# Patient Record
Sex: FEMALE | Race: WHITE | NOT HISPANIC OR LATINO | Employment: FULL TIME | URBAN - NONMETROPOLITAN AREA
[De-identification: names, ages, dates, MRNs, and addresses within clinical notes are randomized per-mention and may not be internally consistent; named-entity substitution may affect disease eponyms.]

---

## 2018-02-05 ENCOUNTER — TELEPHONE (OUTPATIENT)
Dept: FAMILY MEDICINE CLINIC | Facility: CLINIC | Age: 58
End: 2018-02-05

## 2018-02-05 NOTE — TELEPHONE ENCOUNTER
PHONE CALL FROM PATIENT STATING THAT LEFT HAND CONTINUES TO HAVE INCREASED PAIN AND SWELLING AND LEFT RING FINGER SEEMS TO TWIST AND LOCK  PATIENT ALSO NOTICED RECENT RIGHT HAND TENDERNESS AND SWELLING AND POSSIBLE LUMP  PATIENT IS REQUESTING NEW RHEUMATOLOGY REFERRAL AND/OR SOONER APPOINTMENT IN OFFICE

## 2018-02-06 ENCOUNTER — TRANSCRIBE ORDERS (OUTPATIENT)
Dept: FAMILY MEDICINE CLINIC | Facility: CLINIC | Age: 58
End: 2018-02-06

## 2018-02-06 DIAGNOSIS — M79.642 BILATERAL HAND PAIN: Primary | ICD-10-CM

## 2018-02-06 DIAGNOSIS — M25.449 SWELLING OF HAND JOINT, UNSPECIFIED LATERALITY: ICD-10-CM

## 2018-02-06 DIAGNOSIS — M79.641 BILATERAL HAND PAIN: Primary | ICD-10-CM

## 2018-02-06 NOTE — TELEPHONE ENCOUNTER
Please refer patient to Rheumatology for evaluation and treatment of B/L Hand Pain and Swelling  Schedule patient for appointment with our office

## 2018-02-19 ENCOUNTER — TELEPHONE (OUTPATIENT)
Dept: FAMILY MEDICINE CLINIC | Facility: CLINIC | Age: 58
End: 2018-02-19

## 2018-04-05 LAB — HBA1C MFR BLD HPLC: 5.9 %

## 2018-04-17 RX ORDER — LISINOPRIL 5 MG/1
1 TABLET ORAL DAILY
COMMUNITY
Start: 2018-03-02 | End: 2018-04-18 | Stop reason: SDUPTHER

## 2018-04-17 RX ORDER — LEVOTHYROXINE SODIUM 0.07 MG/1
1 TABLET ORAL DAILY
COMMUNITY
Start: 2018-03-02 | End: 2018-04-18 | Stop reason: SDUPTHER

## 2018-04-17 RX ORDER — METOPROLOL SUCCINATE 50 MG/1
1 TABLET, EXTENDED RELEASE ORAL DAILY
COMMUNITY
Start: 2018-03-14 | End: 2018-04-18 | Stop reason: SDUPTHER

## 2018-04-18 ENCOUNTER — OFFICE VISIT (OUTPATIENT)
Dept: FAMILY MEDICINE CLINIC | Facility: CLINIC | Age: 58
End: 2018-04-18
Payer: COMMERCIAL

## 2018-04-18 VITALS
RESPIRATION RATE: 18 BRPM | TEMPERATURE: 98.2 F | HEART RATE: 64 BPM | SYSTOLIC BLOOD PRESSURE: 110 MMHG | OXYGEN SATURATION: 98 % | BODY MASS INDEX: 36.57 KG/M2 | HEIGHT: 63 IN | WEIGHT: 206.4 LBS | DIASTOLIC BLOOD PRESSURE: 64 MMHG

## 2018-04-18 DIAGNOSIS — M25.50 POLYARTHRALGIA: ICD-10-CM

## 2018-04-18 DIAGNOSIS — E55.9 VITAMIN D DEFICIENCY: ICD-10-CM

## 2018-04-18 DIAGNOSIS — R73.9 HYPERGLYCEMIA: ICD-10-CM

## 2018-04-18 DIAGNOSIS — E78.2 MIXED HYPERLIPIDEMIA: ICD-10-CM

## 2018-04-18 DIAGNOSIS — M25.449 SWELLING OF FINGER JOINT, UNSPECIFIED LATERALITY: ICD-10-CM

## 2018-04-18 DIAGNOSIS — J30.89 SEASONAL ALLERGIC RHINITIS DUE TO OTHER ALLERGIC TRIGGER: ICD-10-CM

## 2018-04-18 DIAGNOSIS — M79.641 PAIN IN BOTH HANDS: ICD-10-CM

## 2018-04-18 DIAGNOSIS — D50.8 OTHER IRON DEFICIENCY ANEMIA: ICD-10-CM

## 2018-04-18 DIAGNOSIS — E03.9 ACQUIRED HYPOTHYROIDISM: ICD-10-CM

## 2018-04-18 DIAGNOSIS — I10 ESSENTIAL HYPERTENSION: Primary | ICD-10-CM

## 2018-04-18 DIAGNOSIS — M79.642 PAIN IN BOTH HANDS: ICD-10-CM

## 2018-04-18 DIAGNOSIS — E53.8 VITAMIN B 12 DEFICIENCY: ICD-10-CM

## 2018-04-18 DIAGNOSIS — E88.81 INSULIN RESISTANCE: ICD-10-CM

## 2018-04-18 DIAGNOSIS — R79.89 ELEVATED TSH: ICD-10-CM

## 2018-04-18 DIAGNOSIS — M35.3 POLYMYALGIA (HCC): ICD-10-CM

## 2018-04-18 DIAGNOSIS — Z13.29 SCREENING FOR HYPOTHYROIDISM: ICD-10-CM

## 2018-04-18 PROBLEM — J30.9 ALLERGIC RHINITIS DUE TO ALLERGEN: Status: ACTIVE | Noted: 2018-04-18

## 2018-04-18 PROBLEM — E88.819 INSULIN RESISTANCE: Status: ACTIVE | Noted: 2018-04-18

## 2018-04-18 PROCEDURE — 3008F BODY MASS INDEX DOCD: CPT | Performed by: NURSE PRACTITIONER

## 2018-04-18 PROCEDURE — 99203 OFFICE O/P NEW LOW 30 MIN: CPT | Performed by: NURSE PRACTITIONER

## 2018-04-18 RX ORDER — CHOLECALCIFEROL (VITAMIN D3) 1250 MCG
1 CAPSULE ORAL WEEKLY
COMMUNITY
End: 2018-04-18 | Stop reason: SDUPTHER

## 2018-04-18 RX ORDER — FEXOFENADINE HYDROCHLORIDE 60 MG/1
60 TABLET, FILM COATED ORAL DAILY PRN
Qty: 90 TABLET | Refills: 1 | Status: SHIPPED | OUTPATIENT
Start: 2018-04-18 | End: 2018-10-18 | Stop reason: SDUPTHER

## 2018-04-18 RX ORDER — METOPROLOL SUCCINATE 50 MG/1
50 TABLET, EXTENDED RELEASE ORAL DAILY
Qty: 90 TABLET | Refills: 1 | Status: SHIPPED | OUTPATIENT
Start: 2018-04-18 | End: 2018-10-18 | Stop reason: SDUPTHER

## 2018-04-18 RX ORDER — CHOLECALCIFEROL (VITAMIN D3) 1250 MCG
1 CAPSULE ORAL WEEKLY
Qty: 12 CAPSULE | Refills: 1 | Status: SHIPPED | OUTPATIENT
Start: 2018-04-18 | End: 2018-10-18 | Stop reason: SDUPTHER

## 2018-04-18 RX ORDER — IBUPROFEN 800 MG/1
800 TABLET ORAL DAILY PRN
Qty: 90 TABLET | Refills: 1 | Status: SHIPPED | OUTPATIENT
Start: 2018-04-18 | End: 2018-10-18 | Stop reason: ALTCHOICE

## 2018-04-18 RX ORDER — FEXOFENADINE HYDROCHLORIDE 60 MG/1
60 TABLET, FILM COATED ORAL DAILY PRN
COMMUNITY
End: 2018-04-18 | Stop reason: SDUPTHER

## 2018-04-18 RX ORDER — IBUPROFEN 800 MG/1
800 TABLET ORAL DAILY PRN
COMMUNITY
End: 2018-04-18 | Stop reason: SDUPTHER

## 2018-04-18 RX ORDER — MULTIVIT WITH MINERALS/LUTEIN
1000 TABLET ORAL DAILY
COMMUNITY
End: 2019-10-31 | Stop reason: SDUPTHER

## 2018-04-18 RX ORDER — LISINOPRIL 5 MG/1
5 TABLET ORAL DAILY
Qty: 90 TABLET | Refills: 1 | Status: SHIPPED | OUTPATIENT
Start: 2018-04-18 | End: 2018-10-18 | Stop reason: SDUPTHER

## 2018-04-18 RX ORDER — THIAMINE HCL 100 MG
1 TABLET ORAL EVERY OTHER DAY
COMMUNITY
End: 2019-10-31 | Stop reason: SDUPTHER

## 2018-04-18 RX ORDER — LEVOTHYROXINE SODIUM 0.07 MG/1
75 TABLET ORAL DAILY
Qty: 90 TABLET | Refills: 1 | Status: SHIPPED | OUTPATIENT
Start: 2018-04-18 | End: 2018-10-18 | Stop reason: SDUPTHER

## 2018-04-18 NOTE — PATIENT INSTRUCTIONS
Metabolic Syndrome X   WHAT YOU NEED TO KNOW:   What is metabolic syndrome? Metabolic syndrome is a group of medical conditions that can increase your risk for heart disease, stroke, or type 2 diabetes  You may have metabolic syndrome if you have at least 3 of the following medical conditions:  · High triglycerides (a type of fat in your blood)    · Low HDL cholesterol (good cholesterol)    · High blood pressure    · High blood sugar levels    · Extra abdominal fat  What increases my risk for metabolic syndrome? The exact cause of metabolic syndrome is not known  Your risk for metabolic syndrome increases if you have insulin resistance  Insulin is a hormone that helps your body take sugar out of your blood and use it for energy  Insulin resistance means your pancreas keeps making insulin but your body cannot use it correctly  Your risk for metabolic syndrome also increases as you age, if you are overweight or obese, or you do not exercise  What are the signs and symptoms of metabolic syndrome? Most people with metabolic syndrome do not have any signs or symptoms  You may have more thirst or hunger than usual, urinate more often, or have blurred vision or headaches  How is metabolic syndrome diagnosed? Your healthcare provider will examine you and ask about other medical conditions you may have  He may ask if you have any family members with metabolic syndrome, diabetes, obesity, or heart disease  · Blood tests: These are used to check your glucose and cholesterol levels  · Oral glucose tolerance test:  Your blood sugar level is tested after you fast for 8 hours, then again after you are given a glucose drink  The test measures how high your blood sugar level rises from the glucose drink  How is metabolic syndrome treated? · Cholesterol medicine: This type of medicine is given to help decrease (lower) the amount of cholesterol (fat) in your blood   Cholesterol medicine works best if you also exercise and eat a healthy diet that is low in certain kinds of fats  Some cholesterol medicines may cause liver problems  You may need to have blood taken for tests while using this medicine  · Blood pressure medicine: This is given to lower your blood pressure  A controlled blood pressure helps protect your organs, such as your heart, lungs, brain, and kidneys  Take your blood pressure medicine exactly as directed  · Hypoglycemic medicine: This medicine may be given to decrease the amount of sugar in your blood  Hypoglycemic medicine helps your body move the sugar to your cells, where it is needed for energy  What are the risks of metabolic syndrome? If untreated, metabolic syndrome increases your risk of heart disease, stroke, and diabetes  These conditions lead to life-threatening illness  How can I manage metabolic syndrome? · Maintain a healthy weight:  Weight loss helps lower cholesterol, triglycerides, blood pressure, and blood glucose levels  It can also raise HDL (good cholesterol)  Ask your healthcare provider how much you should weigh  Ask him to help you create a weight loss plan if you are overweight  · Eat a variety of healthy foods:  Healthy foods include fruits, vegetables, whole-grain breads, low-fat dairy products, beans, lean meats, and fish  Eat foods that are low in fat and sodium (salt)  A dietitian can help you plan healthy meals  · Exercise:  Ask your healthcare provider to help you create an exercise plan  Exercise can help lower your blood pressure, cholesterol, and blood sugar levels  Exercise can also help raise your HDL level and help you to lose weight  Get at least 30 minutes of exercise, 5 days each week  · Check your blood pressure as directed: You may be asked to keep a record of your blood pressure and bring it with you to follow-up visits  Ask your healthcare provider what your blood pressure should be and how to check it       · Limit alcohol:  Women should limit alcohol to 1 drink a day  Men should limit alcohol to 2 drinks a day  A drink of alcohol is 12 ounces of beer, 5 ounces of wine, or 1½ ounces of liquor  · Do not smoke: If you smoke, it is never too late to quit  Smoking further increases your risk for heart disease and stroke  Ask your healthcare provider for information if you need help quitting  When should I contact my healthcare provider? · You have more thirst or hunger than usual      · You urinate more frequently  · You have blurred vision  · You have questions or concerns about your condition or care  When should I seek immediate care or call 911? · Your blood pressure is higher than your healthcare provider told you it should be  · You have chest pain or discomfort that spreads to your arms, jaw, or back  · You have a severe headache or dizziness  · You have trouble thinking, speaking, or understanding others  CARE AGREEMENT:   You have the right to help plan your care  Learn about your health condition and how it may be treated  Discuss treatment options with your caregivers to decide what care you want to receive  You always have the right to refuse treatment  The above information is an  only  It is not intended as medical advice for individual conditions or treatments  Talk to your doctor, nurse or pharmacist before following any medical regimen to see if it is safe and effective for you  © 2017 [employer] Information is for End User's use only and may not be sold, redistributed or otherwise used for commercial purposes  All illustrations and images included in CareNotes® are the copyrighted property of [employer]  or Baldev Newman  Instructed patient to take OTC Benefiber as directed  Wellness Visit for Adults   AMBULATORY CARE:   A wellness visit  is when you see your healthcare provider to get screened for health problems   You can also get advice on how to stay healthy  Write down your questions so you remember to ask them  Ask your healthcare provider how often you should have a wellness visit  What happens at a wellness visit:  Your healthcare provider will ask about your health, and your family history of health problems  This includes high blood pressure, heart disease, and cancer  He or she will ask if you have symptoms that concern you, if you smoke, and about your mood  You may also be asked about your intake of medicines, supplements, food, and alcohol  Any of the following may be done:  · Your weight  will be checked  Your height may also be checked so your body mass index (BMI) can be calculated  Your BMI shows if you are at a healthy weight  · Your blood pressure  and heart rate will be checked  Your temperature may also be checked  · Blood and urine tests  may be done  Blood tests may be done to check your cholesterol levels  Abnormal cholesterol levels increase your risk for heart disease and stroke  You may also need a blood or urine test to check for diabetes if you are at increased risk  Urine tests may be done to look for signs of an infection or kidney disease  · A physical exam  includes checking your heartbeat and lungs with a stethoscope  Your healthcare provider may also check your skin to look for sun damage  · Screening tests  may be recommended  A screening test is done to check for diseases that may not cause symptoms  The screening tests you may need depend on your age, gender, family history, and lifestyle habits  For example, colorectal screening may be recommended if you are 48years old or older  Screening tests you need if you are a woman:   · A Pap smear  is used to screen for cervical cancer  Pap smears are usually done every 3 to 5 years depending on your age  You may need them more often if you have had abnormal Pap smear test results in the past  Ask your healthcare provider how often you should have a Pap smear  · A mammogram  is an x-ray of your breasts to screen for breast cancer  Experts recommend mammograms every 2 years starting at age 48 years  You may need a mammogram at age 52 years or younger if you have an increased risk for breast cancer  Talk to your healthcare provider about when you should start having mammograms and how often you need them  Vaccines you may need:   · Get an influenza vaccine  every year  The influenza vaccine protects you from the flu  Several types of viruses cause the flu  The viruses change over time, so new vaccines are made each year  · Get a tetanus-diphtheria (Td) booster vaccine  every 10 years  This vaccine protects you against tetanus and diphtheria  Tetanus is a severe infection that may cause painful muscle spasms and lockjaw  Diphtheria is a severe bacterial infection that causes a thick covering in the back of your mouth and throat  · Get a human papillomavirus (HPV) vaccine  if you are female and aged 23 to 32 or male 23 to 24 and never received it  This vaccine protects you from HPV infection  HPV is the most common infection spread by sexual contact  HPV may also cause vaginal, penile, and anal cancers  · Get a pneumococcal vaccine  if you are aged 72 years or older  The pneumococcal vaccine is an injection given to protect you from pneumococcal disease  Pneumococcal disease is an infection caused by pneumococcal bacteria  The infection may cause pneumonia, meningitis, or an ear infection  · Get a shingles vaccine  if you are aged 61 or older, even if you have had shingles before  The shingles vaccine is an injection to protect you from the varicella-zoster virus  This is the same virus that causes chickenpox  Shingles is a painful rash that develops in people who had chickenpox or have been exposed to the virus  How to eat healthy:  My Plate is a model for planning healthy meals  It shows the types and amounts of foods that should go on your plate   Fruits and vegetables make up about half of your plate, and grains and protein make up the other half  A serving of dairy is included on the side of your plate  The amount of calories and serving sizes you need depends on your age, gender, weight, and height  Examples of healthy foods are listed below:  · Eat a variety of vegetables  such as dark green, red, and orange vegetables  You can also include canned vegetables low in sodium (salt) and frozen vegetables without added butter or sauces  · Eat a variety of fresh fruits , canned fruit in 100% juice, frozen fruit, and dried fruit  · Include whole grains  At least half of the grains you eat should be whole grains  Examples include whole-wheat bread, wheat pasta, brown rice, and whole-grain cereals such as oatmeal     · Eat a variety of protein foods such as seafood (fish and shellfish), lean meat, and poultry without skin (turkey and chicken)  Examples of lean meats include pork leg, shoulder, or tenderloin, and beef round, sirloin, tenderloin, and extra lean ground beef  Other protein foods include eggs and egg substitutes, beans, peas, soy products, nuts, and seeds  · Choose low-fat dairy products such as skim or 1% milk or low-fat yogurt, cheese, and cottage cheese  · Limit unhealthy fats  such as butter, hard margarine, and shortening  Exercise:  Exercise at least 30 minutes per day on most days of the week  Some examples of exercise include walking, biking, dancing, and swimming  You can also fit in more physical activity by taking the stairs instead of the elevator or parking farther away from stores  Include muscle strengthening activities 2 days each week  Regular exercise provides many health benefits  It helps you manage your weight, and decreases your risk for type 2 diabetes, heart disease, stroke, and high blood pressure  Exercise can also help improve your mood  Ask your healthcare provider about the best exercise plan for you  General health and safety guidelines:   · Do not smoke  Nicotine and other chemicals in cigarettes and cigars can cause lung damage  Ask your healthcare provider for information if you currently smoke and need help to quit  E-cigarettes or smokeless tobacco still contain nicotine  Talk to your healthcare provider before you use these products  · Limit alcohol  A drink of alcohol is 12 ounces of beer, 5 ounces of wine, or 1½ ounces of liquor  · Lose weight, if needed  Being overweight increases your risk of certain health conditions  These include heart disease, high blood pressure, type 2 diabetes, and certain types of cancer  · Protect your skin  Do not sunbathe or use tanning beds  Use sunscreen with a SPF 15 or higher  Apply sunscreen at least 15 minutes before you go outside  Reapply sunscreen every 2 hours  Wear protective clothing, hats, and sunglasses when you are outside  · Drive safely  Always wear your seatbelt  Make sure everyone in your car wears a seatbelt  A seatbelt can save your life if you are in an accident  Do not use your cell phone when you are driving  This could distract you and cause an accident  Pull over if you need to make a call or send a text message  · Practice safe sex  Use latex condoms if are sexually active and have more than one partner  Your healthcare provider may recommend screening tests for sexually transmitted infections (STIs)  · Wear helmets, lifejackets, and protective gear  Always wear a helmet when you ride a bike or motorcycle, go skiing, or play sports that could cause a head injury  Wear protective equipment when you play sports  Wear a lifejacket when you are on a boat or doing water sports  © 2017 [facility] Information is for End User's use only and may not be sold, redistributed or otherwise used for commercial purposes   All illustrations and images included in CareNotes® are the copyrighted property of [employer]ONJA [employer]iarra  or Baldev Newman  The above information is an  only  It is not intended as medical advice for individual conditions or treatments  Talk to your doctor, nurse or pharmacist before following any medical regimen to see if it is safe and effective for you

## 2018-04-18 NOTE — PROGRESS NOTES
Assessment/Plan:      Diagnoses and all orders for this visit:    Essential hypertension  -     metoprolol succinate (TOPROL-XL) 50 mg 24 hr tablet; Take 1 tablet (50 mg total) by mouth daily  -     lisinopril (ZESTRIL) 5 mg tablet; Take 1 tablet (5 mg total) by mouth daily    Other iron deficiency anemia  -     CBC and differential; Future    Hyperglycemia  -     Comprehensive metabolic panel; Future  -     HEMOGLOBIN A1C W/ EAG ESTIMATION; Future  -     Insulin, fasting; Future    Screening for hypothyroidism  -     TSH, 3rd generation with T4 reflex; Future    Mixed hyperlipidemia  -     Lipid panel; Future    Vitamin D deficiency  -     Vitamin D 25 hydroxy; Future  -     Cholecalciferol (VITAMIN D3) 79870 units CAPS; Take 1 capsule (50,000 Units total) by mouth once a week    Vitamin B 12 deficiency  -     Vitamin B12; Future    Pain in both hands  -     XR hand 3+ vw right; Future  -     XR hand 3+ vw left; Future    Polyarthralgia  -     XR hand 3+ vw right; Future  -     XR hand 3+ vw left; Future  -     ibuprofen (MOTRIN) 800 mg tablet; Take 1 tablet (800 mg total) by mouth daily as needed for mild pain    Polymyalgia (HCC)  -     XR hand 3+ vw right; Future  -     XR hand 3+ vw left; Future  -     ibuprofen (MOTRIN) 800 mg tablet; Take 1 tablet (800 mg total) by mouth daily as needed for mild pain    Elevated TSH  -     TSH Stimulation; Future    Swelling of finger joint, unspecified laterality  -     XR hand 3+ vw right; Future  -     XR hand 3+ vw left; Future    Acquired hypothyroidism  -     levothyroxine 75 mcg tablet; Take 1 tablet (75 mcg total) by mouth daily    Insulin resistance    Seasonal allergic rhinitis due to other allergic trigger  -     fexofenadine (ALLEGRA) 60 MG tablet; Take 1 tablet (60 mg total) by mouth daily as needed (allergies)    Other orders  -     Discontinue: levothyroxine 75 mcg tablet; Take 1 tablet by mouth daily  -     Discontinue: lisinopril (ZESTRIL) 5 mg tablet;  Take 1 tablet by mouth daily  -     Discontinue: metoprolol succinate (TOPROL-XL) 50 mg 24 hr tablet; Take 1 tablet by mouth daily  -     Discontinue: fexofenadine (ALLEGRA) 60 MG tablet; Take 60 mg by mouth daily as needed  -     Discontinue: ibuprofen (MOTRIN) 800 mg tablet; Take 800 mg by mouth daily as needed for mild pain  -     Cyanocobalamin (VITAMIN B-12) 2500 MCG SUBL; Place 1 tablet under the tongue every other day  -     Ascorbic Acid (VITAMIN C) 1000 MG tablet; Take 1,000 mg by mouth daily  -     Discontinue: Cholecalciferol (VITAMIN D3) 86809 units CAPS; Take 1 capsule by mouth once a week          Subjective:     Patient ID: Tomas Painting is a 65 y o  female  Patient presents to office for initial physical exam to establish care at [address] [place] [address]  Complete medical history and medications reviewed with patient and tolerating all medications well without any difficulty  Patient is scheduled for May 5th, 2018 with [facility] to discuss polyarthralgias, polymyalgias, hives of B/L legs  occurring only night, nodules of B/L palms, and swelling of B/L hands  Patient denies any other problems or concerns at the present time  Review of Systems    GENERAL:  Feels well, denies any significant changes in weight without trying  SKIN:  C/O psoriasis rash of right foot in addition to left ear  C/O eczema of B/L shins  No lesions, opened areas, wounds, change in moles or any other skin changes  C/O intermittent hives of B/L thighs occurring at night time only  HEENT:  Denies any head injury or headaches  Negative blurred vision, floaters, spots before eyes, infections, or other vision problems  Negative significant changes in vision or hearing  Negative tinnitus, vertigo, or infections  Negative hay fever, sinus trouble, nasal discharge, bloody noses, or problems with smell  Negative sore throat, bleeding gums, ulcers, or sores     Glasses/Contacts: Glasses  Hearing Aids: NO  Dentures/Partials/Implants: NO   NECK:  Denies lumps, goiter, pain, swollen glands, or lymphadenopathy  BREASTS:  Denies lumps, pain, nipple discharge, swelling, redness, or any other changes  RESPIRATORY:  Denies cough, wheezing, shortness of breath, dyspnea, or orthopnea  CARDIOVASCULAR:  Denies chest pain or palpitations  GASTROINTESTINAL:  Appetite good, denies nausea, vomiting, or indigestion  Bowel movements normal occurring about once daily or every other day  URINARY:  Denies frequency, urgency, incontinence, dysuria, hematuria, nocturia, or recent flank pain  GENITAL:  Denies vaginal discharge, pelvic infection, lesions, ulcers, or pain  Negative dyspareunia or abnormal vaginal bleeding  PERIPHERAL VASCULAR:  Denies varicosities, swelling, skin changes, or pain  MUSCULOSKELETAL:  C/O chronic low back pain, C/O generalized joint and muscle pain due to Hx Fibromyalgia, C/O painful nodules of B/L hands and does have intermittent swelling in B/L hands  Negative problems with mobility or movement  PSYCHIATRIC:  Denies problems with depression, anxiety, anger, or other psychiatric symptoms  NEUROLOGIC:  Denies fainting, dizziness, memory problems, seizures, tingling, motor or sensory loss  HEMATOLOGIC:  Denies easy bruising, bleeding, or anemia  ENDOCRINE:  Denies thyroid problems, temperature intolerance, excessive sweating, or other endocrine symptoms  Objective:     Physical Exam   Nursing note and vitals reviewed  GENERAL:  Appears well nourished, well groomed, in no acute distress  SKIN:  Palms warm, dry, color good  Nails without clubbing or cyanosis  No lesions, ulcerations, or wounds  HEAD:  Hair is average texture  Scalp without lesions, normocephalic, and atraumatic  EYES:  Visual fields full by confrontation  Conjunctiva pink, sclera white, PERRLA  Extraocular movements intact  Disc margins sharp, without hemorrhages or exudate    No arteriolar narrowing or A-V nicking  EARS:  B/L ear canals clear  B/L TMs clear with + light reflex  Acuity good to whispered voice  Morgan midline  AC>BC  NOSE: Mucosa pink, moist, septum midline  Negative sinus tenderness  B/L turbinates pink, moist, non-edematous without exudate  MOUTH:  Oral mucosa pink  Pharynx pink, moist, without swelling, redness, or exudate  Dentition ok  Tonsils without enlargement or exudate  Tongue midline  NECK:  Supple, trachea midline, Negative thyromegaly, lymphadenopathy, or swollen glands  LYMPH NODES:  Negative enlargement of neck, axillary, epitrochlear, or inguinal nodes  THORAX/LUNGS  Thorax symmetric with good excursion  Lungs resonant  Breath sounds vesicular with no added sounds  Diaphragm descends within normal limits  CARDIOVASCULAR:  Carotid upstrokes brisk and without bruits  Apical impulse discrete and tapping, barely palpable in the 5th ICS/MCL  Normal S1 and Normal S2, Negative S3 or S4  Negative murmurs, thrills, lifts, or heaves  ABDOMEN:  Protuberant, bowel sounds normal active x 4 quadrants  Negative tenderness  Negative masses  Negative hepatomegaly  Negative splenomegaly  Negative costovertebral tenderness  EXTREMITIES:  Warm, calves supple, non-tender, negative for edema  Negative stasis pigmentation or ulcers  +2 pulses throughout  MUSCULOSKELETAL:  Negative joint deformities  Good range of motion in hands, wrists, elbows, shoulders, spine, hips, knees, and ankles  Negative spinal curvature  NEUROLOGICAL:  Mental status:  Awake, alert, and oriented to person, place, time, and event  Normal thought processes  Cranial Nerves:  II-XII intact  Motor:  Good muscle bulk and tone  Strength: 5/5 throughout  Cerebellar:  Rapid alternating movements, point-to-point movements intact  Gait stable and fluid  Sensory:  Pinprick, light touch, position sense, vibration, and stereogenesis intact      Romberg: Negative  Reflexes: +2 throughout

## 2018-04-19 ENCOUNTER — TRANSCRIBE ORDERS (OUTPATIENT)
Dept: LAB | Facility: MEDICAL CENTER | Age: 58
End: 2018-04-19

## 2018-04-19 ENCOUNTER — APPOINTMENT (OUTPATIENT)
Dept: RADIOLOGY | Facility: MEDICAL CENTER | Age: 58
End: 2018-04-19
Payer: COMMERCIAL

## 2018-04-19 ENCOUNTER — TRANSCRIBE ORDERS (OUTPATIENT)
Dept: RADIOLOGY | Facility: MEDICAL CENTER | Age: 58
End: 2018-04-19

## 2018-04-19 DIAGNOSIS — M79.641 PAIN IN BOTH HANDS: ICD-10-CM

## 2018-04-19 DIAGNOSIS — M79.642 PAIN IN BOTH HANDS: ICD-10-CM

## 2018-04-19 DIAGNOSIS — M25.50 POLYARTHRALGIA: ICD-10-CM

## 2018-04-19 DIAGNOSIS — M25.449 SWELLING OF FINGER JOINT, UNSPECIFIED LATERALITY: ICD-10-CM

## 2018-04-19 DIAGNOSIS — M35.3 POLYMYALGIA (HCC): ICD-10-CM

## 2018-04-19 PROCEDURE — 73130 X-RAY EXAM OF HAND: CPT

## 2018-10-18 ENCOUNTER — OFFICE VISIT (OUTPATIENT)
Dept: FAMILY MEDICINE CLINIC | Facility: CLINIC | Age: 58
End: 2018-10-18
Payer: COMMERCIAL

## 2018-10-18 VITALS
WEIGHT: 204.6 LBS | OXYGEN SATURATION: 98 % | HEART RATE: 62 BPM | DIASTOLIC BLOOD PRESSURE: 60 MMHG | BODY MASS INDEX: 36.25 KG/M2 | HEIGHT: 63 IN | SYSTOLIC BLOOD PRESSURE: 112 MMHG | TEMPERATURE: 98.5 F | RESPIRATION RATE: 18 BRPM

## 2018-10-18 DIAGNOSIS — Z13.0 SCREENING FOR DEFICIENCY ANEMIA: ICD-10-CM

## 2018-10-18 DIAGNOSIS — I10 ESSENTIAL HYPERTENSION: ICD-10-CM

## 2018-10-18 DIAGNOSIS — M35.3 POLYMYALGIA (HCC): ICD-10-CM

## 2018-10-18 DIAGNOSIS — J30.89 SEASONAL ALLERGIC RHINITIS DUE TO OTHER ALLERGIC TRIGGER: ICD-10-CM

## 2018-10-18 DIAGNOSIS — E78.49 OTHER HYPERLIPIDEMIA: ICD-10-CM

## 2018-10-18 DIAGNOSIS — E53.8 VITAMIN B12 DEFICIENCY: ICD-10-CM

## 2018-10-18 DIAGNOSIS — E03.9 ACQUIRED HYPOTHYROIDISM: ICD-10-CM

## 2018-10-18 DIAGNOSIS — M25.50 POLYARTHRALGIA: ICD-10-CM

## 2018-10-18 DIAGNOSIS — E55.9 VITAMIN D DEFICIENCY: ICD-10-CM

## 2018-10-18 DIAGNOSIS — E88.81 INSULIN RESISTANCE: ICD-10-CM

## 2018-10-18 DIAGNOSIS — E83.52 HYPERCALCEMIA: ICD-10-CM

## 2018-10-18 DIAGNOSIS — Z13.29 SCREENING FOR HYPOTHYROIDISM: ICD-10-CM

## 2018-10-18 DIAGNOSIS — R73.9 HYPERGLYCEMIA: ICD-10-CM

## 2018-10-18 DIAGNOSIS — M79.642 PAIN IN BOTH HANDS: ICD-10-CM

## 2018-10-18 DIAGNOSIS — M79.641 PAIN IN BOTH HANDS: ICD-10-CM

## 2018-10-18 DIAGNOSIS — L40.50 PSORIATIC ARTHRITIS (HCC): Primary | ICD-10-CM

## 2018-10-18 PROCEDURE — 99214 OFFICE O/P EST MOD 30 MIN: CPT | Performed by: NURSE PRACTITIONER

## 2018-10-18 RX ORDER — FEXOFENADINE HYDROCHLORIDE 60 MG/1
60 TABLET, FILM COATED ORAL DAILY PRN
Qty: 90 TABLET | Refills: 1 | Status: SHIPPED | OUTPATIENT
Start: 2018-10-18 | End: 2019-10-31 | Stop reason: SDUPTHER

## 2018-10-18 RX ORDER — CHOLECALCIFEROL (VITAMIN D3) 1250 MCG
1 CAPSULE ORAL
Qty: 3 CAPSULE | Refills: 1 | Status: SHIPPED | OUTPATIENT
Start: 2018-10-18 | End: 2019-04-18 | Stop reason: SDUPTHER

## 2018-10-18 RX ORDER — LISINOPRIL 5 MG/1
5 TABLET ORAL DAILY
Qty: 90 TABLET | Refills: 1 | Status: SHIPPED | OUTPATIENT
Start: 2018-10-18 | End: 2019-04-18 | Stop reason: SDUPTHER

## 2018-10-18 RX ORDER — MELOXICAM 15 MG/1
1 TABLET ORAL DAILY
COMMUNITY
Start: 2018-10-02 | End: 2019-04-18 | Stop reason: SDUPTHER

## 2018-10-18 RX ORDER — LEVOTHYROXINE SODIUM 0.07 MG/1
75 TABLET ORAL DAILY
Qty: 90 TABLET | Refills: 1 | Status: SHIPPED | OUTPATIENT
Start: 2018-10-18 | End: 2019-04-18 | Stop reason: SDUPTHER

## 2018-10-18 RX ORDER — METOPROLOL SUCCINATE 50 MG/1
50 TABLET, EXTENDED RELEASE ORAL DAILY
Qty: 90 TABLET | Refills: 1 | Status: SHIPPED | OUTPATIENT
Start: 2018-10-18 | End: 2019-04-18 | Stop reason: SDUPTHER

## 2018-10-18 RX ORDER — LANOLIN ALCOHOL/MO/W.PET/CERES
1 CREAM (GRAM) TOPICAL 3 TIMES DAILY
Qty: 60 TABLET | Refills: 0 | Status: SHIPPED | OUTPATIENT
Start: 2018-10-18 | End: 2019-10-31 | Stop reason: SDUPTHER

## 2018-10-18 NOTE — PROGRESS NOTES
Assessment/Plan:      Diagnoses and all orders for this visit:    Psoriatic arthritis ([address] )  -     glucosamine-chondroitin 500-400 MG tablet; Take 1 tablet by mouth 3 (three) times a day    Screening for deficiency anemia  -     CBC and differential; Future    Vitamin B12 deficiency  -     Vitamin B12; Future    Vitamin D deficiency  -     Vitamin D 25 hydroxy; Future  -     Cholecalciferol (VITAMIN D3) 24626 units CAPS; Take 1 capsule (50,000 Units total) by mouth every 30 (thirty) days    Hyperglycemia  -     Comprehensive metabolic panel; Future  -     Hemoglobin A1C; Future  -     Insulin, fasting; Future    Other hyperlipidemia  -     Lipid panel; Future    Screening for hypothyroidism  -     TSH, 3rd generation; Future    Hypercalcemia  -     Calcium; Future    Essential hypertension  -     lisinopril (ZESTRIL) 5 mg tablet; Take 1 tablet (5 mg total) by mouth daily  -     metoprolol succinate (TOPROL-XL) 50 mg 24 hr tablet; Take 1 tablet (50 mg total) by mouth daily    Seasonal allergic rhinitis due to other allergic trigger  -     fexofenadine (ALLEGRA) 60 MG tablet; Take 1 tablet (60 mg total) by mouth daily as needed (allergies)    Acquired hypothyroidism  -     levothyroxine 75 mcg tablet; Take 1 tablet (75 mcg total) by mouth daily    Polymyalgia (HCC)    Insulin resistance    Polyarthralgia    Pain in both hands    Other orders  -     meloxicam (MOBIC) 15 mg tablet; Take 1 tablet by mouth daily          Subjective:     Patient ID: aSrah Bauer is a 63 y o  female  Patient presents to office for follow up and recheck  Patient is currently attending the [facility] and has a  in with her to help loose weight and to help with her joint pain  Patient is currently being followed by [facility] for psoriatic and generalized osteoarthritis causing generalized joint pain who started her on Meloxicam 15 mg daily    Complete medical history and medications reviewed with patient and tolerating all medications well without any problems  Patient will be getting Influenza Vaccine tomorrow by Employer  Review of Systems    GENERAL:  Feels well, denies any significant changes in weight without trying  SKIN:  Hx of psoriasis rash controlled at present time  Hx of  eczema  No lesions, opened areas, wounds, change in moles or any other skin changes  HEENT:  Denies any head injury or headaches  Negative blurred vision, floaters, spots before eyes, infections, or other vision problems  Negative significant changes in vision or hearing  Negative tinnitus, vertigo, or infections  Negative hay fever, sinus trouble, nasal discharge, bloody noses, or problems with smell  Negative sore throat, bleeding gums, ulcers, or sores  Glasses/Contacts: Glasses  Hearing Aids: NO  Dentures/Partials/Implants: NO   NECK:  Denies lumps, goiter, pain, swollen glands, or lymphadenopathy  BREASTS:  Denies lumps, pain, nipple discharge, swelling, redness, or any other changes  RESPIRATORY:  Denies cough, wheezing, shortness of breath, dyspnea, or orthopnea  CARDIOVASCULAR:  Denies chest pain or palpitations  GASTROINTESTINAL:  Appetite good, denies nausea, vomiting, or indigestion  Bowel movements normal occurring about once daily or every other day  URINARY:  Denies frequency, urgency, incontinence, dysuria, hematuria, nocturia, or recent flank pain  GENITAL:  Denies vaginal discharge, pelvic infection, lesions, ulcers, or pain  Negative dyspareunia or abnormal vaginal bleeding  PERIPHERAL VASCULAR:  Denies varicosities, swelling, skin changes, or pain  MUSCULOSKELETAL:  C/O chronic low back pain, C/O generalized joint and muscle pain due to Hx Fibromyalgia, psoriatic arthritis, and osteoarthritis  Continues with nodules of B/L hands and feels Meloxicam is helping the swelling of joints  Negative problems with mobility or movement     PSYCHIATRIC:  Denies problems with depression, anxiety, anger, or other psychiatric symptoms  NEUROLOGIC:  Denies fainting, dizziness, memory problems, seizures, tingling, motor or sensory loss  HEMATOLOGIC:  Denies easy bruising, bleeding, or anemia  ENDOCRINE:  Denies thyroid problems, temperature intolerance, excessive sweating, or other endocrine symptoms  Objective:     Physical Exam   Nursing note and vitals reviewed  GENERAL:  Appears well nourished, well groomed, in no acute distress  SKIN:  Palms warm, dry, color good  Nails without clubbing or cyanosis  No lesions, ulcerations, or wounds  HEAD:  Hair is average texture  Scalp without lesions, normocephalic, and atraumatic  EYES:  Visual fields full by confrontation  Conjunctiva pink, sclera white, PERRLA  Extraocular movements intact  EARS:  B/L ear canals clear  B/L TMs clear with + light reflex  [place] NOSE: Mucosa pink, moist, septum midline  Negative sinus tenderness  B/L turbinates pink, moist, non-edematous without exudate  MOUTH:  Oral mucosa pink  Pharynx pink, moist, without swelling, redness, or exudate  Dentition ok  Tonsils without enlargement or exudate  Tongue midline  NECK:  Supple, trachea midline, Negative thyromegaly, lymphadenopathy, or swollen glands  LYMPH NODES:  Negative enlargement of neck, axillary, epitrochlear, or inguinal nodes  THORAX/LUNGS  Thorax symmetric with good excursion  Lungs resonant  Breath sounds vesicular with no added sounds  Diaphragm descends within normal limits  CARDIOVASCULAR:  Carotid upstrokes brisk and without bruits  Apical impulse discrete and tapping, barely palpable in the 5th ICS/MCL  Normal S1 and Normal S2, Negative S3 or S4  Negative murmurs, thrills, lifts, or heaves  ABDOMEN:  Protuberant, bowel sounds normal active x 4 quadrants  Negative tenderness  Negative masses  Negative hepatomegaly  Negative splenomegaly  Negative costovertebral tenderness    EXTREMITIES:  Warm, calves supple, non-tender, negative for edema  Negative stasis pigmentation or ulcers  +2 pulses throughout  MUSCULOSKELETAL:  Negative joint deformities  Good range of motion in hands, wrists, elbows, shoulders, spine, hips, knees, and ankles  Negative spinal curvature  NEUROLOGICAL:  Mental status:  Awake, alert, and oriented to person, place, time, and event  Normal thought processes  Cranial Nerves:  II-XII intact  Motor:  Good muscle bulk and tone  Strength: 5/5 throughout  Cerebellar:  Rapid alternating movements, point-to-point movements intact  Gait stable and fluid  Sensory:  Pinprick, light touch, position sense, vibration, and stereogenesis intact  Romberg: Negative  Reflexes: +2 throughout

## 2018-10-18 NOTE — PATIENT INSTRUCTIONS
Low-Sodium Diet   WHAT YOU NEED TO KNOW:   What is a low-sodium diet? A low-sodium diet limits foods that are high in sodium (salt)  You will need to follow a low-sodium diet if you have high blood pressure, kidney disease, or heart failure  You may also need to follow this diet if you have a condition that is causing your body to retain (hold) extra fluid  You may need to limit the amount of sodium you eat to 1,500 mg  Ask your healthcare provider how much sodium you can have each day  How can I use food labels to choose foods that are low in sodium? Read food labels to find the amount of sodium they contain  The amount of sodium is listed in milligrams (mg)  The % Daily Value (DV) column tells you how much of your daily needs are met by 1 serving of the food for each nutrient listed  Choose foods that have less than 5% of the DV of sodium  These foods are considered low in sodium  Foods that have 20% or more of the DV of sodium are considered high in sodium  Some food labels may also list any of the following terms that tell you about the sodium content in the food:  · Sodium-free:  Less than 5 mg in each serving    · Very low sodium:  35 mg of sodium or less in each serving    · Low sodium:  140 mg of sodium or less in each serving    · Reduced sodium: At least 25% less sodium in each serving than the regular type    · Light in sodium:  50% less sodium in each serving    · Unsalted or no added salt:  No extra salt is added during processing (the food may still contain sodium)  Which foods should I avoid? Salty foods are high in sodium   You should avoid the following:  · Processed foods:      ¨ Mixes for cornbread, biscuits, cake, and pudding     ¨ Instant foods, such as potatoes, cereals, noodles, and rice     ¨ Packaged foods, such as bread stuffing, rice and pasta mixes, snack dip mixes, and macaroni and cheese     ¨ Canned foods, such as canned vegetables, soups, broths, sauces, and vegetable or tomato juice    ¨ Snack foods, such as salted chips, popcorn, pretzels, pork rinds, salted crackers, and salted nuts    ¨ Frozen foods, such as dinners, entrees, vegetables with sauces, and breaded meats    ¨ Sauerkraut, pickled vegetables, and other foods prepared in brine    · Meats and cheeses:      ¨ Smoked or cured meat, such as corned beef, ching, ham, hot dogs, and sausage    ¨ Canned meats or spreads, such as potted meats, sardines, anchovies, and imitation seafood    ¨ Deli or lunch meats, such as bologna, ham, turkey, and roast beef    ¨ Processed cheese, such as American cheese and cheese spreads    · Condiments, sauces, and seasonings:      ¨ Salt (¼ teaspoon of salt contains 575 mg of sodium)    ¨ Seasonings made with salt, such as garlic salt, celery salt, onion salt, and seasoned salt    ¨ Regular soy sauce, barbecue sauce, teriyaki sauce, steak sauce, Worcestershire sauce, and most flavored vinegars    ¨ Canned gravy and mixes     ¨ Regular condiments, such as mustard, ketchup, and salad dressings    ¨ Pickles and olives    ¨ Meat tenderizers and monosodium glutamate (MSG)  Which foods can I include? Read the food label to find the amount of sodium in each serving  · Bread and cereal:  Try to choose breads with less than 80 mg of sodium per serving  ¨ Bread, roll, carina, tortilla, or unsalted crackers  ¨ Ready-to-eat cereals with less than 5% DV of sodium (examples include shredded wheat and puffed rice)    ¨ Pasta    · Vegetables and fruits:      ¨ Unsalted fresh, frozen, or canned vegetables    ¨ Fresh, frozen, or canned fruits    ¨ Fruit juice    · Dairy:  One serving has about 150 mg of sodium  ¨ Milk, all types    ¨ Yogurt    ¨ Hard cheese, such as cheddar, Swiss, [employer], or mozzarella    · Meat and other protein foods:  Some raw meats may have added sodium       ¨ Plain meats, fish, and poultry     ¨ Egg    · Other foods:      ¨ Homemade pudding    ¨ Unsalted nuts, popcorn, or pretzels    ¨ Unsalted butter or margarine  What are some ways that I can decrease sodium? · Add spices and herbs to foods instead of salt during cooking  Use salt-free seasonings to add flavor to foods  Examples include onion powder, garlic powder, basil, laguerre powder, paprika, and parsley  Try lemon or lime juice or vinegar to give foods a tart flavor  Use hot peppers, pepper, or cayenne pepper to add a spicy flavor to foods  · Do not keep a salt shaker at your kitchen table  This may help keep you from adding salt to food at the table  It may take time to get used to enjoying the natural flavor of food instead of adding salt  Talk to your healthcare provider before you use salt substitutes  Some salt substitutes have a high amount of potassium and need to be avoided if you have kidney disease  · Choose low-sodium foods at restaurants  Meals from restaurants are often high in sodium  Some restaurants have nutrition information on the menu that tells you the amount of sodium in their foods  If possible, ask for your food to be prepared with less, or no salt  · Shop for unsalted or low-sodium foods and snacks at the grocery store  Examples include unsalted or low-sodium broths, soups, and canned vegetables  Choose fresh or frozen vegetables instead  Choose unsalted nuts or seeds or fresh fruits or vegetables as snacks  Read food labels and choose salt-free, very low-sodium, or low-sodium foods  CARE AGREEMENT:   You have the right to help plan your care  Discuss treatment options with your caregivers to decide what care you want to receive  You always have the right to refuse treatment  The above information is an  only  It is not intended as medical advice for individual conditions or treatments  Talk to your doctor, nurse or pharmacist before following any medical regimen to see if it is safe and effective for you    © 2017 [employer] Information is for End User's use only and may not be sold, redistributed or otherwise used for commercial purposes  All illustrations and images included in CareNotes® are the copyrighted property of [employer]  or Baldev Newman  Wellness Visit for Adults   WHAT YOU NEED TO KNOW:   What is a wellness visit? A wellness visit is when you see your healthcare provider to get screened for health problems  You can also get advice on how to stay healthy  Write down your questions so you remember to ask them  Ask your healthcare provider how often you should have a wellness visit  What happens at a wellness visit? Your healthcare provider will ask about your health, and your family history of health problems  This includes high blood pressure, heart disease, and cancer  He or she will ask if you have symptoms that concern you, if you smoke, and about your mood  You may also be asked about your intake of medicines, supplements, food, and alcohol  Any of the following may be done:  · Your weight  will be checked  Your height may also be checked so your body mass index (BMI) can be calculated  Your BMI shows if you are at a healthy weight  · Your blood pressure  and heart rate will be checked  Your temperature may also be checked  · Blood and urine tests  may be done  Blood tests may be done to check your cholesterol levels  Abnormal cholesterol levels increase your risk for heart disease and stroke  You may also need a blood or urine test to check for diabetes if you are at increased risk  Urine tests may be done to look for signs of an infection or kidney disease  · A physical exam  includes checking your heartbeat and lungs with a stethoscope  Your healthcare provider may also check your skin to look for sun damage  · Screening tests  may be recommended  A screening test is done to check for diseases that may not cause symptoms   The screening tests you may need depend on your age, gender, family history, and lifestyle habits  For example, colorectal screening may be recommended if you are 48years old or older  What screening tests do I need if I am a woman? · A Pap smear  is used to screen for cervical cancer  Pap smears are usually done every 3 to 5 years depending on your age  You may need them more often if you have had abnormal Pap smear test results in the past  Ask your healthcare provider how often you should have a Pap smear  · A mammogram  is an x-ray of your breasts to screen for breast cancer  Experts recommend mammograms every 2 years starting at age 48 years  You may need a mammogram at age 52 years or younger if you have an increased risk for breast cancer  Talk to your healthcare provider about when you should start having mammograms and how often you need them  What vaccines might I need? · Get an influenza vaccine  every year  The influenza vaccine protects you from the flu  Several types of viruses cause the flu  The viruses change over time, so new vaccines are made each year  · Get a tetanus-diphtheria (Td) booster vaccine  every 10 years  This vaccine protects you against tetanus and diphtheria  Tetanus is a severe infection that may cause painful muscle spasms and lockjaw  Diphtheria is a severe bacterial infection that causes a thick covering in the back of your mouth and throat  · Get a human papillomavirus (HPV) vaccine  if you are female and aged 23 to 32 or male 23 to 24 and never received it  This vaccine protects you from HPV infection  HPV is the most common infection spread by sexual contact  HPV may also cause vaginal, penile, and anal cancers  · Get a pneumococcal vaccine  if you are aged 72 years or older  The pneumococcal vaccine is an injection given to protect you from pneumococcal disease  Pneumococcal disease is an infection caused by pneumococcal bacteria  The infection may cause pneumonia, meningitis, or an ear infection      · Get a shingles vaccine  if you are aged 61 or older, even if you have had shingles before  The shingles vaccine is an injection to protect you from the varicella-zoster virus  This is the same virus that causes chickenpox  Shingles is a painful rash that develops in people who had chickenpox or have been exposed to the virus  How can I eat healthy? My Plate is a model for planning healthy meals  It shows the types and amounts of foods that should go on your plate  Fruits and vegetables make up about half of your plate, and grains and protein make up the other half  A serving of dairy is included on the side of your plate  The amount of calories and serving sizes you need depends on your age, gender, weight, and height  Examples of healthy foods are listed below:  · Eat a variety of vegetables  such as dark green, red, and orange vegetables  You can also include canned vegetables low in sodium (salt) and frozen vegetables without added butter or sauces  · Eat a variety of fresh fruits , canned fruit in 100% juice, frozen fruit, and dried fruit  · Include whole grains  At least half of the grains you eat should be whole grains  Examples include whole-wheat bread, wheat pasta, brown rice, and whole-grain cereals such as oatmeal     · Eat a variety of protein foods such as seafood (fish and shellfish), lean meat, and poultry without skin (turkey and chicken)  Examples of lean meats include pork leg, shoulder, or tenderloin, and beef round, sirloin, tenderloin, and extra lean ground beef  Other protein foods include eggs and egg substitutes, beans, peas, soy products, nuts, and seeds  · Choose low-fat dairy products such as skim or 1% milk or low-fat yogurt, cheese, and cottage cheese  · Limit unhealthy fats  such as butter, hard margarine, and shortening  How much exercise do I need? Exercise at least 30 minutes per day on most days of the week  Some examples of exercise include walking, biking, dancing, and swimming   You can also fit in more physical activity by taking the stairs instead of the elevator or parking farther away from stores  Include muscle strengthening activities 2 days each week  Regular exercise provides many health benefits  It helps you manage your weight, and decreases your risk for type 2 diabetes, heart disease, stroke, and high blood pressure  Exercise can also help improve your mood  Ask your healthcare provider about the best exercise plan for you  What are some general health and safety guidelines I should follow? · Do not smoke  Nicotine and other chemicals in cigarettes and cigars can cause lung damage  Ask your healthcare provider for information if you currently smoke and need help to quit  E-cigarettes or smokeless tobacco still contain nicotine  Talk to your healthcare provider before you use these products  · Limit alcohol  A drink of alcohol is 12 ounces of beer, 5 ounces of wine, or 1½ ounces of liquor  · Lose weight, if needed  Being overweight increases your risk of certain health conditions  These include heart disease, high blood pressure, type 2 diabetes, and certain types of cancer  · Protect your skin  Do not sunbathe or use tanning beds  Use sunscreen with a SPF 15 or higher  Apply sunscreen at least 15 minutes before you go outside  Reapply sunscreen every 2 hours  Wear protective clothing, hats, and sunglasses when you are outside  · Drive safely  Always wear your seatbelt  Make sure everyone in your car wears a seatbelt  A seatbelt can save your life if you are in an accident  Do not use your cell phone when you are driving  This could distract you and cause an accident  Pull over if you need to make a call or send a text message  · Practice safe sex  Use latex condoms if are sexually active and have more than one partner  Your healthcare provider may recommend screening tests for sexually transmitted infections (STIs)      · Wear helmets, lifejackets, and protective gear   Always wear a helmet when you ride a bike or motorcycle, go skiing, or play sports that could cause a head injury  Wear protective equipment when you play sports  Wear a lifejacket when you are on a boat or doing water sports  CARE AGREEMENT:   You have the right to help plan your care  Learn about your health condition and how it may be treated  Discuss treatment options with your caregivers to decide what care you want to receive  You always have the right to refuse treatment  The above information is an  only  It is not intended as medical advice for individual conditions or treatments  Talk to your doctor, nurse or pharmacist before following any medical regimen to see if it is safe and effective for you  © 2017 [employer]  Information is for End User's use only and may not be sold, redistributed or otherwise used for commercial purposes  All illustrations and images included in CareNotes® are the copyrighted property of [employer]  or Baldev Newman

## 2018-11-23 ENCOUNTER — OFFICE VISIT (OUTPATIENT)
Dept: FAMILY MEDICINE CLINIC | Facility: CLINIC | Age: 58
End: 2018-11-23
Payer: COMMERCIAL

## 2018-11-23 ENCOUNTER — TELEPHONE (OUTPATIENT)
Dept: FAMILY MEDICINE CLINIC | Facility: CLINIC | Age: 58
End: 2018-11-23

## 2018-11-23 VITALS
DIASTOLIC BLOOD PRESSURE: 80 MMHG | HEART RATE: 65 BPM | OXYGEN SATURATION: 97 % | WEIGHT: 187.8 LBS | SYSTOLIC BLOOD PRESSURE: 114 MMHG | TEMPERATURE: 98.9 F | BODY MASS INDEX: 33.27 KG/M2 | HEIGHT: 63 IN

## 2018-11-23 DIAGNOSIS — L03.115 CELLULITIS OF RIGHT LOWER EXTREMITY: ICD-10-CM

## 2018-11-23 DIAGNOSIS — L40.9 PSORIASIS: Primary | ICD-10-CM

## 2018-11-23 DIAGNOSIS — L28.2 PRURITIC RASH: ICD-10-CM

## 2018-11-23 PROCEDURE — 99214 OFFICE O/P EST MOD 30 MIN: CPT | Performed by: NURSE PRACTITIONER

## 2018-11-23 PROCEDURE — 3008F BODY MASS INDEX DOCD: CPT | Performed by: NURSE PRACTITIONER

## 2018-11-23 RX ORDER — CEPHALEXIN 500 MG/1
500 CAPSULE ORAL EVERY 6 HOURS SCHEDULED
Qty: 20 CAPSULE | Refills: 0 | Status: SHIPPED | OUTPATIENT
Start: 2018-11-23 | End: 2018-11-30 | Stop reason: ALTCHOICE

## 2018-11-23 RX ORDER — PREDNISONE 10 MG/1
10 TABLET ORAL 2 TIMES DAILY WITH MEALS
Qty: 7 TABLET | Refills: 0 | Status: SHIPPED | OUTPATIENT
Start: 2018-11-23 | End: 2018-11-30 | Stop reason: ALTCHOICE

## 2018-11-23 NOTE — PATIENT INSTRUCTIONS
Psoriasis   WHAT YOU NEED TO KNOW:   What is psoriasis? Psoriasis is a long-term skin disease in which the skin cells grow faster than normal  This abnormal growth causes a buildup of cells on the surface of the skin  Red, raised patches that are covered with silver-colored scales form on your skin  What causes psoriasis? The exact cause of psoriasis is not known  A problem with the immune system sometimes causes your body to attack healthy skin cells  Psoriasis is more likely to occur if another family member also has psoriasis  Flare-ups of psoriasis come and go and are often caused by certain triggers  · Infections:  Germs, such as bacteria, viruses, or fungi, may trigger a flare-up  A flare-up of psoriasis usually follows a sore throat  · Medicines:  Certain medicines, such as those used to treat high blood pressure or depression, may trigger a psoriasis flare-up  · Skin damage:  Skin injuries, such as a cut or scrape, or a sunburn may increase your risk of a flare-up  · Smoking and alcohol:  Smoking and drinking alcohol may also increase your risk  · Stress: Both physical and emotional stress may lead to a flare-up of psoriasis  What are the signs and symptoms of psoriasis? The signs and symptoms usually depend on the type of psoriasis you have  · Plaque type: This is the most common and mildest type of psoriasis  Plaques are reddened patches covered with silver-colored scales  Your knees, elbows, scalp, stomach, and back are usually affected  You may also have nail changes, such as pitting, thickening, or lifting of the nails off the nail bed  · Guttate type: This type is the most common among children and young adults  It usually happens after a sore throat or other infections  This type looks like red, raised, pea-sized drops on your skin  · Inverse type: The plaques appear as smooth red patches and are often found in the moist areas of your body   It affects skin in the armpits, groin, under breasts, and around the genitals  · Erythrodermic type: This is a rare and severe type of psoriasis in which plaques cover large areas of the skin  These areas itch and are painful  · Pustular type:  Pustules (blisters with pus inside) or pimple-like lesions may appear on large red areas of the skin  Sometimes this type is limited to the palms of the hands and soles of the feet  · Psoriatic arthritis:  Some people who have psoriasis may also develop psoriatic arthritis  Psoriatic arthritis makes your joints swollen and painful  You may also have nail changes, such as pitting, thickening, or lifting of the nails off the nail bed  How is psoriasis diagnosed? Your healthcare provider will ask about your health history  He may also want to know if you have other family members who have psoriasis  Psoriasis is usually diagnosed after a careful examination of your skin, scalp, and nails  Blood tests and x-rays may also be needed  How is psoriasis treated? Treatment usually depends on the severity of the disease, size of the areas involved, and the type of psoriasis  · Topical medicine: These medicines are ointments, creams, and pastes that are applied on the skin  ¨ Moisturizers: These soothe your skin by keeping it moist and preventing dryness  ¨ Steroids: This medicine may be given to decrease inflammation  ¨ Vitamin D and retinoids: These are vitamin-based creams that are used to clear plaques  ¨ Anthralin:  This medicine decreases swelling and excess skin cells that form scales  ¨ Salicylic acid: This peeling agent helps decrease scaling of the skin and scalp  ¨ Tar preparations: These medicines decrease itching, scaling, and inflammation  They may be shampoos, creams, or bath oils  · Oral medicine: These medicines are used to treat serious types of psoriasis and are taken by mouth  They include steroids or retinoids   They may also include medicines that decrease the rate of growth of your skin cells or that affect your immune system  · Phototherapy:  You may need ultraviolet (UV) light treatments if your psoriasis is severe  Your skin is exposed to UV light for the period of time that your healthcare provider prescribes  What are the risks of psoriasis? Certain medicines used to treat psoriasis can cause burning, redness, and irritation of your skin  They can also cause drowsiness, high blood pressure, or birth defects  Without treatment, your signs and symptoms may worsen  Psoriasis may cause severe itching, swelling, and infection  You may also bleed more easily  How can I manage my psoriasis? · Take care of your skin:  Apply emollients, lubricants, or moisturizing creams to your skin regularly  Apply these while your skin is still damp when you bathe  Stop using them if they sting or irritate your skin  Use mild soaps and add bath oils to soothe your skin when you bathe  · Protect your skin from sun exposure:  When you get sun exposure for short periods of time, it can help your psoriasis  Too much sun exposure or a sunburn can make your psoriasis worse  Talk to your dermatologist or healthcare provider about how much sun exposure is right for you  · Manage stress:  Stress can trigger a flare-up  Find healthy ways to manage stress, such as deep breathing or meditation  · Watch for symptoms with new medicines or herbal supplements:  Some medicines, including herbal supplements, may trigger a psoriasis flare-up  Ask if any of the medicines you take may be making your psoriasis worse  Always check for skin changes when you take your medicines  · Do not smoke:  Smoking can trigger a flare-up of psoriasis  If you smoke, it is never too late to quit  Ask for information about how to stop  · Avoid triggers:  Injury to the skin, cold weather, and heavy alcohol use are other things that can trigger psoriasis flare-ups    When should I contact my healthcare provider? · You get pregnant  · You have a fever  · Your skin plaques are not getting better or are getting worse  · You cannot sleep because your skin itches  · Your skin plaques have pus draining from them or they have soft yellow scabs  · You have questions or concerns about your condition or care  When should I seek immediate care? · Psoriasis suddenly covers larger areas of your body and becomes more painful  CARE AGREEMENT:   You have the right to help plan your care  Learn about your health condition and how it may be treated  Discuss treatment options with your caregivers to decide what care you want to receive  You always have the right to refuse treatment  The above information is an  only  It is not intended as medical advice for individual conditions or treatments  Talk to your doctor, nurse or pharmacist before following any medical regimen to see if it is safe and effective for you  © 2017 [employer] Information is for End User's use only and may not be sold, redistributed or otherwise used for commercial purposes  All illustrations and images included in CareNotes® are the copyrighted property of [employer]  or [employer]dev [employer]   WHAT YOU NEED TO KNOW:   What is cellulitis? Cellulitis is a skin infection caused by bacteria  Cellulitis usually appears on the legs and feet, arms and hands, or face  What increases my risk for cellulitis? · An injury that breaks the skin, such as a bite, scratch, or cut    · Sores or injuries exposed to hot tub water or water in ponds, streams, or oceans    · Shared belongings, such as towels or exercise equipment    · Drugs that are injected    · A weak immune system or diabetes    · Lymphedema, chronic venous insufficiency, peripheral vascular disease, or deep vein thrombosis  What are the signs and symptoms of cellulitis?    · A red, warm, swollen area on your skin    · Pain when the area is touched    · Bumps or blisters (abscess) that may drain pus    · Bumpy, raised skin that feels like an orange peel  How is cellulitis diagnosed? Your healthcare provider may know you have cellulitis by looking at and feeling your skin  Tell him or her how long you have had symptoms, and if anything helps decrease your symptoms  Tell your healthcare provider if you have ever had a cellulitis infection  He or she may not know which kind of bacteria caused your cellulitis  You may  need any of the following tests:  · Blood tests  may show which kind of bacteria is causing your infection  Blood tests may also show if the infection is in your blood  · A sample of tissue or fluid from your infected skin  may show what is causing your infection  The sample may also show if your infection is caused by another kind of skin disorder  · An x-ray, ultrasound, CT, or MRI  may show if the infection has spread  You may be given contrast liquid to help the infection show up better in the pictures  Tell the healthcare provider if you have ever had an allergic reaction to contrast liquid  Do not enter the MRI room with anything metal  Metal can cause serious injury  Tell the healthcare provider if you have any metal in or on your body  How is cellulitis treated? Treatment may decrease symptoms, stop the infection from spreading, and cure the infection  Treatment depends on how severe your cellulitis is  Cellulitis may go away on its own  You may  instead need antibiotics to help treat the bacterial infection  Your healthcare provider may draw a [place] around the edges of your cellulitis  If your cellulitis spreads, your healthcare provider will see it outside of the [place]  How can I manage my symptoms? · Elevate the area above the level of your heart  as often as you can  This will help decrease swelling and pain  Prop the area on pillows or blankets to keep it elevated comfortably  · Clean the area daily until the wound scabs over  Gently wash the area with soap and water  Pat dry  Use dressings as directed  · Place cool or warm, wet cloths on the area as directed  Use clean cloths and clean water  Leave it on the area until the cloth is room temperature  Pat the area dry with a clean, dry cloth  The cloths may help decrease pain  How can I prevent cellulitis? · Do not scratch bug bites or areas of injury  You increase your risk for cellulitis by scratching these areas  · Do not share personal items, such as towels, clothing, and razors  · Clean exercise equipment  with germ-killing  before and after you use it  · Wash your hands often  Use soap and water  Wash your hands after you use the bathroom, change a child's diapers, or sneeze  Wash your hands before you prepare or eat food  Use lotion to prevent dry, cracked skin  · Wear pressure stockings as directed  You may be told to wear the stockings if you have peripheral edema  Peripheral edema is swelling in your legs  The stockings improve blood flow and decrease swelling  · Treat athlete's foot  This can help prevent the spread of a bacterial skin infection  Call 911 if:   · You have sudden trouble breathing or chest pain  When should I seek immediate care? · Your wound gets larger and more painful  · You feel a crackling under your skin when you touch it  · You have purple dots or bumps on your skin, or you see bleeding under your skin  · You have new swelling and pain in your legs  · The red, warm, swollen area gets larger  · You see red streaks coming from the infected area  When should I contact my healthcare provider? · You have a fever  · Your fever or pain does not go away or gets worse  · The area does not get smaller after 2 days of antibiotics  · You have questions or concerns about your condition or care    CARE AGREEMENT:   You have the right to help plan your care  Learn about your health condition and how it may be treated  Discuss treatment options with your caregivers to decide what care you want to receive  You always have the right to refuse treatment  The above information is an  only  It is not intended as medical advice for individual conditions or treatments  Talk to your doctor, nurse or pharmacist before following any medical regimen to see if it is safe and effective for you  © 2017 [employer]  Information is for End User's use only and may not be sold, redistributed or otherwise used for commercial purposes  All illustrations and images included in CareNotes® are the copyrighted property of [employer]  or Baldev Newman

## 2018-11-23 NOTE — PROGRESS NOTES
Assessment/Plan:     Diagnoses and all orders for this visit:    Psoriasis  -     predniSONE 10 mg tablet; Take 1 tablet (10 mg total) by mouth 2 (two) times a day with meals    Pruritic rash  -     predniSONE 10 mg tablet; Take 1 tablet (10 mg total) by mouth 2 (two) times a day with meals    Cellulitis of right lower extremity  -     cephalexin (KEFLEX) 500 mg capsule; Take 1 capsule (500 mg total) by mouth every 6 (six) hours for 5 days        Subjective:      Patient ID: Remigio Barajas is a 63 y o  female  Patient presents to [address] [place] [address] with complaints of swollen ankle with exacerbation of psoriatic rash  Allergies, medical history and current medications reviewed with patient; patient reports taking all medications as prescribed without issues  Patient C/O psoriatic rash with associated redness and swelling to the right ankle/foot  Patient reports she had soaked her foot in lukewarm water and Dove soap, which seemed to help the redness but that the symptoms continue to persist  Patient reports she ususally doesn't get swelling and that she has had cellulitis secondary to psoriatic rash before  Patient reports she had seen Dermatology who prescribed several different ointments and creams, which were all ineffective  Patient currently uses OTC balms to control symptoms which usually control symptoms, but have not been effective for the last few days  Patient reports she is also following with Rheumatology for psoriatic arthritis  Patient also C/O an itchy rash to the left forearm  Patient states it tends to come and go, and was wider spread but seems to be dissipating  Patient reports she has sensitive skin, but denies any new lotions, soaps or detergents         Patient Care Team:  Selvin Witt as PCP - General (Family Medicine)  Chelo Cobos MD (Rheumatology)    Review of Systems   Constitutional: Negative for activity change, appetite change, chills, fatigue, fever and unexpected weight change  HENT: Negative for congestion, ear pain, hearing loss, postnasal drip, rhinorrhea, sinus pressure, sore throat and voice change  Eyes: Negative for pain, itching and visual disturbance  Respiratory: Negative for cough, chest tightness and shortness of breath  Cardiovascular: Negative for chest pain, palpitations and leg swelling  Gastrointestinal: Negative for abdominal pain, blood in stool, constipation, diarrhea, nausea and vomiting  Endocrine: Negative for cold intolerance and heat intolerance  Genitourinary: Negative for difficulty urinating, dysuria, frequency, hematuria and urgency  Musculoskeletal: Positive for joint swelling  Negative for arthralgias, back pain and myalgias  Skin: Positive for rash  Negative for color change and wound  Allergic/Immunologic: Negative  Neurological: Negative for dizziness, weakness, light-headedness, numbness and headaches  Hematological: Negative  Psychiatric/Behavioral: Negative  Objective:    /80 (BP Location: Right arm, Patient Position: Sitting, Cuff Size: Standard)   Pulse 65   Temp 98 9 °F (37 2 °C) (Temporal)   Ht 5' 3" (1 6 m)   Wt 85 2 kg (187 lb 12 8 oz)   SpO2 97%   BMI 33 27 kg/m²      Physical Exam   Constitutional: She is oriented to person, place, and time  She appears well-developed and well-nourished  No distress  HENT:   Head: Normocephalic and atraumatic  Right Ear: Tympanic membrane, external ear and ear canal normal    Left Ear: Tympanic membrane, external ear and ear canal normal    Nose: Nose normal  No mucosal edema  Right sinus exhibits no maxillary sinus tenderness and no frontal sinus tenderness  Left sinus exhibits no maxillary sinus tenderness and no frontal sinus tenderness  Mouth/Throat: Uvula is midline, oropharynx is clear and moist and mucous membranes are normal  No oropharyngeal exudate, posterior oropharyngeal edema or posterior oropharyngeal erythema  Nasal turbinates pink, moist and without exudate  Eyes: Pupils are equal, round, and reactive to light  Conjunctivae, EOM and lids are normal  Right eye exhibits no discharge  Left eye exhibits no discharge  Right conjunctiva is not injected  Left conjunctiva is not injected  Neck: Normal range of motion  Neck supple  No tracheal deviation, no edema and no erythema present  No thyromegaly present  Cardiovascular: Normal rate, regular rhythm, S1 normal, S2 normal and normal heart sounds  No murmur heard  Pulses:       Radial pulses are 2+ on the right side, and 2+ on the left side  Dorsalis pedis pulses are 2+ on the right side, and 2+ on the left side  Pulmonary/Chest: Effort normal and breath sounds normal  No respiratory distress  Abdominal: Soft  Bowel sounds are normal  She exhibits no distension and no mass  There is no hepatosplenomegaly  There is no tenderness  There is no guarding  Musculoskeletal: Normal range of motion  She exhibits no edema, tenderness or deformity  Lymphadenopathy:     She has no cervical adenopathy  Neurological: She is alert and oriented to person, place, and time  She has normal strength and normal reflexes  She displays a negative Romberg sign  Skin: Skin is warm, dry and intact  Psychiatric: She has a normal mood and affect  Her speech is normal    Nursing note and vitals reviewed

## 2018-11-30 ENCOUNTER — OFFICE VISIT (OUTPATIENT)
Dept: FAMILY MEDICINE CLINIC | Facility: CLINIC | Age: 58
End: 2018-11-30
Payer: COMMERCIAL

## 2018-11-30 VITALS
DIASTOLIC BLOOD PRESSURE: 64 MMHG | WEIGHT: 157.2 LBS | TEMPERATURE: 99.2 F | BODY MASS INDEX: 27.85 KG/M2 | HEART RATE: 56 BPM | SYSTOLIC BLOOD PRESSURE: 110 MMHG | OXYGEN SATURATION: 98 %

## 2018-11-30 DIAGNOSIS — L40.9 PSORIASIS: Primary | ICD-10-CM

## 2018-11-30 DIAGNOSIS — R21 RASH: ICD-10-CM

## 2018-11-30 PROCEDURE — 99213 OFFICE O/P EST LOW 20 MIN: CPT | Performed by: NURSE PRACTITIONER

## 2018-11-30 PROCEDURE — 1036F TOBACCO NON-USER: CPT | Performed by: NURSE PRACTITIONER

## 2018-11-30 NOTE — PATIENT INSTRUCTIONS
Psoriasis   WHAT YOU NEED TO KNOW:   What is psoriasis? Psoriasis is a long-term skin disease in which the skin cells grow faster than normal  This abnormal growth causes a buildup of cells on the surface of the skin  Red, raised patches that are covered with silver-colored scales form on your skin  What causes psoriasis? The exact cause of psoriasis is not known  A problem with the immune system sometimes causes your body to attack healthy skin cells  Psoriasis is more likely to occur if another family member also has psoriasis  Flare-ups of psoriasis come and go and are often caused by certain triggers  · Infections:  Germs, such as bacteria, viruses, or fungi, may trigger a flare-up  A flare-up of psoriasis usually follows a sore throat  · Medicines:  Certain medicines, such as those used to treat high blood pressure or depression, may trigger a psoriasis flare-up  · Skin damage:  Skin injuries, such as a cut or scrape, or a sunburn may increase your risk of a flare-up  · Smoking and alcohol:  Smoking and drinking alcohol may also increase your risk  · Stress: Both physical and emotional stress may lead to a flare-up of psoriasis  What are the signs and symptoms of psoriasis? The signs and symptoms usually depend on the type of psoriasis you have  · Plaque type: This is the most common and mildest type of psoriasis  Plaques are reddened patches covered with silver-colored scales  Your knees, elbows, scalp, stomach, and back are usually affected  You may also have nail changes, such as pitting, thickening, or lifting of the nails off the nail bed  · Guttate type: This type is the most common among children and young adults  It usually happens after a sore throat or other infections  This type looks like red, raised, pea-sized drops on your skin  · Inverse type: The plaques appear as smooth red patches and are often found in the moist areas of your body   It affects skin in the armpits, groin, under breasts, and around the genitals  · Erythrodermic type: This is a rare and severe type of psoriasis in which plaques cover large areas of the skin  These areas itch and are painful  · Pustular type:  Pustules (blisters with pus inside) or pimple-like lesions may appear on large red areas of the skin  Sometimes this type is limited to the palms of the hands and soles of the feet  · Psoriatic arthritis:  Some people who have psoriasis may also develop psoriatic arthritis  Psoriatic arthritis makes your joints swollen and painful  You may also have nail changes, such as pitting, thickening, or lifting of the nails off the nail bed  How is psoriasis diagnosed? Your healthcare provider will ask about your health history  He may also want to know if you have other family members who have psoriasis  Psoriasis is usually diagnosed after a careful examination of your skin, scalp, and nails  Blood tests and x-rays may also be needed  How is psoriasis treated? Treatment usually depends on the severity of the disease, size of the areas involved, and the type of psoriasis  · Topical medicine: These medicines are ointments, creams, and pastes that are applied on the skin  ¨ Moisturizers: These soothe your skin by keeping it moist and preventing dryness  ¨ Steroids: This medicine may be given to decrease inflammation  ¨ Vitamin D and retinoids: These are vitamin-based creams that are used to clear plaques  ¨ Anthralin:  This medicine decreases swelling and excess skin cells that form scales  ¨ Salicylic acid: This peeling agent helps decrease scaling of the skin and scalp  ¨ Tar preparations: These medicines decrease itching, scaling, and inflammation  They may be shampoos, creams, or bath oils  · Oral medicine: These medicines are used to treat serious types of psoriasis and are taken by mouth  They include steroids or retinoids   They may also include medicines that decrease the rate of growth of your skin cells or that affect your immune system  · Phototherapy:  You may need ultraviolet (UV) light treatments if your psoriasis is severe  Your skin is exposed to UV light for the period of time that your healthcare provider prescribes  What are the risks of psoriasis? Certain medicines used to treat psoriasis can cause burning, redness, and irritation of your skin  They can also cause drowsiness, high blood pressure, or birth defects  Without treatment, your signs and symptoms may worsen  Psoriasis may cause severe itching, swelling, and infection  You may also bleed more easily  How can I manage my psoriasis? · Take care of your skin:  Apply emollients, lubricants, or moisturizing creams to your skin regularly  Apply these while your skin is still damp when you bathe  Stop using them if they sting or irritate your skin  Use mild soaps and add bath oils to soothe your skin when you bathe  · Protect your skin from sun exposure:  When you get sun exposure for short periods of time, it can help your psoriasis  Too much sun exposure or a sunburn can make your psoriasis worse  Talk to your dermatologist or healthcare provider about how much sun exposure is right for you  · Manage stress:  Stress can trigger a flare-up  Find healthy ways to manage stress, such as deep breathing or meditation  · Watch for symptoms with new medicines or herbal supplements:  Some medicines, including herbal supplements, may trigger a psoriasis flare-up  Ask if any of the medicines you take may be making your psoriasis worse  Always check for skin changes when you take your medicines  · Do not smoke:  Smoking can trigger a flare-up of psoriasis  If you smoke, it is never too late to quit  Ask for information about how to stop  · Avoid triggers:  Injury to the skin, cold weather, and heavy alcohol use are other things that can trigger psoriasis flare-ups    When should I contact my healthcare provider? · You get pregnant  · You have a fever  · Your skin plaques are not getting better or are getting worse  · You cannot sleep because your skin itches  · Your skin plaques have pus draining from them or they have soft yellow scabs  · You have questions or concerns about your condition or care  When should I seek immediate care? · Psoriasis suddenly covers larger areas of your body and becomes more painful  CARE AGREEMENT:   You have the right to help plan your care  Learn about your health condition and how it may be treated  Discuss treatment options with your caregivers to decide what care you want to receive  You always have the right to refuse treatment  The above information is an  only  It is not intended as medical advice for individual conditions or treatments  Talk to your doctor, nurse or pharmacist before following any medical regimen to see if it is safe and effective for you  © 2017 [employer] Information is for End User's use only and may not be sold, redistributed or otherwise used for commercial purposes  All illustrations and images included in CareNotes® are the copyrighted property of [employer]  or [employer]

## 2018-11-30 NOTE — PROGRESS NOTES
Assessment/Plan:     Diagnoses and all orders for this visit:    Psoriasis    Rash        Subjective:      Patient ID: Jennifer Heath is a 63 y o  female  Patient presents to [address] [place] [address] as followup on rash  Allergies, medical history and current medications reviewed with patient; patient reports taking all medications as prescribed without issues  Patient reports she is doing much better today, and that the swelling of the foot has since resolved  Patient reports the rash to the left forearm has also greatly improved  Patient states she has started applying petroleum gauze every night to the psoriasis rash on the foot, which improves symptoms of itching and irritation greatly  Patient has no other complaints at this time  Patient Care Team:  Osmar Kumar as PCP - General (Family Medicine)  Trupti Cobos MD (Rheumatology)    Review of Systems   Skin: Positive for rash  All other systems reviewed and are negative  Objective:    /64 (BP Location: Right arm, Patient Position: Sitting, Cuff Size: Standard)   Pulse 56   Temp 99 2 °F (37 3 °C) (Temporal)   Wt 71 3 kg (157 lb 3 2 oz)   SpO2 98%   BMI 27 85 kg/m²      Physical Exam   Constitutional: She is oriented to person, place, and time  She appears well-developed and well-nourished  No distress  HENT:   Head: Normocephalic and atraumatic  Right Ear: Tympanic membrane, external ear and ear canal normal    Left Ear: Tympanic membrane, external ear and ear canal normal    Nose: Nose normal  No mucosal edema  Right sinus exhibits no maxillary sinus tenderness and no frontal sinus tenderness  Left sinus exhibits no maxillary sinus tenderness and no frontal sinus tenderness  Mouth/Throat: Uvula is midline, oropharynx is clear and moist and mucous membranes are normal  No oropharyngeal exudate, posterior oropharyngeal edema or posterior oropharyngeal erythema     Nasal turbinates pink, moist and without exudate  Eyes: Pupils are equal, round, and reactive to light  Conjunctivae, EOM and lids are normal  Right eye exhibits no discharge  Left eye exhibits no discharge  Right conjunctiva is not injected  Left conjunctiva is not injected  Neck: Normal range of motion  Neck supple  No tracheal deviation, no edema and no erythema present  No thyromegaly present  Cardiovascular: Normal rate, regular rhythm, S1 normal, S2 normal and normal heart sounds  No murmur heard  Pulses:       Radial pulses are 2+ on the right side, and 2+ on the left side  Dorsalis pedis pulses are 2+ on the right side, and 2+ on the left side  Pulmonary/Chest: Effort normal and breath sounds normal  No respiratory distress  Abdominal: Soft  Bowel sounds are normal  She exhibits no distension and no mass  There is no hepatosplenomegaly  There is no tenderness  There is no guarding  Musculoskeletal: Normal range of motion  She exhibits no edema, tenderness or deformity  Lymphadenopathy:     She has no cervical adenopathy  Neurological: She is alert and oriented to person, place, and time  She has normal strength and normal reflexes  She displays a negative Romberg sign  Skin: Skin is warm, dry and intact  Psychiatric: She has a normal mood and affect  Her speech is normal    Nursing note and vitals reviewed

## 2018-12-12 ENCOUNTER — OFFICE VISIT (OUTPATIENT)
Dept: FAMILY MEDICINE CLINIC | Facility: CLINIC | Age: 58
End: 2018-12-12
Payer: COMMERCIAL

## 2018-12-12 VITALS
DIASTOLIC BLOOD PRESSURE: 64 MMHG | OXYGEN SATURATION: 94 % | BODY MASS INDEX: 27.57 KG/M2 | WEIGHT: 155.6 LBS | TEMPERATURE: 98.9 F | HEART RATE: 54 BPM | SYSTOLIC BLOOD PRESSURE: 110 MMHG | HEIGHT: 63 IN

## 2018-12-12 DIAGNOSIS — R21 RASH: ICD-10-CM

## 2018-12-12 DIAGNOSIS — L40.9 PSORIASIS: Primary | ICD-10-CM

## 2018-12-12 PROCEDURE — 3008F BODY MASS INDEX DOCD: CPT | Performed by: NURSE PRACTITIONER

## 2018-12-12 PROCEDURE — 99213 OFFICE O/P EST LOW 20 MIN: CPT | Performed by: NURSE PRACTITIONER

## 2018-12-12 RX ORDER — BETAMETHASONE DIPROPIONATE 0.5 MG/G
CREAM TOPICAL 2 TIMES DAILY
Qty: 30 G | Refills: 0 | Status: SHIPPED | OUTPATIENT
Start: 2018-12-12 | End: 2019-10-31 | Stop reason: SDUPTHER

## 2018-12-12 RX ORDER — CALCIPOTRIENE 50 UG/G
OINTMENT TOPICAL 2 TIMES DAILY
Qty: 60 G | Refills: 5 | Status: SHIPPED | OUTPATIENT
Start: 2018-12-12 | End: 2022-02-07 | Stop reason: ALTCHOICE

## 2018-12-12 NOTE — PROGRESS NOTES
Assessment/Plan:     Diagnoses and all orders for this visit:    Psoriasis  -     calcipotriene (DOVONOX) 0 005 % ointment; Apply topically 2 (two) times a day  -     betamethasone dipropionate (DIPROSONE) 0 05 % cream; Apply topically 2 (two) times a day    Rash  -     betamethasone dipropionate (DIPROSONE) 0 05 % cream; Apply topically 2 (two) times a day        Subjective:      Patient ID: Ema Yang is a 63 y o  female  Patient presents to [address] [place] [address] with complaints of psoriatic rash flare up  Allergies, medical history and current medications reviewed with patient; patient reports taking all medications as prescribed without issues  Patient C/O psoriatic rash to right foot  Patient had been previously prescribed Prednisone, which cleared up the rash for about a week  Patient reports the skin that previously healed has since broken back open  Patient had seen Dermatology "years ago" but reports she has not followed with Dermatology since as her symptoms had been manageable and consistent  Patient reports using petroleum gauze to facilitate moisture retention so that she can walk without pain  Patient Care Team:  Ralph Patel as PCP - General (Family Medicine)  Micky Cobos MD (Rheumatology)    Review of Systems   Skin: Positive for rash  All other systems reviewed and are negative  Objective:    /64 (BP Location: Right arm, Patient Position: Sitting, Cuff Size: Standard)   Pulse (!) 54   Temp 98 9 °F (37 2 °C) (Temporal)   Ht 5' 3" (1 6 m)   Wt 70 6 kg (155 lb 9 6 oz)   SpO2 94%   BMI 27 56 kg/m²      Physical Exam   Constitutional: She is oriented to person, place, and time  She appears well-developed and well-nourished  No distress  HENT:   Head: Normocephalic and atraumatic  Right Ear: External ear normal    Left Ear: External ear normal    Nose: Nose normal    Eyes: Conjunctivae and lids are normal  Right eye exhibits no discharge   Left eye exhibits no discharge  Right conjunctiva is not injected  Left conjunctiva is not injected  Neck: Normal range of motion  Neck supple  No tracheal deviation present  Cardiovascular: Normal rate, regular rhythm, S1 normal, S2 normal and normal heart sounds  No murmur heard  Pulmonary/Chest: Effort normal and breath sounds normal  No respiratory distress  Abdominal: Bowel sounds are normal  She exhibits no distension  There is no guarding  Musculoskeletal: Normal range of motion  She exhibits no edema, tenderness or deformity  Neurological: She is alert and oriented to person, place, and time  Skin: Skin is warm, dry and intact  Rash (right foot) noted  Psychiatric: She has a normal mood and affect  Her speech is normal    Nursing note and vitals reviewed

## 2018-12-12 NOTE — PATIENT INSTRUCTIONS
Psoriasis   WHAT YOU NEED TO KNOW:   What is psoriasis? Psoriasis is a long-term skin disease in which the skin cells grow faster than normal  This abnormal growth causes a buildup of cells on the surface of the skin  Red, raised patches that are covered with silver-colored scales form on your skin  What causes psoriasis? The exact cause of psoriasis is not known  A problem with the immune system sometimes causes your body to attack healthy skin cells  Psoriasis is more likely to occur if another family member also has psoriasis  Flare-ups of psoriasis come and go and are often caused by certain triggers  · Infections:  Germs, such as bacteria, viruses, or fungi, may trigger a flare-up  A flare-up of psoriasis usually follows a sore throat  · Medicines:  Certain medicines, such as those used to treat high blood pressure or depression, may trigger a psoriasis flare-up  · Skin damage:  Skin injuries, such as a cut or scrape, or a sunburn may increase your risk of a flare-up  · Smoking and alcohol:  Smoking and drinking alcohol may also increase your risk  · Stress: Both physical and emotional stress may lead to a flare-up of psoriasis  What are the signs and symptoms of psoriasis? The signs and symptoms usually depend on the type of psoriasis you have  · Plaque type: This is the most common and mildest type of psoriasis  Plaques are reddened patches covered with silver-colored scales  Your knees, elbows, scalp, stomach, and back are usually affected  You may also have nail changes, such as pitting, thickening, or lifting of the nails off the nail bed  · Guttate type: This type is the most common among children and young adults  It usually happens after a sore throat or other infections  This type looks like red, raised, pea-sized drops on your skin  · Inverse type: The plaques appear as smooth red patches and are often found in the moist areas of your body   It affects skin in the armpits, groin, under breasts, and around the genitals  · Erythrodermic type: This is a rare and severe type of psoriasis in which plaques cover large areas of the skin  These areas itch and are painful  · Pustular type:  Pustules (blisters with pus inside) or pimple-like lesions may appear on large red areas of the skin  Sometimes this type is limited to the palms of the hands and soles of the feet  · Psoriatic arthritis:  Some people who have psoriasis may also develop psoriatic arthritis  Psoriatic arthritis makes your joints swollen and painful  You may also have nail changes, such as pitting, thickening, or lifting of the nails off the nail bed  How is psoriasis diagnosed? Your healthcare provider will ask about your health history  He may also want to know if you have other family members who have psoriasis  Psoriasis is usually diagnosed after a careful examination of your skin, scalp, and nails  Blood tests and x-rays may also be needed  How is psoriasis treated? Treatment usually depends on the severity of the disease, size of the areas involved, and the type of psoriasis  · Topical medicine: These medicines are ointments, creams, and pastes that are applied on the skin  ¨ Moisturizers: These soothe your skin by keeping it moist and preventing dryness  ¨ Steroids: This medicine may be given to decrease inflammation  ¨ Vitamin D and retinoids: These are vitamin-based creams that are used to clear plaques  ¨ Anthralin:  This medicine decreases swelling and excess skin cells that form scales  ¨ Salicylic acid: This peeling agent helps decrease scaling of the skin and scalp  ¨ Tar preparations: These medicines decrease itching, scaling, and inflammation  They may be shampoos, creams, or bath oils  · Oral medicine: These medicines are used to treat serious types of psoriasis and are taken by mouth  They include steroids or retinoids   They may also include medicines that decrease the rate of growth of your skin cells or that affect your immune system  · Phototherapy:  You may need ultraviolet (UV) light treatments if your psoriasis is severe  Your skin is exposed to UV light for the period of time that your healthcare provider prescribes  What are the risks of psoriasis? Certain medicines used to treat psoriasis can cause burning, redness, and irritation of your skin  They can also cause drowsiness, high blood pressure, or birth defects  Without treatment, your signs and symptoms may worsen  Psoriasis may cause severe itching, swelling, and infection  You may also bleed more easily  How can I manage my psoriasis? · Take care of your skin:  Apply emollients, lubricants, or moisturizing creams to your skin regularly  Apply these while your skin is still damp when you bathe  Stop using them if they sting or irritate your skin  Use mild soaps and add bath oils to soothe your skin when you bathe  · Protect your skin from sun exposure:  When you get sun exposure for short periods of time, it can help your psoriasis  Too much sun exposure or a sunburn can make your psoriasis worse  Talk to your dermatologist or healthcare provider about how much sun exposure is right for you  · Manage stress:  Stress can trigger a flare-up  Find healthy ways to manage stress, such as deep breathing or meditation  · Watch for symptoms with new medicines or herbal supplements:  Some medicines, including herbal supplements, may trigger a psoriasis flare-up  Ask if any of the medicines you take may be making your psoriasis worse  Always check for skin changes when you take your medicines  · Do not smoke:  Smoking can trigger a flare-up of psoriasis  If you smoke, it is never too late to quit  Ask for information about how to stop  · Avoid triggers:  Injury to the skin, cold weather, and heavy alcohol use are other things that can trigger psoriasis flare-ups    When should I contact my healthcare provider? · You get pregnant  · You have a fever  · Your skin plaques are not getting better or are getting worse  · You cannot sleep because your skin itches  · Your skin plaques have pus draining from them or they have soft yellow scabs  · You have questions or concerns about your condition or care  When should I seek immediate care? · Psoriasis suddenly covers larger areas of your body and becomes more painful  CARE AGREEMENT:   You have the right to help plan your care  Learn about your health condition and how it may be treated  Discuss treatment options with your caregivers to decide what care you want to receive  You always have the right to refuse treatment  The above information is an  only  It is not intended as medical advice for individual conditions or treatments  Talk to your doctor, nurse or pharmacist before following any medical regimen to see if it is safe and effective for you  © 2017 [employer] Information is for End User's use only and may not be sold, redistributed or otherwise used for commercial purposes  All illustrations and images included in CareNotes® are the copyrighted property of [employer]  or [employer]  Calcipotriene (On the skin)   Calcipotriene (nlw-[id number]-bid-TRYE-een)  Treats plaque psoriasis in adults  Brand Name(s): Calcitrene, Dovonex, Sorilux   There may be other brand names for this medicine  When This Medicine Should Not Be Used: This medicine is not right for everyone  Do not use it if you had an allergic reaction to calcipotriene or you have hypercalcemia  How to Use This Medicine:   Cream, Foam, Liquid, Ointment  · Your doctor will tell you how much medicine to use  Do not use more than directed  · Use this medicine only on your skin  Rinse it off right away if it gets on a cut or scrape  Do not get the medicine in your eyes, nose, or mouth    · Wash your hands with soap and water before and after you use this medicine  If the skin on your hands is being treated with the medicine, only wash the skin that is not affected  · To use this medicine:   ¨ Cream or ointment: Apply a thin layer to the red, scaly patches on your skin  Rub it in gently until it disappears into the skin  ¨ Liquid: First comb your dry hair to remove extra skin flakes and scales  Part your hair so you can see the patches on your scalp  Then apply the liquid only to the affected patches  Rub it in gently until it disappears into the scalp  ¨ Foam: Break the plastic piece on the nozzle of the can  Shake the can before each use  Turn the can upside down and place a small amount into the palm of your hand  Apply a thin layer to the affected area of the skin  Rub it in gently until it disappears into the skin  If using on the scalp, apply when your hair is dry  · The liquid and foam are flammable and could catch on fire  Do not use these medicines near heat, an open flame, or while smoking  Do not poke holes in the foam canister or throw it into a fire, even if the canister is empty  Do not keep the foam inside a car where it could be exposed to extreme heat  · Read and follow the patient instructions that come with this medicine  Talk to your doctor or pharmacist if you have any questions  · Missed dose: Apply a dose as soon as you can  If it is almost time for your next dose, wait until then and apply a regular dose  Do not apply extra medicine to make up for a missed dose  · Store the medicine in a closed container at room temperature, away from heat, moisture, and direct light  Do not freeze  Drugs and Foods to Avoid:   Ask your doctor or pharmacist before using any other medicine, including over-the-counter medicines, vitamins, and herbal products  · Tell your doctor if you are getting light therapy for your psoriasis  · Do not put cosmetics or skin care products on the treated skin    Warnings While Using This Medicine:   · Tell your doctor if you are pregnant or breastfeeding, or if you have any medical problems  · This medicine may make your skin more sensitive to sunlight  Wear sunscreen  Do not use sunlamps or tanning beds  · Do not use this medicine to treat a skin problem your doctor has not examined  Do not use the medicine in or near your face, eyes, mouth, or vagina  · Call your doctor if your symptoms do not improve or if they get worse  · Your doctor will check your progress and the effects of this medicine at regular visits  Keep all appointments  · Keep all medicine out of the reach of children  Never share your medicine with anyone  Possible Side Effects While Using This Medicine:   Call your doctor right away if you notice any of these side effects:  · Allergic reaction: Itching or hives, swelling in your face or hands, swelling or tingling in your mouth or throat, chest tightness, trouble breathing  · Extreme confusion or mood swings  · Increase in thirst or how often you urinate  · Muscle weakness  · Severe stomach pain, nausea, vomiting  · Skin burning, pain, redness, or peeling  · Uneven heartbeat  · Worsening of psoriasis  If you notice these less serious side effects, talk with your doctor:   · Constipation  · Loss of appetite  · Mild redness or pain where you apply the medicine  If you notice other side effects that you think are caused by this medicine, tell your doctor  Call your doctor for medical advice about side effects  You may report side effects to FDA at 5-411-FDA-5705  © 2017 [employer] Information is for End User's use only and may not be sold, redistributed or otherwise used for commercial purposes  The above information is an  only  It is not intended as medical advice for individual conditions or treatments   Talk to your doctor, nurse or pharmacist before following any medical regimen to see if it is safe and effective for you     Betamethasone Dipropionate (On the skin)   Betamethasone Dipropionate (bay-ta-METH-a-sone dye-PROE-pee-oh-dorcas)  Treats skin pain, swelling, and itching  This medicine is a corticosteroid  Brand Name(s):   There may be other brand names for this medicine  When This Medicine Should Not Be Used: This medicine is not right for everyone  Do not use it if you had an allergic reaction to betamethasone or to similar medicines  How to Use This Medicine:   Cream, Foam, Gel/Jelly, Lotion, Ointment, Spray  · Use this medicine as directed  · Use this medicine only on your skin  Rinse it off right away if it gets on a cut or scrape  Do not get the medicine in your eyes, nose, or mouth  · Wash your hands with soap and water before and after you use this medicine  · Cream, gel, lotion, or ointment: Apply a thin layer of the medicine to the affected area  Rub it in gently  · Aerosol foam: Turn the can upside down and squirt a small amount onto a small plate or other cool surface  Then  small amounts of the foam and massage it into your scalp  Do not squirt the foam onto your hands because it will melt if you hold it too long  · Do not cover the treated area with a bandage unless directed by your doctor  · Missed dose: Apply a dose as soon as you can  If it is almost time for your next dose, wait until then and apply a regular dose  Do not apply extra medicine to make up for a missed dose  · Store the medicine at room temperature, away from heat and direct light  · The aerosol foam is flammable  Do not use it near heat, open flame, or while smoking  Do not puncture, break, or burn the aerosol can  Drugs and Foods to Avoid:      Ask your doctor or pharmacist before using any other medicine, including over-the-counter medicines, vitamins, and herbal products  Warnings While Using This Medicine:   · Tell your doctor if you are pregnant or breastfeeding    · This medicine may cause adrenal gland problems, such as Cushing syndrome or high blood sugar  · Do not use this medicine to treat a skin problem your doctor has not examined  · If your symptoms do not improve after 2 weeks of treatment, or if they get worse, check with your doctor  · Keep all medicine out of the reach of children  Never share your medicine with anyone  Possible Side Effects While Using This Medicine:   Call your doctor right away if you notice any of these side effects:  · Allergic reaction: Itching or hives, swelling in your face or hands, swelling or tingling in your mouth or throat, chest tightness, trouble breathing  · Color changes on the skin, dark freckles, easy bruising, muscle weakness  · Severe itching, burning, or skin irritation  · Signs of skin infection, such as redness, swelling, drainage, or pus  · Weight gain around your neck, upper back, breast, face, or waist  If you notice these less serious side effects, talk with your doctor:   · Mild stinging, burning, itching, redness, or dryness at the application site  If you notice other side effects that you think are caused by this medicine, tell your doctor  Call your doctor for medical advice about side effects  You may report side effects to FDA at 7-813-FDA-7579  © 2017 [employer] [employer] Information is for End User's use only and may not be sold, redistributed or otherwise used for commercial purposes  The above information is an  only  It is not intended as medical advice for individual conditions or treatments  Talk to your doctor, nurse or pharmacist before following any medical regimen to see if it is safe and effective for you

## 2019-03-21 ENCOUNTER — OFFICE VISIT (OUTPATIENT)
Dept: FAMILY MEDICINE CLINIC | Facility: CLINIC | Age: 59
End: 2019-03-21
Payer: COMMERCIAL

## 2019-03-21 VITALS
WEIGHT: 155.2 LBS | BODY MASS INDEX: 27.5 KG/M2 | HEART RATE: 76 BPM | SYSTOLIC BLOOD PRESSURE: 120 MMHG | DIASTOLIC BLOOD PRESSURE: 78 MMHG | OXYGEN SATURATION: 98 % | TEMPERATURE: 99.3 F | HEIGHT: 63 IN

## 2019-03-21 DIAGNOSIS — J01.00 ACUTE NON-RECURRENT MAXILLARY SINUSITIS: Primary | ICD-10-CM

## 2019-03-21 PROCEDURE — 99213 OFFICE O/P EST LOW 20 MIN: CPT | Performed by: NURSE PRACTITIONER

## 2019-03-21 PROCEDURE — 1036F TOBACCO NON-USER: CPT | Performed by: NURSE PRACTITIONER

## 2019-03-21 PROCEDURE — 3008F BODY MASS INDEX DOCD: CPT | Performed by: NURSE PRACTITIONER

## 2019-03-21 RX ORDER — AMOXICILLIN AND CLAVULANATE POTASSIUM 875; 125 MG/1; MG/1
1 TABLET, FILM COATED ORAL EVERY 12 HOURS SCHEDULED
Qty: 20 TABLET | Refills: 0 | Status: SHIPPED | OUTPATIENT
Start: 2019-03-21 | End: 2019-03-31

## 2019-03-21 RX ORDER — AMOXICILLIN AND CLAVULANATE POTASSIUM 875; 125 MG/1; MG/1
1 TABLET, FILM COATED ORAL EVERY 12 HOURS SCHEDULED
Qty: 20 TABLET | Refills: 0 | Status: SHIPPED | OUTPATIENT
Start: 2019-03-21 | End: 2019-03-21 | Stop reason: SDUPTHER

## 2019-03-21 RX ORDER — IBUPROFEN 800 MG/1
TABLET ORAL
COMMUNITY
Start: 2019-03-18 | End: 2019-04-18 | Stop reason: ALTCHOICE

## 2019-03-21 RX ORDER — PREDNISONE 10 MG/1
TABLET ORAL
Qty: 30 TABLET | Refills: 0 | Status: SHIPPED | OUTPATIENT
Start: 2019-03-21 | End: 2019-04-02

## 2019-03-21 NOTE — PROGRESS NOTES
Assessment/Plan:     Diagnoses and all orders for this visit:    Acute non-recurrent maxillary sinusitis  -     predniSONE 10 mg tablet; Take 4 tablets (40 mg total) by mouth daily for 3 days, THEN 3 tablets (30 mg total) daily for 3 days, THEN 2 tablets (20 mg total) daily for 3 days, THEN 1 tablet (10 mg total) daily for 3 days  -     Discontinue: amoxicillin-clavulanate (AUGMENTIN) 875-125 mg per tablet; Take 1 tablet by mouth every 12 (twelve) hours for 10 days  -     amoxicillin-clavulanate (AUGMENTIN) 875-125 mg per tablet; Take 1 tablet by mouth every 12 (twelve) hours for 10 days    Other orders  -     ibuprofen (MOTRIN) 800 mg tablet        Subjective:      Patient ID: Shanthi Lewis is a 63 y o  female  Patient presents to [address] [place] [address] with complaints of cold-like symptoms  Allergies, medical history and current medications reviewed with patient; patient reports taking all medications as prescribed without issues  Patient C/O nasal and head congestion with PND, ongoing for about 3 days  Patient reports she has been using OTC Tylenol and Doneta Hof which has not been effective  Patient Care Team:  Candelaria Urias as PCP - General (Family Medicine)  Radha Cobos MD (Rheumatology)    Review of Systems   HENT: Positive for congestion and postnasal drip  All other systems reviewed and are negative  Objective:    /78 (BP Location: Right arm, Patient Position: Sitting, Cuff Size: Standard)   Pulse 76   Temp 99 3 °F (37 4 °C) (Temporal)   Ht 5' 3" (1 6 m)   Wt 70 4 kg (155 lb 3 2 oz)   SpO2 98%   BMI 27 49 kg/m²      Physical Exam   Constitutional: She is oriented to person, place, and time  She appears well-developed and well-nourished  No distress  HENT:   Head: Normocephalic and atraumatic  Right Ear: External ear and ear canal normal  Tympanic membrane is scarred  Left Ear: External ear normal  There is drainage (excess cerumen)   Tympanic membrane is scarred  Nose: Mucosal edema present  Right sinus exhibits no maxillary sinus tenderness and no frontal sinus tenderness  Left sinus exhibits no maxillary sinus tenderness and no frontal sinus tenderness  Mouth/Throat: Uvula is midline, oropharynx is clear and moist and mucous membranes are normal    Nasal turbinates red, moist and without exudate  Eyes: Conjunctivae and lids are normal  Right eye exhibits no discharge  Left eye exhibits no discharge  Right conjunctiva is not injected  Left conjunctiva is not injected  Neck: Normal range of motion  No tracheal deviation present  Cardiovascular: Normal rate, regular rhythm, S1 normal, S2 normal and normal heart sounds  No murmur heard  Pulmonary/Chest: Effort normal and breath sounds normal  No respiratory distress  Abdominal: Normal appearance  She exhibits no distension  There is no guarding  Musculoskeletal: Normal range of motion  She exhibits no edema, tenderness or deformity  Neurological: She is alert and oriented to person, place, and time  Skin: Skin is warm, dry and intact  Psychiatric: She has a normal mood and affect  Her speech is normal    Nursing note and vitals reviewed

## 2019-03-21 NOTE — PATIENT INSTRUCTIONS
Sinusitis   WHAT YOU NEED TO KNOW:   What is sinusitis? Sinusitis is inflammation or infection of your sinuses  It is most often caused by a virus  Acute sinusitis may last up to 12 weeks  Chronic sinusitis lasts longer than 12 weeks  Recurrent sinusitis means you have 4 or more times in 1 year  What increases my risk for sinusitis? · Medical conditions, such as an upper respiratory infection, allergies, asthma, or cystic fibrosis    · Dental infections or procedures, such as gum infections, tooth decay, or a root canal    · Smoking    · Abnormal sinus structure, such as nasal growths, swollen tonsils, or a deviated septum    · A weak immune system, from diseases such as diabetes or HIV  What are the signs and symptoms of sinusitis? · Fever    · Pain, pressure, redness, or swelling around the forehead, cheeks, or eyes    · Thick yellow or green discharge from your nose    · Tenderness when you touch your face over your sinuses    · Dry cough that happens mostly at night or when you lie down    · Headache and face pain that is worse when you lean forward    · Tooth pain, or pain when you chew  How is sinusitis diagnosed? Your healthcare provider will examine you and ask about your symptoms  He or she will check inside your nose using a nasal speculum  This is a small tool used to open your nostrils  A sample of the mucus from your nose may show what germ is causing your infection  How is sinusitis treated? Your symptoms may go away on their own  Your healthcare provider may recommend watchful waiting for up to 10 days before starting antibiotics  You may  need any of the following:  · Acetaminophen  decreases pain and fever  It is available without a doctor's order  Ask how much to take and how often to take it  Follow directions   Read the labels of all other medicines you are using to see if they also contain acetaminophen, or ask your doctor or pharmacist  Acetaminophen can cause liver damage if not taken correctly  Do not use more than 4 grams (4,000 milligrams) total of acetaminophen in one day  · NSAIDs , such as ibuprofen, help decrease swelling, pain, and fever  This medicine is available with or without a doctor's order  NSAIDs can cause stomach bleeding or kidney problems in certain people  If you take blood thinner medicine, always ask your healthcare provider if NSAIDs are safe for you  Always read the medicine label and follow directions  · Nasal steroid sprays  may help decrease inflammation in your nose and sinuses  · Decongestants  help reduce swelling and drain mucus in the nose and sinuses  They may help you breathe easier  · Antihistamines  help dry mucus in the nose and relieve sneezing  · Antibiotics  help treat or prevent a bacterial infection  How can I manage my symptoms? · Rinse your sinuses  Use a sinus rinse device to rinse your nasal passages with a saline (salt water) solution or distilled water  Do not use tap water  This will help thin the mucus in your nose and rinse away pollen and dirt  It will also help reduce swelling so you can breathe normally  Ask your healthcare provider how often to do this  · Breathe in steam   Heat a bowl of water until you see steam  Lean over the bowl and make a tent over your head with a large towel  Breathe deeply for about 20 minutes  Be careful not to get too close to the steam or burn yourself  Do this 3 times a day  You can also breathe deeply when you take a hot shower  · Sleep with your head elevated  Place an extra pillow under your head before you go to sleep to help your sinuses drain  · Drink liquids as directed  Ask your healthcare provider how much liquid to drink each day and which liquids are best for you  Liquids will thin the mucus in your nose and help it drain  Avoid drinks that contain alcohol or caffeine  · Do not smoke, and avoid secondhand smoke    Nicotine and other chemicals in cigarettes and cigars can make your symptoms worse  Ask your healthcare provider for information if you currently smoke and need help to quit  E-cigarettes or smokeless tobacco still contain nicotine  Talk to your healthcare provider before you use these products  How can I help prevent the spread of germs that cause sinusitis? Wash your hands often with soap and water  Wash your hands after you use the bathroom, change a child's diaper, or sneeze  Wash your hands before you prepare or eat food  When should I seek immediate care? · Your eye and eyelid are red, swollen, and painful  · You cannot open your eye  · You have vision changes, such as double vision  · Your eyeball bulges out or you cannot move your eye  · You are more sleepy than normal, or you notice changes in your ability to think, move, or talk  · You have a stiff neck, a fever, or a bad headache  · You have swelling of your forehead or scalp  When should I contact my healthcare provider? · Your symptoms do not improve after 3 days  · Your symptoms do not go away after 10 days  · You have nausea and are vomiting  · Your nose is bleeding  · You have questions or concerns about your condition or care  CARE AGREEMENT:   You have the right to help plan your care  Learn about your health condition and how it may be treated  Discuss treatment options with your caregivers to decide what care you want to receive  You always have the right to refuse treatment  The above information is an  only  It is not intended as medical advice for individual conditions or treatments  Talk to your doctor, nurse or pharmacist before following any medical regimen to see if it is safe and effective for you  © 2017 [employer] Information is for End User's use only and may not be sold, redistributed or otherwise used for commercial purposes   All illustrations and images included in CareNotes® are the copyrighted property of [employer]  or [employer]dev Newman

## 2019-04-04 ENCOUNTER — TELEPHONE (OUTPATIENT)
Dept: FAMILY MEDICINE CLINIC | Facility: CLINIC | Age: 59
End: 2019-04-04

## 2019-04-18 ENCOUNTER — OFFICE VISIT (OUTPATIENT)
Dept: FAMILY MEDICINE CLINIC | Facility: CLINIC | Age: 59
End: 2019-04-18
Payer: COMMERCIAL

## 2019-04-18 VITALS
BODY MASS INDEX: 27.96 KG/M2 | DIASTOLIC BLOOD PRESSURE: 60 MMHG | TEMPERATURE: 97.7 F | OXYGEN SATURATION: 98 % | RESPIRATION RATE: 18 BRPM | HEART RATE: 58 BPM | SYSTOLIC BLOOD PRESSURE: 110 MMHG | HEIGHT: 63 IN | WEIGHT: 157.8 LBS

## 2019-04-18 DIAGNOSIS — E55.9 VITAMIN D DEFICIENCY: ICD-10-CM

## 2019-04-18 DIAGNOSIS — E03.9 ACQUIRED HYPOTHYROIDISM: ICD-10-CM

## 2019-04-18 DIAGNOSIS — E53.8 VITAMIN B12 DEFICIENCY: ICD-10-CM

## 2019-04-18 DIAGNOSIS — J30.89 SEASONAL ALLERGIC RHINITIS DUE TO OTHER ALLERGIC TRIGGER: ICD-10-CM

## 2019-04-18 DIAGNOSIS — Z13.0 SCREENING FOR DEFICIENCY ANEMIA: ICD-10-CM

## 2019-04-18 DIAGNOSIS — L40.9 PSORIASIS: ICD-10-CM

## 2019-04-18 DIAGNOSIS — H61.22 IMPACTED CERUMEN OF LEFT EAR: ICD-10-CM

## 2019-04-18 DIAGNOSIS — M35.3 POLYMYALGIA (HCC): ICD-10-CM

## 2019-04-18 DIAGNOSIS — M79.642 PAIN IN BOTH HANDS: ICD-10-CM

## 2019-04-18 DIAGNOSIS — M25.50 POLYARTHRALGIA: ICD-10-CM

## 2019-04-18 DIAGNOSIS — Z13.220 SCREENING FOR HYPERLIPIDEMIA: ICD-10-CM

## 2019-04-18 DIAGNOSIS — I10 ESSENTIAL HYPERTENSION: ICD-10-CM

## 2019-04-18 DIAGNOSIS — L40.50 PSORIATIC ARTHRITIS (HCC): Primary | ICD-10-CM

## 2019-04-18 DIAGNOSIS — M79.641 PAIN IN BOTH HANDS: ICD-10-CM

## 2019-04-18 DIAGNOSIS — Z13.1 SCREENING FOR DIABETES MELLITUS: ICD-10-CM

## 2019-04-18 DIAGNOSIS — R60.0 LOCALIZED EDEMA: ICD-10-CM

## 2019-04-18 PROCEDURE — 3078F DIAST BP <80 MM HG: CPT | Performed by: NURSE PRACTITIONER

## 2019-04-18 PROCEDURE — 3008F BODY MASS INDEX DOCD: CPT | Performed by: NURSE PRACTITIONER

## 2019-04-18 PROCEDURE — 1036F TOBACCO NON-USER: CPT | Performed by: NURSE PRACTITIONER

## 2019-04-18 PROCEDURE — 3074F SYST BP LT 130 MM HG: CPT | Performed by: NURSE PRACTITIONER

## 2019-04-18 PROCEDURE — 99214 OFFICE O/P EST MOD 30 MIN: CPT | Performed by: NURSE PRACTITIONER

## 2019-04-18 RX ORDER — METOPROLOL SUCCINATE 50 MG/1
50 TABLET, EXTENDED RELEASE ORAL DAILY
Qty: 90 TABLET | Refills: 1 | Status: SHIPPED | OUTPATIENT
Start: 2019-04-18 | End: 2019-10-31 | Stop reason: SDUPTHER

## 2019-04-18 RX ORDER — FLUTICASONE PROPIONATE 50 MCG
SPRAY, SUSPENSION (ML) NASAL
Qty: 1 BOTTLE | Refills: 5 | Status: SHIPPED | OUTPATIENT
Start: 2019-04-18 | End: 2020-03-18 | Stop reason: SDUPTHER

## 2019-04-18 RX ORDER — LEVOTHYROXINE SODIUM 0.07 MG/1
75 TABLET ORAL DAILY
Qty: 90 TABLET | Refills: 1 | Status: SHIPPED | OUTPATIENT
Start: 2019-04-18 | End: 2019-10-31 | Stop reason: SDUPTHER

## 2019-04-18 RX ORDER — HYDROCHLOROTHIAZIDE 12.5 MG/1
12.5 TABLET ORAL DAILY PRN
Qty: 90 TABLET | Refills: 1 | Status: SHIPPED | OUTPATIENT
Start: 2019-04-18 | End: 2019-10-31 | Stop reason: SDUPTHER

## 2019-04-18 RX ORDER — LISINOPRIL 5 MG/1
5 TABLET ORAL DAILY
Qty: 90 TABLET | Refills: 1 | Status: SHIPPED | OUTPATIENT
Start: 2019-04-18 | End: 2019-10-31 | Stop reason: SDUPTHER

## 2019-04-18 RX ORDER — CHOLECALCIFEROL (VITAMIN D3) 1250 MCG
1 CAPSULE ORAL
Qty: 3 CAPSULE | Refills: 1 | Status: SHIPPED | OUTPATIENT
Start: 2019-04-18 | End: 2019-10-31 | Stop reason: SDUPTHER

## 2019-04-18 RX ORDER — MELOXICAM 15 MG/1
15 TABLET ORAL DAILY
Qty: 90 TABLET | Refills: 1 | Status: SHIPPED | OUTPATIENT
Start: 2019-04-18 | End: 2019-10-16 | Stop reason: ALTCHOICE

## 2019-04-19 LAB — HBA1C MFR BLD HPLC: 5.9 %

## 2019-10-16 ENCOUNTER — DOCUMENTATION (OUTPATIENT)
Dept: FAMILY MEDICINE CLINIC | Facility: CLINIC | Age: 59
End: 2019-10-16

## 2019-10-16 RX ORDER — DICLOFENAC SODIUM 75 MG/1
75 TABLET, DELAYED RELEASE ORAL 2 TIMES DAILY
COMMUNITY
End: 2022-02-07

## 2019-10-31 ENCOUNTER — OFFICE VISIT (OUTPATIENT)
Dept: FAMILY MEDICINE CLINIC | Facility: CLINIC | Age: 59
End: 2019-10-31
Payer: COMMERCIAL

## 2019-10-31 VITALS
RESPIRATION RATE: 18 BRPM | TEMPERATURE: 98 F | SYSTOLIC BLOOD PRESSURE: 102 MMHG | WEIGHT: 167 LBS | OXYGEN SATURATION: 98 % | HEIGHT: 63 IN | BODY MASS INDEX: 29.59 KG/M2 | DIASTOLIC BLOOD PRESSURE: 54 MMHG | HEART RATE: 70 BPM

## 2019-10-31 DIAGNOSIS — L40.9 PSORIASIS: ICD-10-CM

## 2019-10-31 DIAGNOSIS — L40.50 PSORIATIC ARTHRITIS (HCC): ICD-10-CM

## 2019-10-31 DIAGNOSIS — Z12.11 SCREEN FOR COLON CANCER: ICD-10-CM

## 2019-10-31 DIAGNOSIS — E03.9 ACQUIRED HYPOTHYROIDISM: ICD-10-CM

## 2019-10-31 DIAGNOSIS — Z13.29 SCREENING FOR THYROID DISORDER: ICD-10-CM

## 2019-10-31 DIAGNOSIS — E53.8 VITAMIN B12 DEFICIENCY: ICD-10-CM

## 2019-10-31 DIAGNOSIS — Z13.220 SCREENING FOR HYPERLIPIDEMIA: ICD-10-CM

## 2019-10-31 DIAGNOSIS — Z09 FOLLOW UP: Primary | ICD-10-CM

## 2019-10-31 DIAGNOSIS — L40.50 PSORIASIS WITH ARTHROPATHY (HCC): ICD-10-CM

## 2019-10-31 DIAGNOSIS — I10 ESSENTIAL HYPERTENSION: ICD-10-CM

## 2019-10-31 DIAGNOSIS — R60.0 LOCALIZED EDEMA: ICD-10-CM

## 2019-10-31 DIAGNOSIS — R21 RASH: ICD-10-CM

## 2019-10-31 DIAGNOSIS — J30.89 SEASONAL ALLERGIC RHINITIS DUE TO OTHER ALLERGIC TRIGGER: ICD-10-CM

## 2019-10-31 DIAGNOSIS — E55.9 VITAMIN D DEFICIENCY: ICD-10-CM

## 2019-10-31 DIAGNOSIS — E88.81 INSULIN RESISTANCE: ICD-10-CM

## 2019-10-31 PROCEDURE — 99214 OFFICE O/P EST MOD 30 MIN: CPT | Performed by: NURSE PRACTITIONER

## 2019-10-31 PROCEDURE — 3008F BODY MASS INDEX DOCD: CPT | Performed by: NURSE PRACTITIONER

## 2019-10-31 PROCEDURE — 3074F SYST BP LT 130 MM HG: CPT | Performed by: NURSE PRACTITIONER

## 2019-10-31 PROCEDURE — 3078F DIAST BP <80 MM HG: CPT | Performed by: NURSE PRACTITIONER

## 2019-10-31 RX ORDER — HYDROCHLOROTHIAZIDE 12.5 MG/1
12.5 TABLET ORAL DAILY PRN
Qty: 90 TABLET | Refills: 1 | Status: SHIPPED | OUTPATIENT
Start: 2019-10-31 | End: 2020-04-28

## 2019-10-31 RX ORDER — LISINOPRIL 5 MG/1
5 TABLET ORAL DAILY
Qty: 90 TABLET | Refills: 1 | Status: SHIPPED | OUTPATIENT
Start: 2019-10-31 | End: 2020-10-06

## 2019-10-31 RX ORDER — FEXOFENADINE HYDROCHLORIDE 60 MG/1
60 TABLET, FILM COATED ORAL DAILY PRN
Qty: 90 TABLET | Refills: 1 | Status: SHIPPED | OUTPATIENT
Start: 2019-10-31 | End: 2020-09-15

## 2019-10-31 RX ORDER — MULTIVIT WITH MINERALS/LUTEIN
1000 TABLET ORAL DAILY
Qty: 90 TABLET | Refills: 1 | Status: SHIPPED | OUTPATIENT
Start: 2019-10-31

## 2019-10-31 RX ORDER — LANOLIN ALCOHOL/MO/W.PET/CERES
1 CREAM (GRAM) TOPICAL 3 TIMES DAILY
Qty: 60 TABLET | Refills: 0 | Status: SHIPPED | OUTPATIENT
Start: 2019-10-31 | End: 2022-02-07

## 2019-10-31 RX ORDER — DICLOFENAC SODIUM 75 MG/1
75 TABLET, DELAYED RELEASE ORAL 2 TIMES DAILY
COMMUNITY
End: 2019-10-31 | Stop reason: SDUPTHER

## 2019-10-31 RX ORDER — METOPROLOL SUCCINATE 50 MG/1
50 TABLET, EXTENDED RELEASE ORAL DAILY
Qty: 90 TABLET | Refills: 1 | Status: SHIPPED | OUTPATIENT
Start: 2019-10-31 | End: 2020-10-06

## 2019-10-31 RX ORDER — LEVOTHYROXINE SODIUM 0.07 MG/1
75 TABLET ORAL DAILY
Qty: 90 TABLET | Refills: 1 | Status: SHIPPED | OUTPATIENT
Start: 2019-10-31 | End: 2020-10-06

## 2019-10-31 RX ORDER — CHOLECALCIFEROL (VITAMIN D3) 1250 MCG
1 CAPSULE ORAL
Qty: 3 CAPSULE | Refills: 1 | Status: SHIPPED | OUTPATIENT
Start: 2019-10-31 | End: 2020-03-15

## 2019-10-31 RX ORDER — THIAMINE HCL 100 MG
1 TABLET ORAL EVERY OTHER DAY
Qty: 90 EACH | Refills: 1 | Status: SHIPPED | OUTPATIENT
Start: 2019-10-31 | End: 2021-02-03 | Stop reason: SDUPTHER

## 2019-10-31 RX ORDER — BETAMETHASONE DIPROPIONATE 0.5 MG/G
CREAM TOPICAL 2 TIMES DAILY
Qty: 30 G | Refills: 0 | Status: SHIPPED | OUTPATIENT
Start: 2019-10-31 | End: 2022-02-07 | Stop reason: ALTCHOICE

## 2019-10-31 NOTE — PATIENT INSTRUCTIONS
Wellness Visit for Adults   WHAT YOU NEED TO KNOW:   What is a wellness visit? A wellness visit is when you see your healthcare provider to get screened for health problems  You can also get advice on how to stay healthy  Write down your questions so you remember to ask them  Ask your healthcare provider how often you should have a wellness visit  What happens at a wellness visit? Your healthcare provider will ask about your health, and your family history of health problems  This includes high blood pressure, heart disease, and cancer  He or she will ask if you have symptoms that concern you, if you smoke, and about your mood  You may also be asked about your intake of medicines, supplements, food, and alcohol  Any of the following may be done:  · Your weight  will be checked  Your height may also be checked so your body mass index (BMI) can be calculated  Your BMI shows if you are at a healthy weight  · Your blood pressure  and heart rate will be checked  Your temperature may also be checked  · Blood and urine tests  may be done  Blood tests may be done to check your cholesterol levels  Abnormal cholesterol levels increase your risk for heart disease and stroke  You may also need a blood or urine test to check for diabetes if you are at increased risk  Urine tests may be done to look for signs of an infection or kidney disease  · A physical exam  includes checking your heartbeat and lungs with a stethoscope  Your healthcare provider may also check your skin to look for sun damage  · Screening tests  may be recommended  A screening test is done to check for diseases that may not cause symptoms  The screening tests you may need depend on your age, gender, family history, and lifestyle habits  For example, colorectal screening may be recommended if you are 48years old or older  What screening tests do I need if I am a woman? · A Pap smear  is used to screen for cervical cancer   Pap smears are usually done every 3 to 5 years depending on your age  You may need them more often if you have had abnormal Pap smear test results in the past  Ask your healthcare provider how often you should have a Pap smear  · A mammogram  is an x-ray of your breasts to screen for breast cancer  Experts recommend mammograms every 2 years starting at age 48 years  You may need a mammogram at age 52 years or younger if you have an increased risk for breast cancer  Talk to your healthcare provider about when you should start having mammograms and how often you need them  What vaccines might I need? · Get an influenza vaccine  every year  The influenza vaccine protects you from the flu  Several types of viruses cause the flu  The viruses change over time, so new vaccines are made each year  · Get a tetanus-diphtheria (Td) booster vaccine  every 10 years  This vaccine protects you against tetanus and diphtheria  Tetanus is a severe infection that may cause painful muscle spasms and lockjaw  Diphtheria is a severe bacterial infection that causes a thick covering in the back of your mouth and throat  · Get a human papillomavirus (HPV) vaccine  if you are female and aged 23 to 32 or male 23 to 24 and never received it  This vaccine protects you from HPV infection  HPV is the most common infection spread by sexual contact  HPV may also cause vaginal, penile, and anal cancers  · Get a pneumococcal vaccine  if you are aged 72 years or older  The pneumococcal vaccine is an injection given to protect you from pneumococcal disease  Pneumococcal disease is an infection caused by pneumococcal bacteria  The infection may cause pneumonia, meningitis, or an ear infection  · Get a shingles vaccine  if you are aged 61 or older, even if you have had shingles before  The shingles vaccine is an injection to protect you from the varicella-zoster virus  This is the same virus that causes chickenpox   Shingles is a painful rash that develops in people who had chickenpox or have been exposed to the virus  How can I eat healthy? My Plate is a model for planning healthy meals  It shows the types and amounts of foods that should go on your plate  Fruits and vegetables make up about half of your plate, and grains and protein make up the other half  A serving of dairy is included on the side of your plate  The amount of calories and serving sizes you need depends on your age, gender, weight, and height  Examples of healthy foods are listed below:  · Eat a variety of vegetables  such as dark green, red, and orange vegetables  You can also include canned vegetables low in sodium (salt) and frozen vegetables without added butter or sauces  · Eat a variety of fresh fruits , canned fruit in 100% juice, frozen fruit, and dried fruit  · Include whole grains  At least half of the grains you eat should be whole grains  Examples include whole-wheat bread, wheat pasta, brown rice, and whole-grain cereals such as oatmeal     · Eat a variety of protein foods such as seafood (fish and shellfish), lean meat, and poultry without skin (turkey and chicken)  Examples of lean meats include pork leg, shoulder, or tenderloin, and beef round, sirloin, tenderloin, and extra lean ground beef  Other protein foods include eggs and egg substitutes, beans, peas, soy products, nuts, and seeds  · Choose low-fat dairy products such as skim or 1% milk or low-fat yogurt, cheese, and cottage cheese  · Limit unhealthy fats  such as butter, hard margarine, and shortening  How much exercise do I need? Exercise at least 30 minutes per day on most days of the week  Some examples of exercise include walking, biking, dancing, and swimming  You can also fit in more physical activity by taking the stairs instead of the elevator or parking farther away from stores  Include muscle strengthening activities 2 days each week  Regular exercise provides many health benefits  It helps you manage your weight, and decreases your risk for type 2 diabetes, heart disease, stroke, and high blood pressure  Exercise can also help improve your mood  Ask your healthcare provider about the best exercise plan for you  What are some general health and safety guidelines I should follow? · Do not smoke  Nicotine and other chemicals in cigarettes and cigars can cause lung damage  Ask your healthcare provider for information if you currently smoke and need help to quit  E-cigarettes or smokeless tobacco still contain nicotine  Talk to your healthcare provider before you use these products  · Limit alcohol  A drink of alcohol is 12 ounces of beer, 5 ounces of wine, or 1½ ounces of liquor  · Lose weight, if needed  Being overweight increases your risk of certain health conditions  These include heart disease, high blood pressure, type 2 diabetes, and certain types of cancer  · Protect your skin  Do not sunbathe or use tanning beds  Use sunscreen with a SPF 15 or higher  Apply sunscreen at least 15 minutes before you go outside  Reapply sunscreen every 2 hours  Wear protective clothing, hats, and sunglasses when you are outside  · Drive safely  Always wear your seatbelt  Make sure everyone in your car wears a seatbelt  A seatbelt can save your life if you are in an accident  Do not use your cell phone when you are driving  This could distract you and cause an accident  Pull over if you need to make a call or send a text message  · Practice safe sex  Use latex condoms if are sexually active and have more than one partner  Your healthcare provider may recommend screening tests for sexually transmitted infections (STIs)  · Wear helmets, lifejackets, and protective gear  Always wear a helmet when you ride a bike or motorcycle, go skiing, or play sports that could cause a head injury  Wear protective equipment when you play sports   Wear a lifejacket when you are on a boat or doing water sports  CARE AGREEMENT:   You have the right to help plan your care  Learn about your health condition and how it may be treated  Discuss treatment options with your caregivers to decide what care you want to receive  You always have the right to refuse treatment  The above information is an  only  It is not intended as medical advice for individual conditions or treatments  Talk to your doctor, nurse or pharmacist before following any medical regimen to see if it is safe and effective for you  © 2017 [employer] Information is for End User's use only and may not be sold, redistributed or otherwise used for commercial purposes  All illustrations and images included in CareNotes® are the copyrighted property of A D A M [employer] Inc  or Baldev Newman

## 2019-10-31 NOTE — PROGRESS NOTES
Assessment/Plan:     Diagnoses and all orders for this visit:    Follow up    Psoriasis with arthropathy ([facility] [address] )  Comments: Followed by Rheumatology  Orders:  -     CBC and differential; Future  -     Vitamin D 1,25 dihydroxy; Future    Essential hypertension  -     metoprolol succinate (TOPROL-XL) 50 mg 24 hr tablet; Take 1 tablet (50 mg total) by mouth daily  -     lisinopril (ZESTRIL) 5 mg tablet; Take 1 tablet (5 mg total) by mouth daily    Acquired hypothyroidism  -     levothyroxine 75 mcg tablet; Take 1 tablet (75 mcg total) by mouth daily  -     TSH, 3rd generation; Future    Localized edema  -     hydrochlorothiazide (HYDRODIURIL) 12 5 mg tablet; Take 1 tablet (12 5 mg total) by mouth daily as needed (swelling)    Psoriatic arthritis (HCC)  -     glucosamine-chondroitin 500-400 MG tablet; Take 1 tablet by mouth 3 (three) times a day    Seasonal allergic rhinitis due to other allergic trigger  -     fexofenadine (ALLEGRA) 60 MG tablet; Take 1 tablet (60 mg total) by mouth daily as needed (allergies)    Vitamin D deficiency  -     Cholecalciferol (VITAMIN D3) 1 25 MG (62185 UT) CAPS; Take 1 capsule (50,000 Units total) by mouth every 30 (thirty) days  -     Vitamin D 1,25 dihydroxy; Future    Psoriasis  -     betamethasone dipropionate (DIPROSONE) 0 05 % cream; Apply topically 2 (two) times a day    Rash  -     betamethasone dipropionate (DIPROSONE) 0 05 % cream; Apply topically 2 (two) times a day    Vitamin B12 deficiency  -     Cyanocobalamin (VITAMIN B-12) 2500 MCG SUBL; Place 1 tablet (2,500 mcg total) under the tongue every other day  -     Ascorbic Acid (VITAMIN C) 1000 MG tablet; Take 1 tablet (1,000 mg total) by mouth daily  -     Vitamin B12; Future    Insulin resistance  -     Comprehensive metabolic panel; Future  -     Hemoglobin A1C; Future  -     Insulin, fasting; Future    Screening for thyroid disorder  -     TSH, 3rd generation;  Future    Screening for hyperlipidemia  -     Lipid panel; Future    Screen for colon cancer  -     Occult Blood, Fecal Immunochemical; Future    Other orders  -     Discontinue: diclofenac (VOLTAREN) 75 mg EC tablet; Take 75 mg by mouth 2 (two) times a day        Subjective:      Patient ID: Latia Medina is a 63 y o  female  Patient presents to [address] [place] [address] as follow up  Allergies, medical history and current medications reviewed with patient; patient reports taking all medications as prescribed without issues, and is requesting refills on all medications  Patient continues to follow with Rheumatology Dr Cobos, and states she was switched from Mobic to Diclofenac, and will potentially be started on Methotrexate  Patient states several labs were completed through Rheumatology; results currently unavailable  Patient states her next appointment with Rheumatology is scheduled for February 2020  Patient denies any current problems or complaints at this time  Patient Care Team:  Tonia Chavarria as PCP - General (Family Medicine)  Jeffy Richardson MD (Rheumatology)    Review of Systems   Respiratory: Negative for shortness of breath  Cardiovascular: Negative for chest pain  Musculoskeletal: Positive for arthralgias  All other systems reviewed and are negative  Objective:    /54 (BP Location: Right arm, Patient Position: Sitting, Cuff Size: Large)   Pulse 70   Temp 98 °F (36 7 °C) (Temporal)   Resp 18   Ht 5' 3" (1 6 m)   Wt 75 8 kg (167 lb)   SpO2 98%   BMI 29 58 kg/m²      Physical Exam   Constitutional: She is oriented to person, place, and time  She appears well-developed and well-nourished  No distress  HENT:   Head: Normocephalic and atraumatic  Eyes: Conjunctivae and lids are normal    Neck: Normal range of motion  No tracheal deviation present  Cardiovascular: Normal rate, regular rhythm, S1 normal, S2 normal and normal heart sounds  No murmur heard    Pulmonary/Chest: Effort normal and breath sounds normal  No respiratory distress  Abdominal: Normal appearance  She exhibits no distension  There is no guarding  Musculoskeletal: Normal range of motion  Neurological: She is alert and oriented to person, place, and time  Skin: Skin is warm, dry and intact  Psychiatric: She has a normal mood and affect  Her speech is normal    Nursing note and vitals reviewed  BMI Counseling: Body mass index is 29 58 kg/m²  The BMI is above normal  Nutrition recommendations include 3-5 servings of fruits/vegetables daily, consuming healthier snacks, reducing intake of saturated fat and trans fat and reducing intake of cholesterol  Exercise recommendations include exercising 3-5 times per week  The above recommendations were included in patient instructions

## 2019-11-14 ENCOUNTER — TELEPHONE (OUTPATIENT)
Dept: FAMILY MEDICINE CLINIC | Facility: CLINIC | Age: 59
End: 2019-11-14

## 2019-11-14 DIAGNOSIS — J01.00 ACUTE NON-RECURRENT MAXILLARY SINUSITIS: Primary | ICD-10-CM

## 2019-11-14 RX ORDER — AMOXICILLIN AND CLAVULANATE POTASSIUM 875; 125 MG/1; MG/1
1 TABLET, FILM COATED ORAL 2 TIMES DAILY
Qty: 20 TABLET | Refills: 1 | Status: SHIPPED | OUTPATIENT
Start: 2019-11-14 | End: 2019-11-24

## 2019-11-14 NOTE — TELEPHONE ENCOUNTER
Notify patient I escribed Rx Augmentin 875mg/125mg si tab po 2 x daily x 10 days #20 #1ref to [facility]

## 2020-03-13 ENCOUNTER — TELEPHONE (OUTPATIENT)
Dept: FAMILY MEDICINE CLINIC | Facility: CLINIC | Age: 60
End: 2020-03-13

## 2020-03-15 DIAGNOSIS — E55.9 VITAMIN D DEFICIENCY: ICD-10-CM

## 2020-03-15 RX ORDER — METHOCARBAMOL 750 MG/1
TABLET ORAL
Qty: 12 CAPSULE | Refills: 1 | Status: SHIPPED | OUTPATIENT
Start: 2020-03-15 | End: 2020-03-25

## 2020-03-18 ENCOUNTER — TELEPHONE (OUTPATIENT)
Dept: FAMILY MEDICINE CLINIC | Facility: CLINIC | Age: 60
End: 2020-03-18

## 2020-03-18 DIAGNOSIS — J01.00 ACUTE NON-RECURRENT MAXILLARY SINUSITIS: Primary | ICD-10-CM

## 2020-03-18 DIAGNOSIS — J30.89 SEASONAL ALLERGIC RHINITIS DUE TO OTHER ALLERGIC TRIGGER: ICD-10-CM

## 2020-03-18 RX ORDER — AMOXICILLIN AND CLAVULANATE POTASSIUM 875; 125 MG/1; MG/1
1 TABLET, FILM COATED ORAL 2 TIMES DAILY
Qty: 20 TABLET | Refills: 1 | Status: SHIPPED | OUTPATIENT
Start: 2020-03-18 | End: 2020-03-28

## 2020-03-18 RX ORDER — FLUTICASONE PROPIONATE 50 MCG
SPRAY, SUSPENSION (ML) NASAL
Qty: 1 BOTTLE | Refills: 5 | Status: SHIPPED | OUTPATIENT
Start: 2020-03-18 | End: 2022-08-08 | Stop reason: SDUPTHER

## 2020-03-18 NOTE — TELEPHONE ENCOUNTER
Please notify patient I escribed a Rx Augmentin 875mg/125mg si tab po 2 x daily x 10 days #20 #1ref and a refill on her Flonase NS 50mcg/spray si spray each nostril 2 x daily x 1 month then prn congestion #1 bottle #5ref escribed to [facility]

## 2020-03-25 DIAGNOSIS — E55.9 VITAMIN D DEFICIENCY: ICD-10-CM

## 2020-03-25 RX ORDER — METHOCARBAMOL 750 MG/1
TABLET ORAL
Qty: 3 CAPSULE | Refills: 0 | Status: SHIPPED | OUTPATIENT
Start: 2020-03-25 | End: 2021-06-22 | Stop reason: SDUPTHER

## 2020-04-27 DIAGNOSIS — R60.0 LOCALIZED EDEMA: ICD-10-CM

## 2020-04-28 RX ORDER — HYDROCHLOROTHIAZIDE 12.5 MG/1
TABLET ORAL
Qty: 90 TABLET | Refills: 0 | Status: SHIPPED | OUTPATIENT
Start: 2020-04-28 | End: 2020-05-04 | Stop reason: SDUPTHER

## 2020-05-04 ENCOUNTER — TELEMEDICINE (OUTPATIENT)
Dept: FAMILY MEDICINE CLINIC | Facility: CLINIC | Age: 60
End: 2020-05-04
Payer: COMMERCIAL

## 2020-05-04 DIAGNOSIS — J30.89 SEASONAL ALLERGIC RHINITIS DUE TO OTHER ALLERGIC TRIGGER: ICD-10-CM

## 2020-05-04 DIAGNOSIS — I10 ESSENTIAL HYPERTENSION: ICD-10-CM

## 2020-05-04 DIAGNOSIS — E88.81 INSULIN RESISTANCE: ICD-10-CM

## 2020-05-04 DIAGNOSIS — E03.9 ACQUIRED HYPOTHYROIDISM: ICD-10-CM

## 2020-05-04 DIAGNOSIS — L40.50 PSORIASIS WITH ARTHROPATHY (HCC): Primary | ICD-10-CM

## 2020-05-04 DIAGNOSIS — M25.50 POLYARTHRALGIA: ICD-10-CM

## 2020-05-04 DIAGNOSIS — M35.3 POLYMYALGIA (HCC): ICD-10-CM

## 2020-05-04 DIAGNOSIS — E53.8 VITAMIN B12 DEFICIENCY: ICD-10-CM

## 2020-05-04 DIAGNOSIS — Z13.220 SCREENING FOR HYPERLIPIDEMIA: ICD-10-CM

## 2020-05-04 DIAGNOSIS — E55.9 VITAMIN D DEFICIENCY: ICD-10-CM

## 2020-05-04 DIAGNOSIS — R60.0 LOCALIZED EDEMA: ICD-10-CM

## 2020-05-04 PROCEDURE — 99214 OFFICE O/P EST MOD 30 MIN: CPT | Performed by: NURSE PRACTITIONER

## 2020-05-04 RX ORDER — FOLIC ACID 1 MG/1
1 TABLET ORAL DAILY
COMMUNITY
End: 2022-08-08

## 2020-05-04 RX ORDER — HYDROCHLOROTHIAZIDE 12.5 MG/1
12.5 TABLET ORAL DAILY
Qty: 90 TABLET | Refills: 1 | Status: SHIPPED | OUTPATIENT
Start: 2020-05-04 | End: 2021-02-03 | Stop reason: SDUPTHER

## 2020-07-31 LAB — HBA1C MFR BLD HPLC: 5.8 %

## 2020-09-11 DIAGNOSIS — J30.89 SEASONAL ALLERGIC RHINITIS DUE TO OTHER ALLERGIC TRIGGER: ICD-10-CM

## 2020-09-15 RX ORDER — FEXOFENADINE HYDROCHLORIDE 60 MG/1
TABLET, FILM COATED ORAL
Qty: 90 TABLET | Refills: 0 | Status: SHIPPED | OUTPATIENT
Start: 2020-09-15 | End: 2022-08-08 | Stop reason: ALTCHOICE

## 2020-10-05 DIAGNOSIS — I10 ESSENTIAL HYPERTENSION: ICD-10-CM

## 2020-10-05 DIAGNOSIS — E03.9 ACQUIRED HYPOTHYROIDISM: ICD-10-CM

## 2020-10-06 RX ORDER — METOPROLOL SUCCINATE 50 MG/1
TABLET, EXTENDED RELEASE ORAL
Qty: 90 TABLET | Refills: 0 | Status: SHIPPED | OUTPATIENT
Start: 2020-10-06 | End: 2020-12-30

## 2020-10-06 RX ORDER — LEVOTHYROXINE SODIUM 0.07 MG/1
TABLET ORAL
Qty: 90 TABLET | Refills: 0 | Status: SHIPPED | OUTPATIENT
Start: 2020-10-06 | End: 2020-12-30

## 2020-10-06 RX ORDER — LISINOPRIL 5 MG/1
TABLET ORAL
Qty: 90 TABLET | Refills: 0 | Status: SHIPPED | OUTPATIENT
Start: 2020-10-06 | End: 2020-12-30

## 2020-12-03 ENCOUNTER — TELEMEDICINE (OUTPATIENT)
Dept: FAMILY MEDICINE CLINIC | Facility: CLINIC | Age: 60
End: 2020-12-03
Payer: COMMERCIAL

## 2020-12-03 DIAGNOSIS — R05.9 COUGH: ICD-10-CM

## 2020-12-03 DIAGNOSIS — Z20.822 EXPOSURE TO COVID-19 VIRUS: Primary | ICD-10-CM

## 2020-12-03 PROCEDURE — 99214 OFFICE O/P EST MOD 30 MIN: CPT | Performed by: NURSE PRACTITIONER

## 2020-12-03 RX ORDER — SOLIFENACIN SUCCINATE 5 MG/1
TABLET, FILM COATED ORAL
COMMUNITY
Start: 2017-04-20 | End: 2022-02-07 | Stop reason: ALTCHOICE

## 2020-12-03 RX ORDER — BENZONATATE 100 MG/1
100 CAPSULE ORAL 3 TIMES DAILY PRN
Qty: 20 CAPSULE | Refills: 0 | Status: SHIPPED | OUTPATIENT
Start: 2020-12-03 | End: 2021-01-05 | Stop reason: SDUPTHER

## 2020-12-03 RX ORDER — BENZONATATE 100 MG/1
100 CAPSULE ORAL 3 TIMES DAILY PRN
Qty: 20 CAPSULE | Refills: 0 | Status: SHIPPED | OUTPATIENT
Start: 2020-12-03 | End: 2020-12-03

## 2020-12-03 NOTE — LETTER
December 3, 2020     Patient: Justin Guo   YOB: 1960   Date of Visit: 12/3/2020       To Whom it May Concern:    Ander Reynoso is under my professional care  She was evaluated by my office on 12/3/2020  She is being treated for Psoriatic Arthritis, which subsequently causes immunosuppression  Due to the current Covid-19 Pandemic, she should be permitted to work from home until further notice due to being at an increased risk of severe infection  If you have any questions or concerns, please don't hesitate to call        Sincerely,            ROBBI Maldonado        CC: No Recipients

## 2020-12-03 NOTE — PROGRESS NOTES
COVID-19 Virtual Visit     Assessment/Plan:    Problem List Items Addressed This Visit     None      Visit Diagnoses     Exposure to COVID-19 virus    -  Primary    Relevant Orders    Novel Coronavirus (COVID-19), PCR [facility] Collected at [facility] or [facility]    Cough        Relevant Medications    benzonatate (TESSALON PERLES) 100 mg capsule         Disposition:     I referred patient to one of our centralized sites for a COVID-19 swab  I have spent 20 minutes directly with the patient  Greater than 50% of this time was spent in counseling/coordination of care regarding: impressions  Encounter provider ROBBI Suggs    Provider located at [address] [place] [address] [address]    Recent Visits  No visits were found meeting these conditions  Showing recent visits within past 7 days and meeting all other requirements     Today's Visits  Date Type Provider Dept   12/03/20 Telemedicine ROBBI Maldonado  Kathleen    Showing today's visits and meeting all other requirements     Future Appointments  No visits were found meeting these conditions  Showing future appointments within next 150 days and meeting all other requirements          Subjective:   John Ritter is a 62 y o  female who is concerned about COVID-19  Patient's symptoms include rhinorrhea, sore throat and headache  Patient c/o sore throat, sinus pressure, headache, runny nose, ongoing since last evening  Patient reports several coworkers had tested positive for Covid-19  Patient also reports her  was sick on Monday with vomiting and headaches  Patient also reports her son recently tested positive, who she was last in contact with over the weekend  Patient states she had spoken with her Rheumatologist, who recommended holding Methotrexate        No results found for: Yaya Chery  Past Medical History:   Diagnosis Date    Allergic     Asthma     Bilateral ovarian cysts     Disease of thyroid gland     HYPOTHYROID    Eczema     Familial cavernous cerebral angioma (HCC)     Fibromyalgia     Hypertension     Insomnia     MI (myocardial infarction) (HCC)     Migraines     Osteoarthritis     Polyarthropathy     Polymyalgia (HCC)     Psoriasis     TIA (transient ischemic attack)     Vitamin deficiency      Past Surgical History:   Procedure Laterality Date    DILATION AND CURETTAGE OF UTERUS      X 2    ENDOMETRIAL ABLATION W/ NOVASURE Bilateral 2010     Current Outpatient Medications   Medication Sig Dispense Refill    solifenacin (VESIcare) 5 mg tablet Take by mouth      Ascorbic Acid (VITAMIN C) 1000 MG tablet Take 1 tablet (1,000 mg total) by mouth daily 90 tablet 1    benzonatate (TESSALON PERLES) 100 mg capsule Take 1 capsule (100 mg total) by mouth 3 (three) times a day as needed for cough 20 capsule 0    betamethasone dipropionate (DIPROSONE) 0 05 % cream Apply topically 2 (two) times a day 30 g 0    calcipotriene (DOVONOX) 0 005 % ointment Apply topically 2 (two) times a day 60 g 5    Cyanocobalamin (VITAMIN B-12) 2500 MCG SUBL Place 1 tablet (2,500 mcg total) under the tongue every other day 90 each 1    D3-50 1 25 MG (03538 UT) capsule TAKE 1 CAPSULE (14653 UNITS) BY MOUTH EVERY 30 DAYS 3 capsule 0    diclofenac (VOLTAREN) 75 mg EC tablet Take 75 mg by mouth 2 (two) times a day      fexofenadine (ALLEGRA) 60 MG tablet TAKE 1 TABLET BY MOUTH ONCE DAILY AS NEEDED (ALLERGIES) 90 tablet 0    fluticasone (FLONASE) 50 mcg/act nasal spray 1 spray each nostril 2 x daily x 1 month then prn congestion or allergies 1 Bottle 5    folic acid (FOLVITE) 1 mg tablet Take 1 mg by mouth daily      glucosamine-chondroitin 500-400 MG tablet Take 1 tablet by mouth 3 (three) times a day 60 tablet 0    hydrochlorothiazide (HYDRODIURIL) 12 5 mg tablet Take 1 tablet (12 5 mg total) by mouth daily 90 tablet 1    levothyroxine 75 mcg tablet Take 1 tablet by mouth daily 90 tablet 0    lisinopril (ZESTRIL) 5 mg tablet Take 1 tablet by mouth daily 90 tablet 0    methotrexate 2 5 mg tablet Take by mouth once a week 6 tabs po once weekly on Friday      metoprolol succinate (TOPROL-XL) 50 mg 24 hr tablet Take 1 tablet by mouth daily 90 tablet 0     No current facility-administered medications for this visit  No Known Allergies    Review of Systems   HENT: Positive for rhinorrhea and sore throat  Neurological: Positive for headaches  Objective: There were no vitals filed for this visit  Physical Exam  Constitutional:       General: She is not in acute distress  Appearance: She is well-developed  HENT:      Head: Normocephalic and atraumatic  Pulmonary:      Effort: Pulmonary effort is normal  No respiratory distress  Neurological:      Mental Status: She is alert and oriented to person, place, and time  Psychiatric:         Speech: Speech normal        VIRTUAL VISIT DISCLAIMER    Rony Barakat acknowledges that she has consented to an online visit or consultation  She understands that the online visit is based solely on information provided by her, and that, in the absence of a face-to-face physical evaluation by the physician, the diagnosis she receives is both limited and provisional in terms of accuracy and completeness  This is not intended to replace a full medical face-to-face evaluation by the physician  Rony Barakat understands and accepts these terms

## 2020-12-18 ENCOUNTER — TELEPHONE (OUTPATIENT)
Dept: FAMILY MEDICINE CLINIC | Facility: CLINIC | Age: 60
End: 2020-12-18

## 2020-12-18 DIAGNOSIS — J32.9 SINUSITIS, UNSPECIFIED CHRONICITY, UNSPECIFIED LOCATION: Primary | ICD-10-CM

## 2020-12-18 RX ORDER — AMOXICILLIN 500 MG/1
500 CAPSULE ORAL EVERY 8 HOURS SCHEDULED
Qty: 21 CAPSULE | Refills: 0 | Status: SHIPPED | OUTPATIENT
Start: 2020-12-18 | End: 2020-12-25

## 2020-12-29 DIAGNOSIS — I10 ESSENTIAL HYPERTENSION: ICD-10-CM

## 2020-12-29 DIAGNOSIS — E03.9 ACQUIRED HYPOTHYROIDISM: ICD-10-CM

## 2020-12-30 ENCOUNTER — VBI (OUTPATIENT)
Dept: ADMINISTRATIVE | Facility: OTHER | Age: 60
End: 2020-12-30

## 2020-12-30 RX ORDER — LEVOTHYROXINE SODIUM 0.07 MG/1
TABLET ORAL
Qty: 90 TABLET | Refills: 0 | Status: SHIPPED | OUTPATIENT
Start: 2020-12-30 | End: 2021-04-06

## 2020-12-30 RX ORDER — METOPROLOL SUCCINATE 50 MG/1
TABLET, EXTENDED RELEASE ORAL
Qty: 90 TABLET | Refills: 0 | Status: SHIPPED | OUTPATIENT
Start: 2020-12-30 | End: 2021-04-06

## 2020-12-30 RX ORDER — LISINOPRIL 5 MG/1
TABLET ORAL
Qty: 90 TABLET | Refills: 0 | Status: SHIPPED | OUTPATIENT
Start: 2020-12-30 | End: 2021-04-06

## 2021-01-05 ENCOUNTER — TELEMEDICINE (OUTPATIENT)
Dept: FAMILY MEDICINE CLINIC | Facility: CLINIC | Age: 61
End: 2021-01-05
Payer: COMMERCIAL

## 2021-01-05 DIAGNOSIS — Z86.16 HISTORY OF 2019 NOVEL CORONAVIRUS DISEASE (COVID-19): Primary | ICD-10-CM

## 2021-01-05 DIAGNOSIS — R05.9 COUGH: ICD-10-CM

## 2021-01-05 PROCEDURE — 99213 OFFICE O/P EST LOW 20 MIN: CPT | Performed by: NURSE PRACTITIONER

## 2021-01-05 PROCEDURE — 1036F TOBACCO NON-USER: CPT | Performed by: NURSE PRACTITIONER

## 2021-01-05 RX ORDER — BENZONATATE 100 MG/1
100 CAPSULE ORAL 3 TIMES DAILY PRN
Qty: 20 CAPSULE | Refills: 0 | Status: SHIPPED | OUTPATIENT
Start: 2021-01-05 | End: 2021-06-22

## 2021-01-05 NOTE — PROGRESS NOTES
COVID-19 Virtual Visit     Assessment/Plan:    Problem List Items Addressed This Visit        Other    History of 2019 novel coronavirus disease (COVID-19) - Primary      Other Visit Diagnoses     Cough        Relevant Medications    benzonatate (TESSALON PERLES) 100 mg capsule         Disposition:     I have spent 20 minutes directly with the patient  Greater than 50% of this time was spent in counseling/coordination of care regarding: prognosis, instructions for management and impressions  Encounter provider ROBBI Henry    Provider located at [address] [place] [address]    Recent Visits  No visits were found meeting these conditions  Showing recent visits within past 7 days and meeting all other requirements     Today's Visits  Date Type Provider Dept   01/05/21 Telemedicine ROBBI Maldonado [facility]   Showing today's visits and meeting all other requirements     Future Appointments  No visits were found meeting these conditions  Showing future appointments within next 150 days and meeting all other requirements      This virtual check-in was done via [employer] and patient was informed that this is not a secure, HIPAA-compliant platform  She agrees to proceed  Patient agrees to participate in a virtual check in via telephone or video visit instead of presenting to the office to address urgent/immediate medical needs  Patient is aware this is a billable service  After connecting through [facility], the patient was identified by name and date of birth  Jennifer Conner was informed that this was a telemedicine visit and that the exam was being conducted confidentially over secure lines  My office door was closed  No one else was in the room  Jennifer Conner acknowledged consent and understanding of privacy and security of the telemedicine visit   I informed the patient that I have reviewed her record in Epic and presented the opportunity for her to ask any questions regarding the visit today  The patient agreed to participate  Subjective:   Jose Fields is a 62 y o  female who has been screened for COVID-19  Symptom change since last report: improving  Date of symptom onset: 12/2/2020    Patient's symptoms include rhinorrhea, sore throat, cough and chest tightness       Lab Results   Component Value Date    SARSCORONAVI Detected (A) 12/04/2020     Past Medical History:   Diagnosis Date    Allergic     Asthma     Bilateral ovarian cysts     Disease of thyroid gland     HYPOTHYROID    Eczema     Familial cavernous cerebral angioma (HCC)     Fibromyalgia     Hypertension     Insomnia     MI (myocardial infarction) ([address] )     Migraines     Osteoarthritis     Polyarthropathy     Polymyalgia (HCC)     Psoriasis     TIA (transient ischemic attack)     Vitamin deficiency      Past Surgical History:   Procedure Laterality Date    DILATION AND CURETTAGE OF UTERUS      X 2    ENDOMETRIAL ABLATION W/ NOVASURE Bilateral 2010     Current Outpatient Medications   Medication Sig Dispense Refill    Ascorbic Acid (VITAMIN C) 1000 MG tablet Take 1 tablet (1,000 mg total) by mouth daily 90 tablet 1    benzonatate (TESSALON PERLES) 100 mg capsule Take 1 capsule (100 mg total) by mouth 3 (three) times a day as needed for cough 20 capsule 0    betamethasone dipropionate (DIPROSONE) 0 05 % cream Apply topically 2 (two) times a day 30 g 0    calcipotriene (DOVONOX) 0 005 % ointment Apply topically 2 (two) times a day 60 g 5    Cyanocobalamin (VITAMIN B-12) 2500 MCG SUBL Place 1 tablet (2,500 mcg total) under the tongue every other day 90 each 1    D3-50 1 25 MG (96707 UT) capsule TAKE 1 CAPSULE (93374 UNITS) BY MOUTH EVERY 30 DAYS 3 capsule 0    diclofenac (VOLTAREN) 75 mg EC tablet Take 75 mg by mouth 2 (two) times a day      fexofenadine (ALLEGRA) 60 MG tablet TAKE 1 TABLET BY MOUTH ONCE DAILY AS NEEDED (ALLERGIES) 90 tablet 0  fluticasone (FLONASE) 50 mcg/act nasal spray 1 spray each nostril 2 x daily x 1 month then prn congestion or allergies 1 Bottle 5    folic acid (FOLVITE) 1 mg tablet Take 1 mg by mouth daily      glucosamine-chondroitin 500-400 MG tablet Take 1 tablet by mouth 3 (three) times a day 60 tablet 0    hydrochlorothiazide (HYDRODIURIL) 12 5 mg tablet Take 1 tablet (12 5 mg total) by mouth daily 90 tablet 1    levothyroxine 75 mcg tablet Take 1 tablet by mouth once daily 90 tablet 0    lisinopril (ZESTRIL) 5 mg tablet Take 1 tablet by mouth once daily 90 tablet 0    methotrexate 2 5 mg tablet Take by mouth once a week 6 tabs po once weekly on Friday      metoprolol succinate (TOPROL-XL) 50 mg 24 hr tablet Take 1 tablet by mouth once daily 90 tablet 0    solifenacin (VESIcare) 5 mg tablet Take by mouth       No current facility-administered medications for this visit  No Known Allergies    Review of Systems   HENT: Positive for rhinorrhea and sore throat  Respiratory: Positive for cough and chest tightness  Objective: There were no vitals filed for this visit  Physical Exam  Constitutional:       General: She is not in acute distress  Appearance: She is well-developed  HENT:      Head: Normocephalic and atraumatic  Pulmonary:      Effort: Pulmonary effort is normal  No respiratory distress  Neurological:      Mental Status: She is alert and oriented to person, place, and time  Psychiatric:         Speech: Speech normal        VIRTUAL VISIT DISCLAIMER    Rony Barakat acknowledges that she has consented to an online visit or consultation  She understands that the online visit is based solely on information provided by her, and that, in the absence of a face-to-face physical evaluation by the physician, the diagnosis she receives is both limited and provisional in terms of accuracy and completeness   This is not intended to replace a full medical face-to-face evaluation by the physician  Delano Kruger understands and accepts these terms

## 2021-01-07 ENCOUNTER — TELEPHONE (OUTPATIENT)
Dept: FAMILY MEDICINE CLINIC | Facility: CLINIC | Age: 61
End: 2021-01-07

## 2021-01-07 DIAGNOSIS — Z86.16 HISTORY OF 2019 NOVEL CORONAVIRUS DISEASE (COVID-19): Primary | ICD-10-CM

## 2021-01-07 DIAGNOSIS — R06.2 WHEEZING: ICD-10-CM

## 2021-01-07 RX ORDER — ALBUTEROL SULFATE 90 UG/1
2 AEROSOL, METERED RESPIRATORY (INHALATION) EVERY 6 HOURS PRN
Qty: 1 INHALER | Refills: 5 | Status: SHIPPED | OUTPATIENT
Start: 2021-01-07 | End: 2021-06-22 | Stop reason: SDUPTHER

## 2021-02-03 DIAGNOSIS — R60.0 LOCALIZED EDEMA: ICD-10-CM

## 2021-02-03 DIAGNOSIS — E53.8 VITAMIN B12 DEFICIENCY: ICD-10-CM

## 2021-02-03 RX ORDER — HYDROCHLOROTHIAZIDE 12.5 MG/1
12.5 TABLET ORAL DAILY
Qty: 90 TABLET | Refills: 1 | Status: SHIPPED | OUTPATIENT
Start: 2021-02-03 | End: 2021-06-22

## 2021-02-03 RX ORDER — THIAMINE HCL 100 MG
1 TABLET ORAL EVERY OTHER DAY
Qty: 90 EACH | Refills: 1 | Status: SHIPPED | OUTPATIENT
Start: 2021-02-03

## 2021-04-06 DIAGNOSIS — E03.9 ACQUIRED HYPOTHYROIDISM: ICD-10-CM

## 2021-04-06 DIAGNOSIS — I10 ESSENTIAL HYPERTENSION: ICD-10-CM

## 2021-04-06 RX ORDER — METOPROLOL SUCCINATE 50 MG/1
TABLET, EXTENDED RELEASE ORAL
Qty: 90 TABLET | Refills: 0 | Status: SHIPPED | OUTPATIENT
Start: 2021-04-06 | End: 2021-06-22 | Stop reason: SDUPTHER

## 2021-04-06 RX ORDER — LISINOPRIL 5 MG/1
TABLET ORAL
Qty: 90 TABLET | Refills: 0 | Status: SHIPPED | OUTPATIENT
Start: 2021-04-06 | End: 2021-06-22

## 2021-04-06 RX ORDER — LEVOTHYROXINE SODIUM 0.07 MG/1
TABLET ORAL
Qty: 90 TABLET | Refills: 0 | Status: SHIPPED | OUTPATIENT
Start: 2021-04-06 | End: 2021-06-22 | Stop reason: SDUPTHER

## 2021-05-07 ENCOUNTER — VBI (OUTPATIENT)
Dept: ADMINISTRATIVE | Facility: OTHER | Age: 61
End: 2021-05-07

## 2021-06-21 RX ORDER — OXYCODONE HYDROCHLORIDE AND ACETAMINOPHEN 5; 325 MG/1; MG/1
TABLET ORAL
COMMUNITY
Start: 2021-04-12 | End: 2021-06-22

## 2021-06-21 RX ORDER — AZITHROMYCIN 250 MG/1
TABLET, FILM COATED ORAL
COMMUNITY
Start: 2021-05-05 | End: 2021-06-22

## 2021-06-22 ENCOUNTER — OFFICE VISIT (OUTPATIENT)
Dept: FAMILY MEDICINE CLINIC | Facility: CLINIC | Age: 61
End: 2021-06-22
Payer: COMMERCIAL

## 2021-06-22 VITALS
HEART RATE: 91 BPM | BODY MASS INDEX: 33.66 KG/M2 | OXYGEN SATURATION: 98 % | HEIGHT: 63 IN | WEIGHT: 190 LBS | TEMPERATURE: 98.7 F | DIASTOLIC BLOOD PRESSURE: 78 MMHG | SYSTOLIC BLOOD PRESSURE: 142 MMHG

## 2021-06-22 DIAGNOSIS — Z12.31 ENCOUNTER FOR SCREENING MAMMOGRAM FOR BREAST CANCER: Primary | ICD-10-CM

## 2021-06-22 DIAGNOSIS — Z12.11 SCREENING FOR COLORECTAL CANCER: ICD-10-CM

## 2021-06-22 DIAGNOSIS — R06.2 WHEEZING: ICD-10-CM

## 2021-06-22 DIAGNOSIS — E03.9 ACQUIRED HYPOTHYROIDISM: ICD-10-CM

## 2021-06-22 DIAGNOSIS — Z12.12 SCREENING FOR COLORECTAL CANCER: ICD-10-CM

## 2021-06-22 DIAGNOSIS — Z86.16 HISTORY OF 2019 NOVEL CORONAVIRUS DISEASE (COVID-19): ICD-10-CM

## 2021-06-22 DIAGNOSIS — I10 ESSENTIAL HYPERTENSION: ICD-10-CM

## 2021-06-22 DIAGNOSIS — E55.9 VITAMIN D DEFICIENCY: ICD-10-CM

## 2021-06-22 DIAGNOSIS — L40.50 PSORIASIS WITH ARTHROPATHY (HCC): ICD-10-CM

## 2021-06-22 PROCEDURE — 99396 PREV VISIT EST AGE 40-64: CPT | Performed by: FAMILY MEDICINE

## 2021-06-22 PROCEDURE — 3008F BODY MASS INDEX DOCD: CPT | Performed by: FAMILY MEDICINE

## 2021-06-22 PROCEDURE — 1036F TOBACCO NON-USER: CPT | Performed by: FAMILY MEDICINE

## 2021-06-22 PROCEDURE — 99214 OFFICE O/P EST MOD 30 MIN: CPT | Performed by: FAMILY MEDICINE

## 2021-06-22 PROCEDURE — 3725F SCREEN DEPRESSION PERFORMED: CPT | Performed by: FAMILY MEDICINE

## 2021-06-22 RX ORDER — METOPROLOL SUCCINATE 50 MG/1
50 TABLET, EXTENDED RELEASE ORAL DAILY
Qty: 90 TABLET | Refills: 3 | Status: SHIPPED | OUTPATIENT
Start: 2021-06-22 | End: 2022-07-13

## 2021-06-22 RX ORDER — ALBUTEROL SULFATE 90 UG/1
2 AEROSOL, METERED RESPIRATORY (INHALATION) EVERY 6 HOURS PRN
Qty: 18 G | Refills: 3 | Status: SHIPPED | OUTPATIENT
Start: 2021-06-22 | End: 2022-08-08 | Stop reason: SDUPTHER

## 2021-06-22 RX ORDER — METHOCARBAMOL 750 MG/1
50000 TABLET ORAL DAILY
Qty: 12 CAPSULE | Refills: 3 | Status: SHIPPED | OUTPATIENT
Start: 2021-06-22 | End: 2022-08-08 | Stop reason: SDUPTHER

## 2021-06-22 RX ORDER — LOSARTAN POTASSIUM AND HYDROCHLOROTHIAZIDE 12.5; 1 MG/1; MG/1
1 TABLET ORAL DAILY
Qty: 90 TABLET | Refills: 3 | Status: SHIPPED | OUTPATIENT
Start: 2021-06-22 | End: 2022-07-13

## 2021-06-22 RX ORDER — LEVOTHYROXINE SODIUM 0.07 MG/1
75 TABLET ORAL DAILY
Qty: 90 TABLET | Refills: 3 | Status: SHIPPED | OUTPATIENT
Start: 2021-06-22 | End: 2022-07-13

## 2021-06-22 NOTE — PATIENT INSTRUCTIONS
Mediterranean Diet   AMBULATORY CARE:   A Mediterranean diet  is a meal plan that includes foods that are commonly eaten in countries that border the [place]  This meal plan may provide several health benefits  These include losing or maintaining weight, and decreasing blood pressure, blood sugar, and cholesterol levels  It may also help protect against certain health conditions such as heart disease, cancer, type 2 diabetes, and Alzheimer disease  Work with a dietitian to develop a meal plan that is right for you  Foods to include in the Mediterranean diet:   Include fruits and vegetables in each meal   Eat a variety of fresh f    Physical Activity for Older Adults   AMBULATORY CARE:   What you need to know about physical activity and aging:  Physical activity can help you get or stay physically and mentally healthy as you age  You may be able to do your daily activities more easily  If you have arthritis, your joints may move more easily and with less pain  Your bones and muscles will get stronger  Strength and better balance help prevent osteoporosis and reduce your risk for falls  Regular activity can improve your mood and appetite, and help you sleep better  Your risk for diseases such as cancer, heart disease, diabetes, and stroke will be lower  Activity can help you control your blood pressure and blood sugar levels, and lower your cholesterol  Activity can also slow or prevent cognitive (thinking) problems as you age  Call your doctor or physical therapist if:   You have pain with any physical activity  You have questions or concerns about physical activity or your health  Physical activity safety:   Talk to your healthcare provider before you start doing physical activities  Your provider will check your general health  He or she may recommend you avoid certain activities based on your health   He or she will also make sure your physical activity plan works with any medicines you are taking  Some medicines can make you sleepy or cause balance problems  These can make certain physical activities less safe  Start slowly and work up to more activity  It is important not to become highly active too quickly  Your body will need time to adjust  For example, you can cause serious injury if you try to lift a weight that is too heavy  Your healthcare provider can help you create a plan  The plan may start with a few minutes of physical activity on certain days of the week  You can add activities slowly over time  This may include making sessions longer, or being active on more days of the week  You can also add more weight as you get stronger  Do a variety of activities throughout the week  Do conditioning, strength training, flexibility, and balance activities  General guidelines are included below for how much of each activity to get  Your healthcare provider or physical therapist may give you specific instructions to follow  Stretch before and after you do any conditioning or strengthening activities  You can also do stretching activities on days you do not do any other kind of activity  Move slowly and smoothly  Avoid fast or jerky motions to help prevent an injury  Do exercises and stretches on both sides  This helps the muscles on both sides remain strong and flexible  Stop if you feel sharp pain or an increase in pain  Stop the exercise and contact your healthcare provider if you have these symptoms  It is normal to feel some discomfort, such as a dull ache, during exercise  Regular exercise will help decrease your discomfort over time  Drink liquid before, during, and after activity  Liquid helps prevent dehydration during exercise  Dehydration can cause you to become dizzy or to fall  Liquids are especially important on hot days  Activities that help improve flexibility:  You can improve your flexibility by doing stretching activities   Only stretch far enough that you feel the stretch but do not feel pain  Hold the stretch for 30 to 60 seconds, or for as long as directed  Repeat the stretch 2 or 3 times before you move to the next body area  Breathe normally  Do not hold your breath  It is important to breathe in and out so you do not tense up during exercise  Tension could prevent your muscles from stretching  The following are examples of flexibility activities:  Crossover arm stretch:  Relax your shoulders  Hold your upper arm with the opposite hand  Pull your arm across your chest until you feel a stretch  Hold the stretch for 30 seconds  Return to the starting position  Back stretch:  Lie on your back with your hands behind your head  Bend your knees and turn the lower half of your body to one side  Hold this position for 10 seconds  Repeat 3 times on each side  Hip stretch:  Lie on the ground  Place both hands on the shin of 1 leg  Pull your knee toward your chest  Repeat on the other side  Standing quadriceps stretch:  Stand and place one hand against a wall or hold the back of a chair for balance  With your weight on one leg, bend your other leg and grab your ankle  Pull your heel toward your buttocks  Standing hamstring stretch:  Stand with your feet hips distance apart  Life 1 leg and rest it on a firm surface, such as a table or chair  Keep your toes pointing up  Slide your hands forward along the side of your leg until you feel a stretch  Keep your chest lifted and your back straight  Hold for 30 seconds  Activities that help improve balance:  Good balance can help you prevent falls  Do balance activities a few times each week  You might want to ask someone to help you while you do these activities  The person can help make sure you do not fall  Make sure your path or activity area is clear of objects before you begin   The following are examples of activities that help improve balance:  Balance on 1 foot:  Hold something sturdy, such as a wall, chair, or walker  Balance your weight on both feet first  Then slowly lift 1 foot off the ground  When you have your balance, you can try letting go of the sturdy object  Make sure it is available for you to hold again if needed  Try to stand on 1 foot for about 10 seconds  Then repeat on the other foot  Walking in a straight line:  Put your weight equally on both feet to start  Make sure you have your balance  Then walk forward slowly, putting 1 foot directly in front of the other  Walking backward: If no one is available to stand next to you, stand next to a wall  Lean against the wall, and keep your arm on the wall as you walk  Make sure you have your balance  Then walk backward  Go slowly  Make sure you have good balance before you take the next step back  Wojciech Chi:  Wojciech Chi combines slow, precise movements with deep breathing and meditation  Classes may be available  An instructor will show you how to do the moves  Activities that help improve conditioning:  Conditioning includes activities that increase your heart rate and breathing  Activity that uses body weight is called weightbearing exercise  Examples include walking, dancing, and raking leaves  Weightbearing activities help strengthen bones  Nonweightbearing activities include riding a bicycle and swimming  Aim for 150 to 300 minutes (2 5 to 5 hours) of moderate aerobic activity each week  The following are examples of activities that help improve conditioning:  Walking:  Walking is a good, low-impact exercise  If you are not able to go outdoors, you can still walk in your home  Make sure the walking path in your home is clear and has good lighting  You can also march in place  Chair exercises:  Chair exercises are a safe way to move if you have balance problems  Sit in a hard chair that has a back  Keep your feet on the ground when you work your arms   Hold the seat or arms of the chair when you work your legs  You can lift weights, use a resistance band, hold an object such as a soup can, or just move your arms and legs  Chair exercise group classes may also be available  An instructor shows you moves to do while you sit in the chair  Water aerobics:  Water creates resistance  This means it is harder to move in water than it is on dry ground  Water aerobics can help prevent falls while you exercise  You will raise your heart rate and breathing just by walking along the bottom of the pool through the water  You can also  place and work your upper body  Hold pool equipment such as a flotation device or pool noodle to add weight to your activity  Water aerobics group classes may also be available  An instructor shows you moves to do in the water  Activities that help build strength:  Strength training helps you keep the muscles you have and build new muscles  Strong muscles help protect your bones  You can also improve your balance by strengthening your leg, back, and core (abdomen) muscles  If you do not have wights or resistance bands, you can use household items, such as soup cans  You can stand or sit in a chair or wheelchair when you do strength training  Try to work all the major muscle groups, such as your legs, arms, abdomen, and chest  Do strength training at least 2 times each week  Spread the days out so you are not doing strength training 2 days in a row  You can cause injury if you do not rest muscles in between sessions  The following are examples of strength training exercises:  Weightlifting:  Start with a weight you can lift easily  You can work up to [employer]  Do not try to lift heavy weights right away  You might cause a muscle or tendon injury  Repeat each move until you cannot do it even 1 more time  That is 1 set  Do 2 or 3 sets on each area  Resistance training: The ends of a resistance band can be held in your hands   You can tie 1 end to a sturdy object  Repeat each move until you cannot do it even 1 more time  That is 1 set  Do 2 or 3 sets on each area  Other activities:  Work such as digging and shoveling can strengthen muscles  Exercises such as push-ups, sit-ups, or squats can also build muscles  Tips to help you stay on track:   Start slowly and work up  You do not have to do all the activity at one time  You can do a few minutes at a time  Remember that some physical activity is better than none  Stand up during the day, even if you cannot walk around  Your body uses more energy when you stand  When you do conditioning activities, aim for a speed that is challenging but not too difficult  You should be able to speak a few words at a time but not be able to sing  Plan activities you enjoy  Do a variety of activities so you stay challenged  You do not have to do regular exercises to be active  Walk outdoors, go shopping, or visit a museum  The more you enjoy your activities, the easier it will be to continue doing them  Ask for support from the people in your life  Go for a walk after dinner with your family  Meet friends at the park  Find someone who likes the same classes you like  Make plans to attend class together  You may be more likely to go if you know another person is counting on you to be there  Get involved in community events, such as cleaning a community park  Ask someone to help you stay on track  For example, you can tell the person about your daily or weekly activity  What you need to know about nutrition and activity:  Healthy foods will give you the energy you need to be active  Activity and good nutrition work together to help you reach or maintain a healthy weight  Healthy foods include fruits, vegetables, lean meats, fish, cooked beans, whole-grain breads, and low-fat dairy products  Your healthcare provider can help you create a healthy meal plan   He or she can tell you how many calories you need to stay active and still lose weight if needed  Follow up with your doctor or physical therapist as directed:  Write down your questions so you remember to ask them during your visits  © Copyright [address] Information is for End User's use only and may not be sold, redistributed or otherwise used for commercial purposes  All illustrations and images included in CareNotes® are the copyrighted property of A D A M , Inc  or [facility] Adam Chris   The above information is an  only  It is not intended as medical advice for individual conditions or treatments  Talk to your doctor, nurse or pharmacist before following any medical regimen to see if it is safe and effective for you  · ruits and vegetables  · Choose whole grains every day  These foods include whole-grain breads, pastas, and cereals  It also includes brown rice, quinoa, and millet  · Use unsaturated fats instead of saturated fats  Cook with olive or canola oil  Limit saturated fats, such as butter, margarine, and shortening  Saturated fat is an unhealthy fat that can increase your cholesterol levels  · Choose plant foods, poultry, and fish as your main sources of protein  ? Eat plant-based foods that provide protein,  such as lentils, beans, chickpeas, nuts, and seeds  Choose mostly plant-based foods in place of meat on most days of the week  ? Eat protein foods high in omega-3 fats  Fish high in omega-3 fats include salmon, trout, and tuna  Include these types of fish 1 or 2 times each week  Limit fish high in mercury, such as shark, swordfish, tilefish, and saji mackerel  Omega-3 fats are also found in walnuts and flaxseed  ? Choose poultry (chicken or turkey)  without skin instead of red meat  Red meat is high in saturated fat  Limit eggs and high-fat meats, such as ching, sausage, and hot dogs  · Choose low-fat dairy foods  such as nonfat or 1% milk, or low-fat almond, cashew, or soy milk   Other examples include low-fat cheese, yogurt, and cottage cheese  · Limit sweets  Limit your intake of high-sugar foods, such as soda, desserts, and candy  · Talk to your healthcare provider about alcohol  Studies have shown that moderate intake of wine may reduce the risk of heart disease  A moderate amount of wine is 1 serving for women and men 65 years and older each day  Two servings is recommended for men 24to 59years of age each day  A serving of wine is 5 ounces  Other things you need to know if you follow the Mediterranean diet:   · Include foods high in iron and vitamin C   Plant-based foods that are high in iron include spinach, beans, tofu, and artichoke  Eat a serving of vitamin C with any iron-rich food to help your body absorb more iron  Examples include oranges, strawberries, cantaloupe, broccoli, and yellow peppers  · Get regular physical activity  The Mediterranean diet will have the most benefit if you get regular physical activity  Get 30 minutes of physical activity at least 5 days a week  Choose physical activities that increase your heart rate  Examples include walking, hiking, swimming, and riding a bike  Ask your healthcare provider about the best exercise plan for you  © Copyright [address] Information is for End User's use only and may not be sold, redistributed or otherwise used for commercial purposes  All illustrations and images included in CareNotes® are the copyrighted property of [employer]  or [facility] Adam Chris   The above information is an  only  It is not intended as medical advice for individual conditions or treatments  Talk to your doctor, nurse or pharmacist before following any medical regimen to see if it is safe and effective for you

## 2021-06-22 NOTE — PROGRESS NOTES
Assessment/Plan:    No problem-specific Assessment & Plan notes found for this encounter  Diagnoses and all orders for this visit:    Encounter for screening mammogram for breast cancer  -     Mammo screening bilateral w 3d & cad; Future  -     CBC and differential; Future  -     Lipid panel; Future  -     TSH, 3rd generation; Future  -     Vitamin D 25 hydroxy; Future    Screening for colorectal cancer  -     Cologuard; Future  -     CBC and differential; Future  -     Lipid panel; Future  -     TSH, 3rd generation; Future  -     Vitamin D 25 hydroxy; Future    Essential hypertension  -     metoprolol succinate (TOPROL-XL) 50 mg 24 hr tablet; Take 1 tablet (50 mg total) by mouth daily  -     losartan-hydrochlorothiazide (HYZAAR) 100-12 5 MG per tablet; Take 1 tablet by mouth daily  -     CBC and differential; Future  -     Lipid panel; Future  -     TSH, 3rd generation; Future  -     Vitamin D 25 hydroxy; Future    Acquired hypothyroidism  -     levothyroxine 75 mcg tablet; Take 1 tablet (75 mcg total) by mouth daily  -     CBC and differential; Future  -     Lipid panel; Future  -     TSH, 3rd generation; Future  -     Vitamin D 25 hydroxy; Future    Vitamin D deficiency  -     Cholecalciferol (D3-50) 1 25 MG (13132 UT) capsule; Take 1 capsule (50,000 Units total) by mouth daily  -     CBC and differential; Future  -     Lipid panel; Future  -     TSH, 3rd generation; Future  -     Vitamin D 25 hydroxy; Future    History of 2019 novel coronavirus disease (COVID-19)  -     albuterol (PROVENTIL HFA,VENTOLIN HFA) 90 mcg/act inhaler; Inhale 2 puffs every 6 (six) hours as needed for wheezing or shortness of breath  -     CBC and differential; Future  -     Lipid panel; Future  -     TSH, 3rd generation; Future  -     Vitamin D 25 hydroxy; Future    Wheezing  -     albuterol (PROVENTIL HFA,VENTOLIN HFA) 90 mcg/act inhaler;  Inhale 2 puffs every 6 (six) hours as needed for wheezing or shortness of breath  -     CBC and differential; Future  -     Lipid panel; Future  -     TSH, 3rd generation; Future  -     Vitamin D 25 hydroxy; Future    Psoriasis with arthropathy (HCC)  -     CBC and differential; Future  -     Lipid panel; Future  -     TSH, 3rd generation; Future  -     Vitamin D 25 hydroxy; Future    Other orders  -     Discontinue: oxyCODONE-acetaminophen (PERCOCET) 5-325 mg per tablet; Take by mouth (Patient not taking: Reported on 6/22/2021)  -     Discontinue: azithromycin (ZITHROMAX) 250 mg tablet; Take 2 tablets by mouth on day one; then one tablet daily for 4 days  (Patient not taking: Reported on 6/22/2021)          Subjective:      Patient ID: Ada Fiore is a 62 y o  female  It COVID-19 back in December of 2020  She has had a lingering cough since  She is also on lisinopril 5 mg  Her blood pressure is not controlled on her current regimen  She has no chest pain or shortness of breath  She has no PND, orthopnea, or dyspnea on exertion  She has insulin resistance listed as a medical problem  I do not see any other criteria for metabolic syndrome as her triglycerides are normal and her HDL is good  She has low vitamin-D levels and is on a supplement  She has hypothyroidism  She is on Synthroid  I do not see a recent TSH value, however  The following portions of the patient's history were reviewed and updated as appropriate:   She  has a past medical history of Allergic, Asthma, Bilateral ovarian cysts, Disease of thyroid gland, Eczema, Familial cavernous cerebral angioma ([address] ), Fibromyalgia, Hypertension, Insomnia, MI (myocardial infarction) ([address] ), Migraines, Osteoarthritis, Polyarthropathy, Polymyalgia ([address] ), Psoriasis, TIA (transient ischemic attack), and Vitamin deficiency    She   Patient Active Problem List    Diagnosis Date Noted    History of 2019 novel coronavirus disease (COVID-19) 01/05/2021    Psoriasis with arthropathy ([address] ) 11/23/2018    Pain in both hands 04/18/2018    Polyarthralgia 04/18/2018    Polymyalgia ([address] ) 04/18/2018    Allergic rhinitis due to allergen 04/18/2018    Insulin resistance 04/18/2018    Acquired hypothyroidism 04/18/2018    Essential hypertension 04/18/2018    Vitamin D deficiency 04/18/2018     She  has a past surgical history that includes Endometrial ablation w/ novasure (Bilateral, 2010) and Dilation and curettage of uterus  Her family history includes Anxiety disorder in her sister; Arthritis in her maternal grandmother; Asthma in her sister; Cancer in her mother; Crohn's disease in her sister; Depression in her sister; Heart disease in her maternal grandmother; Hyperlipidemia in her father; Rheum arthritis in her mother and sister; Sjogren's syndrome in her daughter; Stroke in her maternal grandmother; Valvular heart disease in her daughter and paternal grandfather  She  reports that she quit smoking about 32 years ago  Her smoking use included cigarettes  She has never used smokeless tobacco  She reports current alcohol use  She reports that she does not use drugs    Current Outpatient Medications   Medication Sig Dispense Refill    albuterol (PROVENTIL HFA,VENTOLIN HFA) 90 mcg/act inhaler Inhale 2 puffs every 6 (six) hours as needed for wheezing or shortness of breath 18 g 3    Ascorbic Acid (VITAMIN C) 1000 MG tablet Take 1 tablet (1,000 mg total) by mouth daily 90 tablet 1    betamethasone dipropionate (DIPROSONE) 0 05 % cream Apply topically 2 (two) times a day 30 g 0    calcipotriene (DOVONOX) 0 005 % ointment Apply topically 2 (two) times a day 60 g 5    Cholecalciferol (D3-50) 1 25 MG (30561 UT) capsule Take 1 capsule (50,000 Units total) by mouth daily 12 capsule 3    Cyanocobalamin (Vitamin B-12) 2500 MCG SUBL Place 1 tablet (2,500 mcg total) under the tongue every other day 90 each 1    diclofenac (VOLTAREN) 75 mg EC tablet Take 75 mg by mouth 2 (two) times a day      fexofenadine (ALLEGRA) 60 MG tablet TAKE 1 TABLET BY MOUTH ONCE DAILY AS NEEDED (ALLERGIES) 90 tablet 0    fluticasone (FLONASE) 50 mcg/act nasal spray 1 spray each nostril 2 x daily x 1 month then prn congestion or allergies 1 Bottle 5    folic acid (FOLVITE) 1 mg tablet Take 1 mg by mouth daily      glucosamine-chondroitin 500-400 MG tablet Take 1 tablet by mouth 3 (three) times a day 60 tablet 0    levothyroxine 75 mcg tablet Take 1 tablet (75 mcg total) by mouth daily 90 tablet 3    methotrexate 2 5 mg tablet Take by mouth once a week 6 tabs po once weekly on Friday      metoprolol succinate (TOPROL-XL) 50 mg 24 hr tablet Take 1 tablet (50 mg total) by mouth daily 90 tablet 3    solifenacin (VESIcare) 5 mg tablet Take by mouth      losartan-hydrochlorothiazide (HYZAAR) 100-12 5 MG per tablet Take 1 tablet by mouth daily 90 tablet 3     No current facility-administered medications for this visit       Current Outpatient Medications on File Prior to Visit   Medication Sig    Ascorbic Acid (VITAMIN C) 1000 MG tablet Take 1 tablet (1,000 mg total) by mouth daily    betamethasone dipropionate (DIPROSONE) 0 05 % cream Apply topically 2 (two) times a day    calcipotriene (DOVONOX) 0 005 % ointment Apply topically 2 (two) times a day    Cyanocobalamin (Vitamin B-12) 2500 MCG SUBL Place 1 tablet (2,500 mcg total) under the tongue every other day    diclofenac (VOLTAREN) 75 mg EC tablet Take 75 mg by mouth 2 (two) times a day    fexofenadine (ALLEGRA) 60 MG tablet TAKE 1 TABLET BY MOUTH ONCE DAILY AS NEEDED (ALLERGIES)    fluticasone (FLONASE) 50 mcg/act nasal spray 1 spray each nostril 2 x daily x 1 month then prn congestion or allergies    folic acid (FOLVITE) 1 mg tablet Take 1 mg by mouth daily    glucosamine-chondroitin 500-400 MG tablet Take 1 tablet by mouth 3 (three) times a day    methotrexate 2 5 mg tablet Take by mouth once a week 6 tabs po once weekly on Friday    solifenacin (VESIcare) 5 mg tablet Take by mouth    [DISCONTINUED] albuterol (PROVENTIL HFA,VENTOLIN HFA) 90 mcg/act inhaler Inhale 2 puffs every 6 (six) hours as needed for wheezing or shortness of breath    [DISCONTINUED] D3-50 1 25 MG (55357 UT) capsule TAKE 1 CAPSULE (50965 UNITS) BY MOUTH EVERY 30 DAYS    [DISCONTINUED] hydrochlorothiazide (HYDRODIURIL) 12 5 mg tablet Take 1 tablet (12 5 mg total) by mouth daily    [DISCONTINUED] levothyroxine 75 mcg tablet Take 1 tablet by mouth once daily    [DISCONTINUED] lisinopril (ZESTRIL) 5 mg tablet Take 1 tablet by mouth once daily    [DISCONTINUED] metoprolol succinate (TOPROL-XL) 50 mg 24 hr tablet Take 1 tablet by mouth once daily    [DISCONTINUED] azithromycin (ZITHROMAX) 250 mg tablet Take 2 tablets by mouth on day one; then one tablet daily for 4 days  (Patient not taking: Reported on 6/22/2021)    [DISCONTINUED] benzonatate (TESSALON PERLES) 100 mg capsule Take 1 capsule (100 mg total) by mouth 3 (three) times a day as needed for cough (Patient not taking: Reported on 6/22/2021)    [DISCONTINUED] oxyCODONE-acetaminophen (PERCOCET) 5-325 mg per tablet Take by mouth (Patient not taking: Reported on 6/22/2021)     No current facility-administered medications on file prior to visit  She has No Known Allergies       Review of Systems   All other systems reviewed and are negative  Objective:      /78   Pulse 91   Temp 98 7 °F (37 1 °C)   Ht 5' 3" (1 6 m)   Wt 86 2 kg (190 lb)   SpO2 98%   BMI 33 66 kg/m²          Physical Exam  Vitals and nursing note reviewed  Constitutional:       Appearance: Normal appearance  She is obese  HENT:      Head: Normocephalic and atraumatic  Cardiovascular:      Rate and Rhythm: Normal rate and regular rhythm  Pulses: Normal pulses  Heart sounds: Normal heart sounds  Pulmonary:      Effort: Pulmonary effort is normal       Breath sounds: Normal breath sounds  Abdominal:      General: Abdomen is flat  Bowel sounds are normal       Palpations: Abdomen is soft  Musculoskeletal:         General: Normal range of motion  Cervical back: Normal range of motion and neck supple  Skin:     General: Skin is warm and dry  Capillary Refill: Capillary refill takes less than 2 seconds  Neurological:      General: No focal deficit present  Mental Status: She is alert and oriented to person, place, and time  Mental status is at baseline  Psychiatric:         Mood and Affect: Mood normal          Behavior: Behavior normal          Thought Content:  Thought content normal          Judgment: Judgment normal

## 2021-06-22 NOTE — PROGRESS NOTES
Assessment/Plan     Healthy female exam       She is not following a specific diet  2  Patient Counseling:  --Nutrition: Stressed importance of moderation in sodium/caffeine intake, saturated fat and cholesterol, caloric balance, sufficient intake of fresh fruits, vegetables, fiber, calcium, iron, and 1 mg of folate supplement per day (for females capable of pregnancy)  --Discussed the issue of estrogen replacement, calcium supplement, and the daily use of baby aspirin  --Exercise: Stressed the importance of regular exercise  --Substance Abuse: Discussed cessation/primary prevention of tobacco, alcohol, or other drug use; driving or other dangerous activities under the influence; availability of treatment for abuse  --Sexuality: Discussed sexually transmitted diseases, partner selection, use of condoms, avoidance of unintended pregnancy  and contraceptive alternatives  --Injury prevention: Discussed safety belts, safety helmets, smoke detector, smoking near bedding or upholstery  --Dental health: Discussed importance of regular tooth brushing, flossing, and dental visits  --Immunizations reviewed  --Discussed benefits of screening colonoscopy  --After hours service discussed with patient    3  Discussed the patient's BMI with her  The BMI is above average; BMI management plan is completed  4  Follow up as needed for acute illness  Benji Dallas is a 62 y o  female and is here for a comprehensive physical exam  The patient reports no problems  Do you take any herbs or supplements that were not prescribed by a doctor? no  Are you taking calcium supplements? no  Are you taking aspirin daily? no     History:  LMP: No LMP recorded  Patient has had an ablation    Menopause at 49 years  Last pap date:   Abnormal pap? no  : 4  Para: 4    The following portions of the patient's history were reviewed and updated as appropriate:   She  has a past medical history of Allergic, Asthma, Bilateral ovarian cysts, Disease of thyroid gland, Eczema, Familial cavernous cerebral angioma (HCC), Fibromyalgia, Hypertension, Insomnia, MI (myocardial infarction) ([facility] ), Migraines, Osteoarthritis, Polyarthropathy, Polymyalgia ([facility] ), Psoriasis, TIA (transient ischemic attack), and Vitamin deficiency  She   Patient Active Problem List    Diagnosis Date Noted    History of 2019 novel coronavirus disease (COVID-19) 01/05/2021    Psoriasis with arthropathy ([facility] ) 11/23/2018    Pain in both hands 04/18/2018    Polyarthralgia 04/18/2018    Polymyalgia ([facility] ) 04/18/2018    Allergic rhinitis due to allergen 04/18/2018    Insulin resistance 04/18/2018    Acquired hypothyroidism 04/18/2018    Essential hypertension 04/18/2018    Vitamin D deficiency 04/18/2018     She  has a past surgical history that includes Endometrial ablation w/ novasure (Bilateral, 2010) and Dilation and curettage of uterus  Her family history includes Anxiety disorder in her sister; Arthritis in her maternal grandmother; Asthma in her sister; Cancer in her mother; Crohn's disease in her sister; Depression in her sister; Heart disease in her maternal grandmother; Hyperlipidemia in her father; Rheum arthritis in her mother and sister; Sjogren's syndrome in her daughter; Stroke in her maternal grandmother; Valvular heart disease in her daughter and paternal grandfather  She  reports that she quit smoking about 32 years ago  Her smoking use included cigarettes  She has never used smokeless tobacco  She reports current alcohol use  She reports that she does not use drugs    Current Outpatient Medications   Medication Sig Dispense Refill    albuterol (PROVENTIL HFA,VENTOLIN HFA) 90 mcg/act inhaler Inhale 2 puffs every 6 (six) hours as needed for wheezing or shortness of breath 18 g 3    Ascorbic Acid (VITAMIN C) 1000 MG tablet Take 1 tablet (1,000 mg total) by mouth daily 90 tablet 1    betamethasone dipropionate (DIPROSONE) 0 05 % cream Apply topically 2 (two) times a day 30 g 0    calcipotriene (DOVONOX) 0 005 % ointment Apply topically 2 (two) times a day 60 g 5    Cholecalciferol (D3-50) 1 25 MG (33292 UT) capsule Take 1 capsule (50,000 Units total) by mouth daily 12 capsule 3    Cyanocobalamin (Vitamin B-12) 2500 MCG SUBL Place 1 tablet (2,500 mcg total) under the tongue every other day 90 each 1    diclofenac (VOLTAREN) 75 mg EC tablet Take 75 mg by mouth 2 (two) times a day      fexofenadine (ALLEGRA) 60 MG tablet TAKE 1 TABLET BY MOUTH ONCE DAILY AS NEEDED (ALLERGIES) 90 tablet 0    fluticasone (FLONASE) 50 mcg/act nasal spray 1 spray each nostril 2 x daily x 1 month then prn congestion or allergies 1 Bottle 5    folic acid (FOLVITE) 1 mg tablet Take 1 mg by mouth daily      glucosamine-chondroitin 500-400 MG tablet Take 1 tablet by mouth 3 (three) times a day 60 tablet 0    levothyroxine 75 mcg tablet Take 1 tablet (75 mcg total) by mouth daily 90 tablet 3    methotrexate 2 5 mg tablet Take by mouth once a week 6 tabs po once weekly on Friday      metoprolol succinate (TOPROL-XL) 50 mg 24 hr tablet Take 1 tablet (50 mg total) by mouth daily 90 tablet 3    solifenacin (VESIcare) 5 mg tablet Take by mouth      losartan-hydrochlorothiazide (HYZAAR) 100-12 5 MG per tablet Take 1 tablet by mouth daily 90 tablet 3     No current facility-administered medications for this visit       Current Outpatient Medications on File Prior to Visit   Medication Sig    Ascorbic Acid (VITAMIN C) 1000 MG tablet Take 1 tablet (1,000 mg total) by mouth daily    betamethasone dipropionate (DIPROSONE) 0 05 % cream Apply topically 2 (two) times a day    calcipotriene (DOVONOX) 0 005 % ointment Apply topically 2 (two) times a day    Cyanocobalamin (Vitamin B-12) 2500 MCG SUBL Place 1 tablet (2,500 mcg total) under the tongue every other day    diclofenac (VOLTAREN) 75 mg EC tablet Take 75 mg by mouth 2 (two) times a day    fexofenadine (ALLEGRA) 60 MG tablet TAKE 1 TABLET BY MOUTH ONCE DAILY AS NEEDED (ALLERGIES)    fluticasone (FLONASE) 50 mcg/act nasal spray 1 spray each nostril 2 x daily x 1 month then prn congestion or allergies    folic acid (FOLVITE) 1 mg tablet Take 1 mg by mouth daily    glucosamine-chondroitin 500-400 MG tablet Take 1 tablet by mouth 3 (three) times a day    methotrexate 2 5 mg tablet Take by mouth once a week 6 tabs po once weekly on Friday    solifenacin (VESIcare) 5 mg tablet Take by mouth    [DISCONTINUED] albuterol (PROVENTIL HFA,VENTOLIN HFA) 90 mcg/act inhaler Inhale 2 puffs every 6 (six) hours as needed for wheezing or shortness of breath    [DISCONTINUED] D3-50 1 25 MG (64706 UT) capsule TAKE 1 CAPSULE (46576 UNITS) BY MOUTH EVERY 30 DAYS    [DISCONTINUED] hydrochlorothiazide (HYDRODIURIL) 12 5 mg tablet Take 1 tablet (12 5 mg total) by mouth daily    [DISCONTINUED] levothyroxine 75 mcg tablet Take 1 tablet by mouth once daily    [DISCONTINUED] lisinopril (ZESTRIL) 5 mg tablet Take 1 tablet by mouth once daily    [DISCONTINUED] metoprolol succinate (TOPROL-XL) 50 mg 24 hr tablet Take 1 tablet by mouth once daily    [DISCONTINUED] azithromycin (ZITHROMAX) 250 mg tablet Take 2 tablets by mouth on day one; then one tablet daily for 4 days  (Patient not taking: Reported on 6/22/2021)    [DISCONTINUED] benzonatate (TESSALON PERLES) 100 mg capsule Take 1 capsule (100 mg total) by mouth 3 (three) times a day as needed for cough (Patient not taking: Reported on 6/22/2021)    [DISCONTINUED] oxyCODONE-acetaminophen (PERCOCET) 5-325 mg per tablet Take by mouth (Patient not taking: Reported on 6/22/2021)     No current facility-administered medications on file prior to visit  She has No Known Allergies       Review of Systems  Do you have pain that bothers you in your daily life? yes  Pertinent items are noted in HPI       Objective     /78   Pulse 91   Temp 98 7 °F (37 1 °C)   Ht 5' 3" (1 6 m)   Wt 86 2 kg (190 lb)   SpO2 98%   BMI 33 66 kg/m²     General Appearance:    Alert, cooperative, no distress, appears stated age   Head:    Normocephalic, without obvious abnormality, atraumatic   Eyes:    PERRL, conjunctiva/corneas clear, EOM's intact, fundi     benign, both eyes   Ears:    Normal TM's and external ear canals, both ears   Nose:   Nares normal, septum midline, mucosa normal, no drainage    or sinus tenderness   Throat:   Lips, mucosa, and tongue normal; teeth and gums normal   Neck:   Supple, symmetrical, trachea midline, no adenopathy;     thyroid:  no enlargement/tenderness/nodules; no carotid    bruit or JVD   Back:     Symmetric, no curvature, ROM normal, no CVA tenderness   Lungs:     Clear to auscultation bilaterally, respirations unlabored   Chest Wall:    No tenderness or deformity    Heart:    Regular rate and rhythm, S1 and S2 normal, no murmur, rub   or gallop   Breast Exam:    No tenderness, masses, or nipple abnormality   Abdomen:     Soft, non-tender, bowel sounds active all four quadrants,     no masses, no organomegaly   BMI Counseling: Body mass index is 33 66 kg/m²  The BMI is above normal  Nutrition recommendations include reducing portion sizes, decreasing overall calorie intake, 3-5 servings of fruits/vegetables daily, reducing fast food intake, consuming healthier snacks, decreasing soda and/or juice intake, moderation in carbohydrate intake, increasing intake of lean protein, reducing intake of saturated fat and trans fat and reducing intake of cholesterol  Exercise recommendations include moderate aerobic physical activity for 150 minutes/week, vigorous aerobic physical activity for 75 minutes/week, exercising 3-5 times per week, joining a gym and strength training exercises          Extremities:   Extremities normal, atraumatic, no cyanosis or edema   Pulses:   2+ and symmetric all extremities   Skin:   Skin color, texture, turgor normal, no rashes or lesions   Lymph nodes: Cervical, supraclavicular, and axillary nodes normal   Neurologic:   CNII-XII intact, normal strength, sensation and reflexes     throughout

## 2021-08-06 ENCOUNTER — OFFICE VISIT (OUTPATIENT)
Dept: FAMILY MEDICINE CLINIC | Facility: CLINIC | Age: 61
End: 2021-08-06
Payer: COMMERCIAL

## 2021-08-06 VITALS
SYSTOLIC BLOOD PRESSURE: 118 MMHG | TEMPERATURE: 98.1 F | HEART RATE: 88 BPM | WEIGHT: 192 LBS | HEIGHT: 63 IN | OXYGEN SATURATION: 98 % | BODY MASS INDEX: 34.02 KG/M2 | DIASTOLIC BLOOD PRESSURE: 68 MMHG

## 2021-08-06 DIAGNOSIS — Z23 ENCOUNTER FOR IMMUNIZATION: ICD-10-CM

## 2021-08-06 DIAGNOSIS — I10 ESSENTIAL HYPERTENSION: ICD-10-CM

## 2021-08-06 DIAGNOSIS — E03.9 ACQUIRED HYPOTHYROIDISM: Primary | ICD-10-CM

## 2021-08-06 PROCEDURE — 3078F DIAST BP <80 MM HG: CPT | Performed by: FAMILY MEDICINE

## 2021-08-06 PROCEDURE — 3074F SYST BP LT 130 MM HG: CPT | Performed by: FAMILY MEDICINE

## 2021-08-06 PROCEDURE — 1036F TOBACCO NON-USER: CPT | Performed by: FAMILY MEDICINE

## 2021-08-06 PROCEDURE — 99213 OFFICE O/P EST LOW 20 MIN: CPT | Performed by: FAMILY MEDICINE

## 2021-08-06 PROCEDURE — 3008F BODY MASS INDEX DOCD: CPT | Performed by: FAMILY MEDICINE

## 2022-01-12 ENCOUNTER — TELEPHONE (OUTPATIENT)
Dept: FAMILY MEDICINE CLINIC | Facility: CLINIC | Age: 62
End: 2022-01-12

## 2022-01-12 NOTE — TELEPHONE ENCOUNTER
Called pt as she is due for mammo  Pt states she typically gets this done through her OBGYN and would like to continue to do so  She will call if she changes her mind  Has not had a mammo sine 2019 and pt will call OBGYN to coordinate her mammo

## 2022-02-07 ENCOUNTER — OFFICE VISIT (OUTPATIENT)
Dept: FAMILY MEDICINE CLINIC | Facility: CLINIC | Age: 62
End: 2022-02-07
Payer: COMMERCIAL

## 2022-02-07 VITALS
HEIGHT: 63 IN | DIASTOLIC BLOOD PRESSURE: 70 MMHG | BODY MASS INDEX: 33.91 KG/M2 | WEIGHT: 191.4 LBS | HEART RATE: 60 BPM | OXYGEN SATURATION: 100 % | RESPIRATION RATE: 18 BRPM | SYSTOLIC BLOOD PRESSURE: 118 MMHG | TEMPERATURE: 98.1 F

## 2022-02-07 DIAGNOSIS — Z12.11 SCREENING FOR COLORECTAL CANCER: ICD-10-CM

## 2022-02-07 DIAGNOSIS — L40.50 PSORIASIS WITH ARTHROPATHY (HCC): ICD-10-CM

## 2022-02-07 DIAGNOSIS — Z13.0 SCREENING FOR DEFICIENCY ANEMIA: ICD-10-CM

## 2022-02-07 DIAGNOSIS — I10 ESSENTIAL HYPERTENSION: Primary | ICD-10-CM

## 2022-02-07 DIAGNOSIS — Z13.220 SCREENING FOR HYPERLIPIDEMIA: ICD-10-CM

## 2022-02-07 DIAGNOSIS — Z12.31 ENCOUNTER FOR SCREENING MAMMOGRAM FOR BREAST CANCER: ICD-10-CM

## 2022-02-07 DIAGNOSIS — E03.9 ACQUIRED HYPOTHYROIDISM: ICD-10-CM

## 2022-02-07 DIAGNOSIS — Z13.1 SCREENING FOR DIABETES MELLITUS: ICD-10-CM

## 2022-02-07 DIAGNOSIS — E55.9 VITAMIN D DEFICIENCY: ICD-10-CM

## 2022-02-07 DIAGNOSIS — M35.3 POLYMYALGIA (HCC): ICD-10-CM

## 2022-02-07 DIAGNOSIS — E53.8 VITAMIN B12 DEFICIENCY: ICD-10-CM

## 2022-02-07 DIAGNOSIS — M79.641 PAIN IN BOTH HANDS: ICD-10-CM

## 2022-02-07 DIAGNOSIS — J30.89 SEASONAL ALLERGIC RHINITIS DUE TO OTHER ALLERGIC TRIGGER: ICD-10-CM

## 2022-02-07 DIAGNOSIS — M79.642 PAIN IN BOTH HANDS: ICD-10-CM

## 2022-02-07 DIAGNOSIS — Z23 ENCOUNTER FOR IMMUNIZATION: ICD-10-CM

## 2022-02-07 DIAGNOSIS — Z12.4 SCREENING FOR CERVICAL CANCER: ICD-10-CM

## 2022-02-07 DIAGNOSIS — Z12.12 SCREENING FOR COLORECTAL CANCER: ICD-10-CM

## 2022-02-07 PROCEDURE — 1036F TOBACCO NON-USER: CPT | Performed by: NURSE PRACTITIONER

## 2022-02-07 PROCEDURE — 3078F DIAST BP <80 MM HG: CPT | Performed by: NURSE PRACTITIONER

## 2022-02-07 PROCEDURE — 3074F SYST BP LT 130 MM HG: CPT | Performed by: NURSE PRACTITIONER

## 2022-02-07 PROCEDURE — 3008F BODY MASS INDEX DOCD: CPT | Performed by: NURSE PRACTITIONER

## 2022-02-07 PROCEDURE — 3725F SCREEN DEPRESSION PERFORMED: CPT | Performed by: NURSE PRACTITIONER

## 2022-02-07 PROCEDURE — 99213 OFFICE O/P EST LOW 20 MIN: CPT | Performed by: NURSE PRACTITIONER

## 2022-02-07 NOTE — PATIENT INSTRUCTIONS
Wellness Visit for Adults   WHAT YOU NEED TO KNOW:   What is a wellness visit? A wellness visit is when you see your healthcare provider to get screened for health problems  Your healthcare provider will also give you advice on how to stay healthy  Write down your questions so you remember to ask them  Ask your healthcare provider how often you should have a wellness visit  What happens at a wellness visit? Your healthcare provider will ask about your health, and your family history of health problems  This includes high blood pressure, heart disease, and cancer  He or she will ask if you have symptoms that concern you, if you smoke, and about your mood  You may also be asked about your intake of medicines, supplements, food, and alcohol  Any of the following may be done:  · Your weight  will be checked  Your height may also be checked so your body mass index (BMI) can be calculated  Your BMI shows if you are at a healthy weight  · Your blood pressure  and heart rate will be checked  Your temperature may also be checked  · Blood and urine tests  may be done  Blood tests may be done to check your cholesterol levels  Abnormal cholesterol levels increase your risk for heart disease and stroke  You may also need a blood or urine test to check for diabetes if you are at increased risk  Urine tests may be done to look for signs of an infection or kidney disease  · A physical exam  includes checking your heartbeat and lungs with a stethoscope  Your healthcare provider may also check your skin to look for sun damage  · Screening tests  may be recommended  A screening test is done to check for diseases that may not cause symptoms  The screening tests you may need depend on your age, gender, family history, and lifestyle habits  For example, colorectal screening may be recommended if you are 48years old or older  What screening tests do I need if I am a woman?    · A Pap smear  is used to screen for cervical cancer  Pap smears are usually done every 3 to 5 years depending on your age  You may need them more often if you have had abnormal Pap smear test results in the past  Ask your healthcare provider how often you should have a Pap smear  · A mammogram  is an x-ray of your breasts to screen for breast cancer  Experts recommend mammograms every 2 years starting at age 48 years  You may need a mammogram at age 52 years or younger if you have an increased risk for breast cancer  Talk to your healthcare provider about when you should start having mammograms and how often you need them  What vaccines might I need? · Get an influenza vaccine  every year  The influenza vaccine protects you from the flu  Several types of viruses cause the flu  The viruses change over time, so new vaccines are made each year  · Get a tetanus-diphtheria (Td) booster vaccine  every 10 years  This vaccine protects you against tetanus and diphtheria  Tetanus is a severe infection that may cause painful muscle spasms and lockjaw  Diphtheria is a severe bacterial infection that causes a thick covering in the back of your mouth and throat  · Get a human papillomavirus (HPV) vaccine  if you are female and aged 23 to 32 or male 23 to 24 and never received it  This vaccine protects you from HPV infection  HPV is the most common infection spread by sexual contact  HPV may also cause vaginal, penile, and anal cancers  · Get a pneumococcal vaccine  if you are aged 72 years or older  The pneumococcal vaccine is an injection given to protect you from pneumococcal disease  Pneumococcal disease is an infection caused by pneumococcal bacteria  The infection may cause pneumonia, meningitis, or an ear infection  · Get a shingles vaccine  if you are 60 or older, even if you have had shingles before  The shingles vaccine is an injection to protect you from the varicella-zoster virus  This is the same virus that causes chickenpox   Shingles is a painful rash that develops in people who had chickenpox or have been exposed to the virus  How can I eat healthy? My Plate is a model for planning healthy meals  It shows the types and amounts of foods that should go on your plate  Fruits and vegetables make up about half of your plate, and grains and protein make up the other half  A serving of dairy is included on the side of your plate  The amount of calories and serving sizes you need depends on your age, gender, weight, and height  Examples of healthy foods are listed below:  · Eat a variety of vegetables  such as dark green, red, and orange vegetables  You can also include canned vegetables low in sodium (salt) and frozen vegetables without added butter or sauces  · Eat a variety of fresh fruits , canned fruit in 100% juice, frozen fruit, and dried fruit  · Include whole grains  At least half of the grains you eat should be whole grains  Examples include whole-wheat bread, wheat pasta, brown rice, and whole-grain cereals such as oatmeal     · Eat a variety of protein foods such as seafood (fish and shellfish), lean meat, and poultry without skin (turkey and chicken)  Examples of lean meats include pork leg, shoulder, or tenderloin, and beef round, sirloin, tenderloin, and extra lean ground beef  Other protein foods include eggs and egg substitutes, beans, peas, soy products, nuts, and seeds  · Choose low-fat dairy products such as skim or 1% milk or low-fat yogurt, cheese, and cottage cheese  · Limit unhealthy fats  such as butter, hard margarine, and shortening  How much exercise do I need? Exercise at least 30 minutes per day on most days of the week  Some examples of exercise include walking, biking, dancing, and swimming  You can also fit in more physical activity by taking the stairs instead of the elevator or parking farther away from stores  Include muscle strengthening activities 2 days each week   Regular exercise provides many health benefits  It helps you manage your weight, and decreases your risk for type 2 diabetes, heart disease, stroke, and high blood pressure  Exercise can also help improve your mood  Ask your healthcare provider about the best exercise plan for you  What are some general health and safety guidelines I should follow? · Do not smoke  Nicotine and other chemicals in cigarettes and cigars can cause lung damage  Ask your healthcare provider for information if you currently smoke and need help to quit  E-cigarettes or smokeless tobacco still contain nicotine  Talk to your healthcare provider before you use these products  · Limit alcohol  A drink of alcohol is 12 ounces of beer, 5 ounces of wine, or 1½ ounces of liquor  · Lose weight, if needed  Being overweight increases your risk of certain health conditions  These include heart disease, high blood pressure, type 2 diabetes, and certain types of cancer  · Protect your skin  Do not sunbathe or use tanning beds  Use sunscreen with a SPF 15 or higher  Apply sunscreen at least 15 minutes before you go outside  Reapply sunscreen every 2 hours  Wear protective clothing, hats, and sunglasses when you are outside  · Drive safely  Always wear your seatbelt  Make sure everyone in your car wears a seatbelt  A seatbelt can save your life if you are in an accident  Do not use your cell phone when you are driving  This could distract you and cause an accident  Pull over if you need to make a call or send a text message  · Practice safe sex  Use latex condoms if are sexually active and have more than one partner  Your healthcare provider may recommend screening tests for sexually transmitted infections (STIs)  · Wear helmets, lifejackets, and protective gear  Always wear a helmet when you ride a bike or motorcycle, go skiing, or play sports that could cause a head injury  Wear protective equipment when you play sports   Wear a lifejacket when you are on a boat or doing water sports  CARE AGREEMENT:   You have the right to help plan your care  Learn about your health condition and how it may be treated  Discuss treatment options with your healthcare providers to decide what care you want to receive  You always have the right to refuse treatment  The above information is an  only  It is not intended as medical advice for individual conditions or treatments  Talk to your doctor, nurse or pharmacist before following any medical regimen to see if it is safe and effective for you  © Copyright [employer] 2021 Information is for End User's use only and may not be sold, redistributed or otherwise used for commercial purposes   All illustrations and images included in CareNotes® are the copyrighted property of A D A M , Inc  or [address] [place] [address]

## 2022-02-07 NOTE — PROGRESS NOTES
Assessment/Plan:      Diagnoses and all orders for this visit:    Essential hypertension    Encounter for immunization    Screening for colorectal cancer  -     Cologuard; Future    Encounter for screening mammogram for breast cancer  -     Mammo screening bilateral w 3d & cad; Future    Screening for cervical cancer  -     Ambulatory referral to Obstetrics / Gynecology; Future    Seasonal allergic rhinitis due to other allergic trigger    Acquired hypothyroidism  -     TSH, 3rd generation; Future    Psoriasis with arthropathy (HCC)    Polymyalgia (HCC)    Pain in both hands    Vitamin D deficiency  -     Vitamin D 25 hydroxy; Future    Vitamin B12 deficiency  -     Vitamin B12; Future    Screening for hyperlipidemia  -     Lipid panel; Future    Screening for diabetes mellitus  -     Comprehensive metabolic panel; Future  -     Hemoglobin A1C; Future  -     Insulin, fasting; Future    Screening for deficiency anemia  -     CBC and differential; Future    Other orders  -     Discontinue: Apremilast 10 & 20 & 30 MG TBPK;   Start: 01/17/22 15:10:00 EST, See Instructions, Disp# 1 packet, Refills: 0, 10 mg BID x 1 days, then 10 mg in AM and 20 mg in PM x 1 days, then 20 mg BID x 1 day, then 30 mg in AM and 20 mg in PM x 1 day, then 30 mg BID thereafter, Note to Pharmacy: A  (Patient not taking: Reported on 2/7/2022)  -     Discontinue: Apremilast 30 MG TABS; 30 mg (Patient not taking: Reported on 2/7/2022 )  -     VITAMIN D PO; Take 1 tablet by mouth in the morning  -     Multiple Vitamins-Minerals (ZINC PO); Take 1 tablet by mouth in the morning          Subjective:     Patient ID: Elena Veronica is a 62 y o  female  Patient presents to office for follow up and recheck  Complete medical history and medications reviewed with patient and tolerating all medications well without any problems  Vital signs reviewed and stable   Continues to be followed by [facility] for Hx Psoriatic Arthritis which Rheumatology prescribed her to start [address] [place] [address]  Patient is currently taking the Methotrexate tablets as ordered by [facility] but this will be discontinued when she starts the [address] [place] [address]  Denies any other problems or concerns at the present time  Review of Systems    GENERAL:  Feels well, denies any significant changes in weight without trying  SKIN:  Denies rashes, lesions, opened areas, wounds, change in moles or any other skin changes  HEENT:  Denies any head injury or headaches  Negative blurred vision, floaters, spots before eyes, infections, or other vision problems  Negative significant changes in vision or hearing  Negative tinnitus, vertigo, or infections  Negative hay fever, sinus trouble, nasal discharge, bloody noses, or problems with smell  Negative sore throat, bleeding gums, ulcers, or sores  NECK:  Denies lumps, goiter, pain, swollen glands, or lymphadenopathy  BREASTS:  Denies lumps, pain, nipple discharge, swelling, redness, or any other changes  RESPIRATORY:  Denies cough, wheezing, shortness of breath, dyspnea, or orthopnea  CARDIOVASCULAR:  Denies chest pain or palpitations  GASTROINTESTINAL:  Appetite good, denies nausea, vomiting, or indigestion  Bowel movements normal occurring about once daily or every other day  URINARY:  Denies frequency, urgency, incontinence, dysuria, hematuria, nocturia, or recent flank pain  GENITAL:  Denies vaginal discharge, pelvic infection, lesions, ulcers, or pain  Negative dyspareunia or abnormal vaginal bleeding  PERIPHERAL VASCULAR:  Denies varicosities, swelling, skin changes, or pain  MUSCULOSKELETAL:  Denies back, joint, or muscle pain  Negative problems with mobility or movement  PSYCHIATRIC:  Denies problems with depression, anxiety, anger, or other psychiatric symptoms  NEUROLOGIC:  Denies fainting, dizziness, memory problems, seizures, tingling, motor or sensory loss     HEMATOLOGIC:  Denies easy bruising, bleeding, or anemia  ENDOCRINE:  Denies thyroid problems, temperature intolerance, excessive sweating, or other endocrine symptoms  Objective:     Physical Exam  Vitals and nursing note reviewed  GENERAL:  Appears well nourished, well groomed, in no acute distress  SKIN:  Palms warm, dry, color good  Nails without clubbing or cyanosis  No lesions, ulcerations, or wounds  HEAD:  Hair is average texture  Scalp without lesions, normocephalic, and atraumatic  EYES:  Visual fields full by confrontation  Conjunctiva pink, sclera white, PERRLA  EARS:  B/L ear canals clear  B/L TMs clear with + light reflex  NOSE: Mucosa pink, moist, septum midline  Negative sinus tenderness  B/L turbinates pink, moist, non-edematous without exudate  MOUTH:  Oral mucosa pink  Pharynx pink, moist, without swelling, redness, or exudate  Dentition ok  Tongue midline  NECK:  Supple, trachea midline, Negative thyromegaly, lymphadenopathy, or swollen glands  LYMPH NODES:  Negative enlargement of neck, axillary, epitrochlear, or inguinal nodes  THORAX/LUNGS  Thorax symmetric with good excursion  Lungs resonant  Breath sounds vesicular with no added sounds  Diaphragm descends within normal limits  CARDIOVASCULAR:  Carotid upstrokes brisk and without bruits  Apical impulse discrete and tapping, barely palpable in the 5th ICS/MCL  Normal S1 and Normal S2, Negative S3 or S4  Negative murmurs, thrills, lifts, or heaves  ABDOMEN:  Protuberant, bowel sounds normal active x 4 quadrants  Negative tenderness  Negative masses  Negative hepatomegaly  Negative splenomegaly  Negative costovertebral tenderness  EXTREMITIES:  Warm, calves supple, non-tender, negative for edema  Negative stasis pigmentation or ulcers  +2 pulses throughout  MUSCULOSKELETAL:  Negative joint deformities  Good range of motion in hands, wrists, elbows, shoulders, spine, hips, knees, and ankles    NEUROLOGICAL:  Mental status: Awake, alert, and oriented to person, place, time, and event  Normal thought processes  Damien García BMI Counseling: Body mass index is 33 9 kg/m²  The BMI is above normal  Nutrition recommendations include decreasing portion sizes, encouraging healthy choices of fruits and vegetables, decreasing fast food intake, consuming healthier snacks, limiting drinks that contain sugar, moderation in carbohydrate intake, increasing intake of lean protein, reducing intake of saturated and trans fat and reducing intake of cholesterol  Exercise recommendations include exercising 3-5 times per week  No pharmacotherapy was ordered  Rationale for BMI follow-up plan is due to patient being overweight or obese  Depression Screening and Follow-up Plan: Patient was screened for depression during today's encounter  They screened negative with a PHQ-2 score of 0

## 2022-06-28 ENCOUNTER — TELEPHONE (OUTPATIENT)
Dept: FAMILY MEDICINE CLINIC | Facility: CLINIC | Age: 62
End: 2022-06-28

## 2022-06-28 NOTE — TELEPHONE ENCOUNTER
Pt called and said she spoke to two people regarding billing issues she spoke to some one name Miki Walls from central billing  Pt said she came for an visit on 02/07/22 for an physical and she was told that the reason she got billed is because we went over her medication, then she was told that the billing code needed to be changed  Please review

## 2022-06-29 NOTE — TELEPHONE ENCOUNTER
I reviewed her chart and it looks like she had her annual wellness visit 6/22/2021 so her 2/7/22 visit was a follow up and recheck visit that I coded [phone] for a lower billing code  The insurance will not cover her 2/7/22 visit as an annual wellness being it was not a full year so her next wellness visit would be due after 6/22/22

## 2022-07-29 LAB — HBA1C MFR BLD HPLC: 5.9 %

## 2022-08-08 ENCOUNTER — OFFICE VISIT (OUTPATIENT)
Dept: FAMILY MEDICINE CLINIC | Facility: CLINIC | Age: 62
End: 2022-08-08
Payer: COMMERCIAL

## 2022-08-08 VITALS
OXYGEN SATURATION: 97 % | WEIGHT: 191.2 LBS | DIASTOLIC BLOOD PRESSURE: 70 MMHG | TEMPERATURE: 97 F | HEIGHT: 63 IN | RESPIRATION RATE: 18 BRPM | BODY MASS INDEX: 33.88 KG/M2 | SYSTOLIC BLOOD PRESSURE: 130 MMHG | HEART RATE: 94 BPM

## 2022-08-08 DIAGNOSIS — Z13.1 SCREENING FOR DIABETES MELLITUS: ICD-10-CM

## 2022-08-08 DIAGNOSIS — I10 ESSENTIAL HYPERTENSION: ICD-10-CM

## 2022-08-08 DIAGNOSIS — Z13.0 SCREENING FOR DEFICIENCY ANEMIA: ICD-10-CM

## 2022-08-08 DIAGNOSIS — J01.00 ACUTE NON-RECURRENT MAXILLARY SINUSITIS: ICD-10-CM

## 2022-08-08 DIAGNOSIS — Z13.220 SCREENING FOR HYPERLIPIDEMIA: ICD-10-CM

## 2022-08-08 DIAGNOSIS — Z86.16 HISTORY OF 2019 NOVEL CORONAVIRUS DISEASE (COVID-19): ICD-10-CM

## 2022-08-08 DIAGNOSIS — Z00.00 ENCOUNTER FOR ANNUAL PHYSICAL EXAM: Primary | ICD-10-CM

## 2022-08-08 DIAGNOSIS — J30.89 SEASONAL ALLERGIC RHINITIS DUE TO OTHER ALLERGIC TRIGGER: ICD-10-CM

## 2022-08-08 DIAGNOSIS — Z13.29 SCREENING FOR THYROID DISORDER: ICD-10-CM

## 2022-08-08 DIAGNOSIS — R06.2 WHEEZING: ICD-10-CM

## 2022-08-08 DIAGNOSIS — E55.9 VITAMIN D DEFICIENCY: ICD-10-CM

## 2022-08-08 DIAGNOSIS — E53.8 VITAMIN B12 DEFICIENCY: ICD-10-CM

## 2022-08-08 PROCEDURE — 99396 PREV VISIT EST AGE 40-64: CPT | Performed by: NURSE PRACTITIONER

## 2022-08-08 PROCEDURE — 3075F SYST BP GE 130 - 139MM HG: CPT | Performed by: NURSE PRACTITIONER

## 2022-08-08 PROCEDURE — 3078F DIAST BP <80 MM HG: CPT | Performed by: NURSE PRACTITIONER

## 2022-08-08 RX ORDER — LOSARTAN POTASSIUM AND HYDROCHLOROTHIAZIDE 12.5; 1 MG/1; MG/1
1 TABLET ORAL DAILY
Qty: 90 TABLET | Refills: 2 | Status: SHIPPED | OUTPATIENT
Start: 2022-08-08

## 2022-08-08 RX ORDER — METHOCARBAMOL 750 MG/1
50000 TABLET ORAL
Qty: 3 CAPSULE | Refills: 2 | Status: SHIPPED | OUTPATIENT
Start: 2022-08-08

## 2022-08-08 RX ORDER — ALBUTEROL SULFATE 90 UG/1
2 AEROSOL, METERED RESPIRATORY (INHALATION) EVERY 6 HOURS PRN
Qty: 18 G | Refills: 3 | Status: SHIPPED | OUTPATIENT
Start: 2022-08-08

## 2022-08-08 RX ORDER — FLUTICASONE PROPIONATE 50 MCG
SPRAY, SUSPENSION (ML) NASAL
Qty: 16 G | Refills: 5 | Status: SHIPPED | OUTPATIENT
Start: 2022-08-08

## 2022-08-08 RX ORDER — ZINC GLUCONATE 50 MG
50 TABLET ORAL DAILY
COMMUNITY

## 2022-08-08 RX ORDER — CETIRIZINE HYDROCHLORIDE 10 MG/1
10 TABLET ORAL DAILY
Qty: 30 TABLET | Refills: 5 | Status: SHIPPED | OUTPATIENT
Start: 2022-08-08

## 2022-08-08 RX ORDER — APREMILAST 30 MG/1
TABLET, FILM COATED ORAL
COMMUNITY
Start: 2022-06-20

## 2022-08-08 RX ORDER — PREDNISONE 20 MG/1
TABLET ORAL
Qty: 30 TABLET | Refills: 1 | Status: SHIPPED | OUTPATIENT
Start: 2022-08-08

## 2022-08-08 RX ORDER — AZITHROMYCIN 250 MG/1
TABLET, FILM COATED ORAL
Qty: 6 TABLET | Refills: 1 | Status: SHIPPED | OUTPATIENT
Start: 2022-08-08 | End: 2022-08-13

## 2022-08-08 RX ORDER — METOPROLOL SUCCINATE 50 MG/1
50 TABLET, EXTENDED RELEASE ORAL DAILY
Qty: 90 TABLET | Refills: 2 | Status: SHIPPED | OUTPATIENT
Start: 2022-08-08

## 2022-08-08 NOTE — PATIENT INSTRUCTIONS

## 2022-08-08 NOTE — PROGRESS NOTES
Assessment/Plan:      Diagnoses and all orders for this visit:    Encounter for annual physical exam    Acute non-recurrent maxillary sinusitis  -     predniSONE 20 mg tablet; 1 tab po 3 x daily x 3 days then 1 tab po 2 x daily x 3 days then 1 tab po daily x 3 days with food  -     azithromycin (Zithromax) 250 mg tablet; Take 2 tablets (500 mg total) by mouth daily for 1 day, THEN 1 tablet (250 mg total) daily for 4 days  -     cetirizine (ZyrTEC) 10 mg tablet; Take 1 tablet (10 mg total) by mouth daily    Seasonal allergic rhinitis due to other allergic trigger  -     predniSONE 20 mg tablet; 1 tab po 3 x daily x 3 days then 1 tab po 2 x daily x 3 days then 1 tab po daily x 3 days with food  -     azithromycin (Zithromax) 250 mg tablet; Take 2 tablets (500 mg total) by mouth daily for 1 day, THEN 1 tablet (250 mg total) daily for 4 days  -     cetirizine (ZyrTEC) 10 mg tablet; Take 1 tablet (10 mg total) by mouth daily  -     fluticasone (FLONASE) 50 mcg/act nasal spray; 1 spray each nostril 2 x daily x 1 month then prn congestion or allergies    Essential hypertension  -     losartan-hydrochlorothiazide (HYZAAR) 100-12 5 MG per tablet; Take 1 tablet by mouth daily  -     metoprolol succinate (TOPROL-XL) 50 mg 24 hr tablet; Take 1 tablet (50 mg total) by mouth daily    History of 2019 novel coronavirus disease (COVID-19)  -     albuterol (PROVENTIL HFA,VENTOLIN HFA) 90 mcg/act inhaler; Inhale 2 puffs every 6 (six) hours as needed for wheezing or shortness of breath    Wheezing  -     albuterol (PROVENTIL HFA,VENTOLIN HFA) 90 mcg/act inhaler; Inhale 2 puffs every 6 (six) hours as needed for wheezing or shortness of breath    Vitamin D deficiency  -     Cholecalciferol (D3-50) 1 25 MG (42708 UT) capsule; Take 1 capsule (50,000 Units total) by mouth every 28 days  -     Vitamin D 25 hydroxy;  Future    Screening for deficiency anemia  -     CBC and differential; Future    Screening for diabetes mellitus  - Hemoglobin A1C; Future  -     Insulin, fasting; Future  -     Comprehensive metabolic panel; Future    Screening for thyroid disorder  -     TSH, 3rd generation; Future    Screening for hyperlipidemia  -     Lipid panel; Future    Vitamin B12 deficiency  -     Vitamin B12; Future    Other orders  -     Otezla 30 MG TABS; TAKE 1 TABLET BY MOUTH TWICE DAILY  TO START AFTER FINISHING STARTER PACK  -     zinc gluconate 50 mg tablet; Take 50 mg by mouth daily          Subjective:     Patient ID: Ada Fiore is a 62 y o  female  Patient presents to office for follow up and recheck  Complete medical history and medications reviewed with patient and tolerating all medications well without any problems  Vital signs reviewed and stable  Lab results reviewed with patient  C/O allergy symptoms with sinus pressure with itchy watery eyes, PND, and running nose for clear mucous  Patient has been using her Albuterol Inhaler for the past 2 days due to chest tightness  Patient continues to be followed by [facility] for Rheumatoid Arthritis  Denies any new problems or concerns at the present time  Review of Systems    GENERAL:  Feels well, denies any significant changes in weight without trying  SKIN:  Denies rashes, lesions, opened areas, wounds, change in moles or any other skin changes  HEENT:  Denies any head injury or headaches  Negative blurred vision, floaters, spots before eyes, infections, or other vision problems  Negative significant changes in vision or hearing  Negative tinnitus, vertigo, or infections  C/O sinus pressure and allergy symptoms  NECK:  Denies lumps, goiter, pain, swollen glands, or lymphadenopathy  BREASTS:  Denies lumps, pain, nipple discharge, swelling, redness, or any other changes  RESPIRATORY: Using Albuterol Inhaler for chest tightness  CARDIOVASCULAR:  Denies chest pain or palpitations  GASTROINTESTINAL:  Appetite good, denies nausea, vomiting, or indigestion  Bowel movements normal occurring about once daily or every other day  URINARY:  Denies frequency, urgency, incontinence, dysuria, hematuria, nocturia, or recent flank pain  GENITAL:  Denies vaginal discharge, pelvic infection, lesions, ulcers, or pain  Negative dyspareunia or abnormal vaginal bleeding  PERIPHERAL VASCULAR:  Denies varicosities, swelling, skin changes, or pain  MUSCULOSKELETAL:  Denies back, joint, or muscle pain  Negative problems with mobility or movement  PSYCHIATRIC:  Denies problems with depression, anxiety, anger, or other psychiatric symptoms  NEUROLOGIC:  Denies fainting, dizziness, memory problems, seizures, tingling, motor or sensory loss  HEMATOLOGIC:  Denies easy bruising, bleeding, or anemia  ENDOCRINE:  Denies thyroid problems, temperature intolerance, excessive sweating, or other endocrine symptoms  Objective:     Physical Exam  Vitals and nursing note reviewed  GENERAL:  Appears well nourished, well groomed, in no acute distress  SKIN:  Palms warm, dry, color good  Nails without clubbing or cyanosis  No lesions, ulcerations, or wounds  HEAD:  Hair is average texture  Scalp without lesions, normocephalic, and atraumatic  EARS:  B/L ear canals clear  B/L TMs red with decreased light reflex  NOSE: Mucosa pink, moist, septum midline  Negative sinus tenderness  B/L turbinates red and edematous with yellow exudate  MOUTH:  Oral mucosa pink  Pharynx pink, moist, without swelling, redness, or exudate  Dentition ok  Tongue midline  NECK:  Supple, trachea midline, Negative thyromegaly, lymphadenopathy, or swollen glands  LYMPH NODES:  Negative enlargement of neck, axillary, epitrochlear, or inguinal nodes  THORAX/LUNGS  Thorax symmetric with good excursion  Lungs resonant  Breath sounds vesicular with no added sounds  Diaphragm descends within normal limits  CARDIOVASCULAR:  Carotid upstrokes brisk and without bruits     Apical impulse discrete and tapping, barely palpable in the 5th ICS/MCL  Normal S1 and Normal S2, Negative S3 or S4  Negative murmurs, thrills, lifts, or heaves  ABDOMEN:  Protuberant, bowel sounds normal active x 4 quadrants  Negative tenderness  Negative masses  Negative hepatomegaly  Negative splenomegaly  Negative costovertebral tenderness  EXTREMITIES:  Warm, calves supple, non-tender, negative for edema  Negative stasis pigmentation or ulcers  +2 pulses throughout  MUSCULOSKELETAL:  Negative joint deformities  Good range of motion in hands, wrists, elbows, shoulders, spine, hips, knees, and ankles  NEUROLOGICAL:  Mental status:  Awake, alert, and oriented to person, place, time, and event  Normal thought processes

## 2022-12-11 NOTE — PROGRESS NOTES
Assessment/Plan:    Essential hypertension  BP at goal   Split dose in half and administer BID  Diagnoses and all orders for this visit:    Acquired hypothyroidism    Encounter for immunization    Essential hypertension    Other orders  -     Cancel: PNEUMOCOCCAL POLYSACCHARIDE VACCINE 23-VALENT =>3YO SQ IM  -     Cancel: TDAP VACCINE GREATER THAN OR EQUAL TO 6YO IM          Subjective:      Patient ID: Tomas Painting is a 62 y o  female  Please see previous note  She had COVID-19 back in December  She had extended symptoms, including chronic cough  She was on low dose lisinopril at the time  Her BP was not at goal then, either  Her BP is now at goal and her cough has resolved  She has no F/C  She has no constitutional sx's  Her TSH is at goal   She has no hypo or hyperthyroidism  The following portions of the patient's history were reviewed and updated as appropriate:   She  has a past medical history of Allergic, Asthma, Bilateral ovarian cysts, Disease of thyroid gland, Eczema, Familial cavernous cerebral angioma ([address] ), Fibromyalgia, Hypertension, Insomnia, MI (myocardial infarction) ([address] ), Migraines, Osteoarthritis, Polyarthropathy, Polymyalgia ([address] ), Psoriasis, TIA (transient ischemic attack), and Vitamin deficiency  She   Patient Active Problem List    Diagnosis Date Noted    History of 2019 novel coronavirus disease (COVID-19) 01/05/2021    Psoriasis with arthropathy ([address] ) 11/23/2018    Pain in both hands 04/18/2018    Polyarthralgia 04/18/2018    Polymyalgia ([address] ) 04/18/2018    Allergic rhinitis due to allergen 04/18/2018    Insulin resistance 04/18/2018    Acquired hypothyroidism 04/18/2018    Essential hypertension 04/18/2018    Vitamin D deficiency 04/18/2018     She  has a past surgical history that includes Endometrial ablation w/ novasure (Bilateral, 2010) and Dilation and curettage of uterus  Her family history includes Anxiety disorder in her sister;  Arthritis in her maternal grandmother; Asthma in her sister; Cancer in her mother; Crohn's disease in her sister; Depression in her sister; Heart disease in her maternal grandmother; Hyperlipidemia in her father; Rheum arthritis in her mother and sister; Sjogren's syndrome in her daughter; Stroke in her maternal grandmother; Valvular heart disease in her daughter and paternal grandfather  She  reports that she quit smoking about 32 years ago  Her smoking use included cigarettes  She has never used smokeless tobacco  She reports current alcohol use  She reports that she does not use drugs    Current Outpatient Medications   Medication Sig Dispense Refill    albuterol (PROVENTIL HFA,VENTOLIN HFA) 90 mcg/act inhaler Inhale 2 puffs every 6 (six) hours as needed for wheezing or shortness of breath 18 g 3    Ascorbic Acid (VITAMIN C) 1000 MG tablet Take 1 tablet (1,000 mg total) by mouth daily 90 tablet 1    betamethasone dipropionate (DIPROSONE) 0 05 % cream Apply topically 2 (two) times a day 30 g 0    calcipotriene (DOVONOX) 0 005 % ointment Apply topically 2 (two) times a day 60 g 5    Cholecalciferol (D3-50) 1 25 MG (77892 UT) capsule Take 1 capsule (50,000 Units total) by mouth daily 12 capsule 3    Cyanocobalamin (Vitamin B-12) 2500 MCG SUBL Place 1 tablet (2,500 mcg total) under the tongue every other day 90 each 1    diclofenac (VOLTAREN) 75 mg EC tablet Take 75 mg by mouth 2 (two) times a day      fexofenadine (ALLEGRA) 60 MG tablet TAKE 1 TABLET BY MOUTH ONCE DAILY AS NEEDED (ALLERGIES) 90 tablet 0    fluticasone (FLONASE) 50 mcg/act nasal spray 1 spray each nostril 2 x daily x 1 month then prn congestion or allergies 1 Bottle 5    folic acid (FOLVITE) 1 mg tablet Take 1 mg by mouth daily      glucosamine-chondroitin 500-400 MG tablet Take 1 tablet by mouth 3 (three) times a day 60 tablet 0    levothyroxine 75 mcg tablet Take 1 tablet (75 mcg total) by mouth daily 90 tablet 3    losartan-hydrochlorothiazide (HYZAAR) 100-12 5 MG per tablet Take 1 tablet by mouth daily 90 tablet 3    methotrexate 2 5 mg tablet Take by mouth once a week 6 tabs po once weekly on Friday      metoprolol succinate (TOPROL-XL) 50 mg 24 hr tablet Take 1 tablet (50 mg total) by mouth daily 90 tablet 3    solifenacin (VESIcare) 5 mg tablet Take by mouth       No current facility-administered medications for this visit       Current Outpatient Medications on File Prior to Visit   Medication Sig    albuterol (PROVENTIL HFA,VENTOLIN HFA) 90 mcg/act inhaler Inhale 2 puffs every 6 (six) hours as needed for wheezing or shortness of breath    Ascorbic Acid (VITAMIN C) 1000 MG tablet Take 1 tablet (1,000 mg total) by mouth daily    betamethasone dipropionate (DIPROSONE) 0 05 % cream Apply topically 2 (two) times a day    calcipotriene (DOVONOX) 0 005 % ointment Apply topically 2 (two) times a day    Cholecalciferol (D3-50) 1 25 MG (41613 UT) capsule Take 1 capsule (50,000 Units total) by mouth daily    Cyanocobalamin (Vitamin B-12) 2500 MCG SUBL Place 1 tablet (2,500 mcg total) under the tongue every other day    diclofenac (VOLTAREN) 75 mg EC tablet Take 75 mg by mouth 2 (two) times a day    fexofenadine (ALLEGRA) 60 MG tablet TAKE 1 TABLET BY MOUTH ONCE DAILY AS NEEDED (ALLERGIES)    fluticasone (FLONASE) 50 mcg/act nasal spray 1 spray each nostril 2 x daily x 1 month then prn congestion or allergies    folic acid (FOLVITE) 1 mg tablet Take 1 mg by mouth daily    glucosamine-chondroitin 500-400 MG tablet Take 1 tablet by mouth 3 (three) times a day    levothyroxine 75 mcg tablet Take 1 tablet (75 mcg total) by mouth daily    losartan-hydrochlorothiazide (HYZAAR) 100-12 5 MG per tablet Take 1 tablet by mouth daily    methotrexate 2 5 mg tablet Take by mouth once a week 6 tabs po once weekly on Friday    metoprolol succinate (TOPROL-XL) 50 mg 24 hr tablet Take 1 tablet (50 mg total) by mouth daily    solifenacin (VESIcare) 5 mg tablet Take by mouth     No current facility-administered medications on file prior to visit  She has No Known Allergies       Review of Systems   All other systems reviewed and are negative  Objective:      /68   Pulse 88   Temp 98 1 °F (36 7 °C)   Ht 5' 3" (1 6 m)   Wt 87 1 kg (192 lb)   SpO2 98%   BMI 34 01 kg/m²          Physical Exam  Vitals and nursing note reviewed  Constitutional:       Appearance: Normal appearance  She is obese  HENT:      Head: Normocephalic and atraumatic  Cardiovascular:      Rate and Rhythm: Normal rate and regular rhythm  Pulses: Normal pulses  Heart sounds: Normal heart sounds  Pulmonary:      Effort: Pulmonary effort is normal       Breath sounds: Normal breath sounds  Abdominal:      General: Abdomen is flat  Bowel sounds are normal       Palpations: Abdomen is soft  Musculoskeletal:         General: Normal range of motion  Cervical back: Normal range of motion and neck supple  Skin:     General: Skin is warm and dry  Capillary Refill: Capillary refill takes less than 2 seconds  Neurological:      General: No focal deficit present  Mental Status: She is alert and oriented to person, place, and time  Mental status is at baseline  Psychiatric:         Mood and Affect: Mood normal          Behavior: Behavior normal          Thought Content:  Thought content normal          Judgment: Judgment normal  no

## 2023-01-31 LAB — HBA1C MFR BLD HPLC: NORMAL %

## 2023-02-06 ENCOUNTER — OFFICE VISIT (OUTPATIENT)
Dept: FAMILY MEDICINE CLINIC | Facility: CLINIC | Age: 63
End: 2023-02-06

## 2023-02-06 VITALS
WEIGHT: 199 LBS | HEART RATE: 72 BPM | DIASTOLIC BLOOD PRESSURE: 57 MMHG | BODY MASS INDEX: 35.25 KG/M2 | SYSTOLIC BLOOD PRESSURE: 120 MMHG | TEMPERATURE: 98.5 F | RESPIRATION RATE: 18 BRPM | OXYGEN SATURATION: 100 %

## 2023-02-06 DIAGNOSIS — Z13.29 SCREENING FOR THYROID DISORDER: ICD-10-CM

## 2023-02-06 DIAGNOSIS — M35.3 POLYMYALGIA (HCC): Primary | ICD-10-CM

## 2023-02-06 DIAGNOSIS — G89.29 CHRONIC INTRACTABLE HEADACHE, UNSPECIFIED HEADACHE TYPE: ICD-10-CM

## 2023-02-06 DIAGNOSIS — H43.393 VITREOUS FLOATERS OF BOTH EYES: ICD-10-CM

## 2023-02-06 DIAGNOSIS — J30.89 SEASONAL ALLERGIC RHINITIS DUE TO OTHER ALLERGIC TRIGGER: ICD-10-CM

## 2023-02-06 DIAGNOSIS — R73.9 HYPERGLYCEMIA: ICD-10-CM

## 2023-02-06 DIAGNOSIS — R51.9 CHRONIC INTRACTABLE HEADACHE, UNSPECIFIED HEADACHE TYPE: ICD-10-CM

## 2023-02-06 DIAGNOSIS — E55.9 VITAMIN D DEFICIENCY: ICD-10-CM

## 2023-02-06 DIAGNOSIS — H53.8 BLURRED VISION, BILATERAL: ICD-10-CM

## 2023-02-06 DIAGNOSIS — Z13.0 SCREENING FOR DEFICIENCY ANEMIA: ICD-10-CM

## 2023-02-06 DIAGNOSIS — L40.50 PSORIASIS WITH ARTHROPATHY (HCC): ICD-10-CM

## 2023-02-06 DIAGNOSIS — J01.00 ACUTE NON-RECURRENT MAXILLARY SINUSITIS: ICD-10-CM

## 2023-02-06 DIAGNOSIS — E03.9 ACQUIRED HYPOTHYROIDISM: ICD-10-CM

## 2023-02-06 DIAGNOSIS — Z13.220 SCREENING FOR HYPERLIPIDEMIA: ICD-10-CM

## 2023-02-06 DIAGNOSIS — I10 ESSENTIAL HYPERTENSION: ICD-10-CM

## 2023-02-06 DIAGNOSIS — Z78.0 POSTMENOPAUSE: ICD-10-CM

## 2023-02-06 DIAGNOSIS — E53.8 VITAMIN B12 DEFICIENCY: ICD-10-CM

## 2023-02-06 DIAGNOSIS — R05.3 CHRONIC COUGH: ICD-10-CM

## 2023-02-06 DIAGNOSIS — M85.80 OSTEOPENIA, UNSPECIFIED LOCATION: ICD-10-CM

## 2023-02-06 DIAGNOSIS — D18.02 VENOUS ANGIOMA OF BRAIN (HCC): ICD-10-CM

## 2023-02-06 DIAGNOSIS — Z12.31 ENCOUNTER FOR SCREENING MAMMOGRAM FOR MALIGNANT NEOPLASM OF BREAST: ICD-10-CM

## 2023-02-06 RX ORDER — THIAMINE HCL 100 MG
TABLET ORAL
Qty: 90 TABLET | Refills: 1 | Status: SHIPPED | OUTPATIENT
Start: 2023-02-06

## 2023-02-06 RX ORDER — AMOXICILLIN AND CLAVULANATE POTASSIUM 875; 125 MG/1; MG/1
1 TABLET, FILM COATED ORAL 2 TIMES DAILY
Qty: 20 TABLET | Refills: 2 | Status: SHIPPED | OUTPATIENT
Start: 2023-02-06 | End: 2023-02-16

## 2023-02-06 NOTE — PROGRESS NOTES
Name: Francisco Jacobo      : 1960      MRN: [id number]  Encounter Provider: ROBBI Saucedo  Encounter Date: 2023   Encounter department: [address] [place] [address]     1  Polymyalgia ([place] [address] )    2  Essential hypertension    3  Acquired hypothyroidism    4  Seasonal allergic rhinitis due to other allergic trigger    5  Vitamin D deficiency  -     Vitamin D 25 hydroxy; Future; Expected date: 2023    6  Psoriasis with arthropathy ([place] [address] )    7  Chronic cough  -     XR chest pa & lateral; Future; Expected date: 2023    8  Hyperglycemia  -     Hemoglobin A1C; Future; Expected date: 2023  -     Comprehensive metabolic panel; Future; Expected date: 2023  -     Hemoglobin A1C; Future; Expected date: 2023  -     Insulin, fasting; Future; Expected date: 2023    9  Screening for deficiency anemia  -     CBC and differential; Future; Expected date: 2023    10  Screening for hyperlipidemia  -     Lipid panel; Future; Expected date: 2023    11  Screening for thyroid disorder  -     TSH, 3rd generation; Future; Expected date: 2023    12  Vitamin B12 deficiency  -     Vitamin B12; Future; Expected date: 2023  -     Cyanocobalamin (Vitamin B-12) 2500 MCG SUBL; 1 tab po every 3rd day    13  Encounter for screening mammogram for malignant neoplasm of breast  -     Mammo screening bilateral w 3d & cad; Future; Expected date: 2023    14  Postmenopause  -     DXA bone density spine hip and pelvis; Future; Expected date: 2023    15  Osteopenia, unspecified location  -     DXA bone density spine hip and pelvis; Future; Expected date: 2023    16  Chronic intractable headache, unspecified headache type  -     MRI brain w wo contrast mra head wo; Future; Expected date: 2023    17  Venous angioma of brain ([place] [address] )  -     MRI brain w wo contrast mra head wo; Future; Expected date: 2023    18   Vitreous floaters of both eyes  -     MRI brain w wo contrast mra head wo; Future; Expected date: 02/06/2023    19  Blurred vision, bilateral  -     MRI brain w wo contrast mra head wo; Future; Expected date: 02/06/2023    20  Acute non-recurrent maxillary sinusitis  -     amoxicillin-clavulanate (AUGMENTIN) 875-125 mg per tablet; Take 1 tablet by mouth 2 (two) times a day for 10 days      BMI Counseling: Body mass index is 35 25 kg/m²  The BMI is above normal  Nutrition recommendations include decreasing portion sizes, encouraging healthy choices of fruits and vegetables, decreasing fast food intake, consuming healthier snacks, limiting drinks that contain sugar, moderation in carbohydrate intake, increasing intake of lean protein, reducing intake of saturated and trans fat and reducing intake of cholesterol  Exercise recommendations include exercising 3-5 times per week  No pharmacotherapy was ordered  Rationale for BMI follow-up plan is due to patient being overweight or obese  Subjective     Patient presents to office for follow up and recheck  Complete medical history and medications reviewed with patient and tolerating all medications well without any problems  Vital signs reviewed and stable  Lab results reviewed with patient  Continues to be followed by [facility] for Hx Psoriatic Arthritis  C/O sinus pressure with sinus swelling and congestion occurring  C/O constant cough ongoing since she was diagnosed the COVID  Patient states she was diagnosed with a Cerebral Venous Angioma which she was getting routine MRI and MRA of Brain completed  However, patient has not had any follow up MRI or MRA of Brain completed  Patient states she has daily headache, vision changes with floaters daily, and has concentration deficit  Denies any new problems or concerns at the present time  Review of Systems  GENERAL:  Feels well, denies any significant changes in weight without trying    SKIN:  Denies rashes, lesions, opened areas, wounds, change in moles or any other skin changes  HEENT:  Denies any head injury  C/O daily headaches occurring  C/O sinus pressure, sinus congestion, and sinus swelling  C/O blurred vision with floaters occurring  NECK:  Denies lumps, goiter, pain, swollen glands, or lymphadenopathy  BREASTS:  Denies lumps, pain, nipple discharge, swelling, redness, or any other changes  RESPIRATORY: C/O chronic cough ongoing since having COVID, no wheezing, shortness of breath, dyspnea, or orthopnea  CARDIOVASCULAR:  Denies chest pain or palpitations  GASTROINTESTINAL:  Appetite good, denies nausea, vomiting, or indigestion  Bowel movements normal occurring about once daily or every other day  URINARY:  Denies frequency, urgency, incontinence, dysuria, hematuria, nocturia, or recent flank pain  GENITAL:  Denies vaginal discharge, pelvic infection, lesions, ulcers, or pain  Negative dyspareunia or abnormal vaginal bleeding  PERIPHERAL VASCULAR:  Denies varicosities, swelling, skin changes, or pain  MUSCULOSKELETAL:  Denies back, joint, or muscle pain  Negative problems with mobility or movement  PSYCHIATRIC:  Denies problems with depression, anxiety, anger, or other psychiatric symptoms  NEUROLOGIC:  Denies fainting, dizziness, memory problems, seizures, tingling, motor or sensory loss  HEMATOLOGIC:  Denies easy bruising, bleeding, or anemia  ENDOCRINE:  Denies thyroid problems, temperature intolerance, excessive sweating, or other endocrine symptoms       Past Medical History:   Diagnosis Date   • Allergic    • Asthma    • Bilateral ovarian cysts    • Disease of thyroid gland     HYPOTHYROID   • Eczema    • Familial cavernous cerebral angioma (HCC)    • Fibromyalgia    • Hypertension    • Insomnia    • MI (myocardial infarction) ([facility] )    • Migraines    • Osteoarthritis    • Polyarthropathy    • Polymyalgia ([facility] )    • Psoriasis    • TIA (transient ischemic attack)    • Vitamin deficiency      Past Surgical History:   Procedure Laterality Date   • DILATION AND CURETTAGE OF UTERUS      X 2   • ENDOMETRIAL ABLATION W/ NOVASURE Bilateral 2010     Family History   Problem Relation Age of Onset   • Rheum arthritis Mother    • Cancer Mother         BREAST   • Hyperlipidemia Father    • Anxiety disorder Sister    • Rheum arthritis Sister    • Asthma Sister    • Depression Sister    • Crohn's disease Sister    • Sjogren's syndrome Daughter    • Valvular heart disease Daughter    • Arthritis Maternal Grandmother    • Heart disease Maternal Grandmother    • Stroke Maternal Grandmother    • Valvular heart disease Paternal Grandfather      Social History     Socioeconomic History   • Marital status: /Civil Union     Spouse name: None   • Number of children: None   • Years of education: None   • Highest education level: None   Occupational History   • None   Tobacco Use   • Smoking status: Former     Types: Cigarettes     Quit date: 1989     Years since quittin 1   • Smokeless tobacco: Never   Vaping Use   • Vaping Use: Never used   Substance and Sexual Activity   • Alcohol use: Yes     Comment: OCCASIOANLLY   • Drug use: No   • Sexual activity: None   Other Topics Concern   • None   Social History Narrative   • None     Social Determinants of Health     Financial Resource Strain: Not on file   Food Insecurity: Not on file   Transportation Needs: Not on file   Physical Activity: Not on file   Stress: Not on file   Social Connections: Not on file   Intimate Partner Violence: Not on file   Housing Stability: Not on file     Current Outpatient Medications on File Prior to Visit   Medication Sig   • albuterol (PROVENTIL HFA,VENTOLIN HFA) 90 mcg/act inhaler Inhale 2 puffs every 6 (six) hours as needed for wheezing or shortness of breath   • Ascorbic Acid (VITAMIN C) 1000 MG tablet Take 1 tablet (1,000 mg total) by mouth daily   • cetirizine (ZyrTEC) 10 mg tablet Take 1 tablet (10 mg total) by mouth daily   • Cholecalciferol (D3-50) 1 25 MG (98272 UT) capsule Take 1 capsule (50,000 Units total) by mouth every 28 days   • Euthyrox 75 MCG tablet Take 1 tablet by mouth once daily   • fluticasone (FLONASE) 50 mcg/act nasal spray 1 spray each nostril 2 x daily x 1 month then prn congestion or allergies   • losartan-hydrochlorothiazide (HYZAAR) 100-12 5 MG per tablet Take 1 tablet by mouth daily   • metoprolol succinate (TOPROL-XL) 50 mg 24 hr tablet Take 1 tablet (50 mg total) by mouth daily   • Otezla 30 MG TABS TAKE 1 TABLET BY MOUTH TWICE DAILY  TO START AFTER FINISHING STARTER PACK   • zinc gluconate 50 mg tablet Take 50 mg by mouth daily   • [DISCONTINUED] Cyanocobalamin (Vitamin B-12) 2500 MCG SUBL Place 1 tablet (2,500 mcg total) under the tongue every other day   • [DISCONTINUED] predniSONE 20 mg tablet 1 tab po 3 x daily x 3 days then 1 tab po 2 x daily x 3 days then 1 tab po daily x 3 days with food (Patient not taking: Reported on 2/6/2023)     No Known Allergies  Immunization History   Administered Date(s) Administered   • INFLUENZA 10/17/2018, 10/21/2019   • Influenza, seasonal, injectable, preservative free 10/17/2018       Objective     /57 (BP Location: Left arm, Patient Position: Sitting, Cuff Size: Standard)   Pulse 72   Temp 98 5 °F (36 9 °C) (Tympanic)   Resp 18   Wt 90 3 kg (199 lb)   SpO2 100%   BMI 35 25 kg/m²     Physical Exam  Vitals and nursing note reviewed  GENERAL:  Appears well nourished, well groomed, in no acute distress  SKIN:  Palms warm, dry, color good  Nails without clubbing or cyanosis  No lesions, ulcerations, or wounds  HEAD:  Hair is average texture  Scalp without lesions, normocephalic, and atraumatic  EARS:  B/L ear canals clear  B/L TMs clear with + light reflex  NOSE: Mucosa pink, moist, septum midline  + sinus tenderness  B/L turbinates red and edematous with yellow exudate  MOUTH:  Oral mucosa pink    Pharynx pink, moist, without swelling, redness, or exudate  Dentition ok  Tongue midline  NECK:  Supple, trachea midline, Negative thyromegaly, lymphadenopathy, or swollen glands  LYMPH NODES:  Negative enlargement of neck, axillary, epitrochlear, or inguinal nodes  THORAX/LUNGS  Thorax symmetric with good excursion  Lungs resonant  Breath sounds vesicular with no added sounds  Diaphragm descends within normal limits  CARDIOVASCULAR:  Carotid upstrokes brisk and without bruits  Apical impulse discrete and tapping, barely palpable in the 5th ICS/MCL  Normal S1 and Normal S2, Negative S3 or S4  Negative murmurs, thrills, lifts, or heaves  ABDOMEN:  Protuberant, bowel sounds normal active x 4 quadrants  Negative tenderness  Negative masses  Negative hepatomegaly  Negative splenomegaly  Negative costovertebral tenderness  EXTREMITIES:  Warm, calves supple, non-tender, negative for edema  Negative stasis pigmentation or ulcers  +2 pulses throughout  MUSCULOSKELETAL:  Negative joint deformities  Good range of motion in hands, wrists, elbows, shoulders, spine, hips, knees, and ankles  Negative spinal curvature  NEUROLOGICAL:  Mental status:  Awake, alert, and oriented to person, place, time, and event  Normal thought processes           ROBBI Hazel

## 2023-02-06 NOTE — PATIENT INSTRUCTIONS

## 2023-02-17 ENCOUNTER — HOSPITAL ENCOUNTER (OUTPATIENT)
Dept: RADIOLOGY | Facility: CLINIC | Age: 63
End: 2023-02-17

## 2023-02-17 ENCOUNTER — HOSPITAL ENCOUNTER (OUTPATIENT)
Dept: MRI IMAGING | Facility: HOSPITAL | Age: 63
End: 2023-02-17

## 2023-02-17 VITALS — WEIGHT: 199 LBS | HEIGHT: 63 IN | BODY MASS INDEX: 35.26 KG/M2

## 2023-02-17 DIAGNOSIS — H43.393 VITREOUS FLOATERS OF BOTH EYES: ICD-10-CM

## 2023-02-17 DIAGNOSIS — H53.8 BLURRED VISION, BILATERAL: ICD-10-CM

## 2023-02-17 DIAGNOSIS — D18.02 VENOUS ANGIOMA OF BRAIN (HCC): ICD-10-CM

## 2023-02-17 DIAGNOSIS — R05.3 CHRONIC COUGH: ICD-10-CM

## 2023-02-17 DIAGNOSIS — G89.29 CHRONIC INTRACTABLE HEADACHE, UNSPECIFIED HEADACHE TYPE: ICD-10-CM

## 2023-02-17 DIAGNOSIS — M85.80 OSTEOPENIA, UNSPECIFIED LOCATION: ICD-10-CM

## 2023-02-17 DIAGNOSIS — R51.9 CHRONIC INTRACTABLE HEADACHE, UNSPECIFIED HEADACHE TYPE: ICD-10-CM

## 2023-02-17 DIAGNOSIS — Z12.31 ENCOUNTER FOR SCREENING MAMMOGRAM FOR MALIGNANT NEOPLASM OF BREAST: ICD-10-CM

## 2023-02-17 DIAGNOSIS — Z78.0 POSTMENOPAUSE: ICD-10-CM

## 2023-02-17 RX ADMIN — GADOBUTROL 9 ML: 604.72 INJECTION INTRAVENOUS at 16:02

## 2023-02-27 ENCOUNTER — HOSPITAL ENCOUNTER (OUTPATIENT)
Dept: CT IMAGING | Facility: HOSPITAL | Age: 63
Discharge: HOME | End: 2023-02-27

## 2023-02-27 DIAGNOSIS — R93.89 OPACITY NOTED ON IMAGING STUDY: ICD-10-CM

## 2023-02-27 DIAGNOSIS — R05.3 CHRONIC COUGH: ICD-10-CM

## 2023-02-27 DIAGNOSIS — R93.89 CHEST X-RAY ABNORMALITY: ICD-10-CM

## 2023-03-06 ENCOUNTER — TELEPHONE (OUTPATIENT)
Dept: FAMILY MEDICINE CLINIC | Facility: CLINIC | Age: 63
End: 2023-03-06

## 2023-03-06 DIAGNOSIS — K22.89 ESOPHAGEAL THICKENING: ICD-10-CM

## 2023-03-06 DIAGNOSIS — R93.89 ABNORMAL CT OF THE CHEST: ICD-10-CM

## 2023-03-06 DIAGNOSIS — R91.8 PULMONARY NODULES: Primary | ICD-10-CM

## 2023-03-06 DIAGNOSIS — R05.3 CHRONIC COUGH: ICD-10-CM

## 2023-03-06 NOTE — TELEPHONE ENCOUNTER
Ileana vazquez from [address] [facility], [place], operated by [facility] radiology states there where significant findings on CT

## 2023-03-06 NOTE — TELEPHONE ENCOUNTER
Please notify patient I placed a referral for [facility]'s Pulmonology for evaluation of Pulmonary Nodules seen on CT Chest and her Chronic Cough  The pulmonary nodules look benign per Radiologist, however, I would like her to follow up with Pulmonology  In addition, I placed a referral to [facility] for evaluation of esophageal thickening seen on CT Chest which could also be causing her cough  Please fax referrals and schedule appointments  Referrals can be changed to whomever patient would like to see

## 2023-03-07 ENCOUNTER — CONSULT (OUTPATIENT)
Dept: GASTROENTEROLOGY | Facility: CLINIC | Age: 63
End: 2023-03-07

## 2023-03-07 VITALS
BODY MASS INDEX: 34.55 KG/M2 | HEIGHT: 63 IN | RESPIRATION RATE: 16 BRPM | WEIGHT: 195 LBS | DIASTOLIC BLOOD PRESSURE: 82 MMHG | TEMPERATURE: 98.8 F | OXYGEN SATURATION: 98 % | HEART RATE: 88 BPM | SYSTOLIC BLOOD PRESSURE: 151 MMHG

## 2023-03-07 DIAGNOSIS — R10.13 DYSPEPSIA: ICD-10-CM

## 2023-03-07 DIAGNOSIS — R68.81 EARLY SATIETY: ICD-10-CM

## 2023-03-07 DIAGNOSIS — R79.89 ELEVATED LFTS: ICD-10-CM

## 2023-03-07 DIAGNOSIS — R19.4 CHANGE IN BOWEL HABITS: ICD-10-CM

## 2023-03-07 DIAGNOSIS — R05.3 CHRONIC COUGH: ICD-10-CM

## 2023-03-07 DIAGNOSIS — R93.89 ABNORMAL CT OF THE CHEST: Primary | ICD-10-CM

## 2023-03-07 DIAGNOSIS — Z12.11 COLON CANCER SCREENING: ICD-10-CM

## 2023-03-07 DIAGNOSIS — K22.89 ESOPHAGEAL THICKENING: ICD-10-CM

## 2023-03-07 RX ORDER — IBUPROFEN 600 MG/1
TABLET ORAL
COMMUNITY
Start: 2023-01-10

## 2023-03-07 RX ORDER — FAMOTIDINE 20 MG/1
20 TABLET, FILM COATED ORAL 2 TIMES DAILY
Qty: 60 TABLET | Refills: 3 | Status: SHIPPED | OUTPATIENT
Start: 2023-03-07

## 2023-03-07 NOTE — PATIENT INSTRUCTIONS
I recommend checking an upper endoscopy for additional evaluation of the CT abnormalities  I want to ensure you have no significant thickening of your esophagus, Doss's esophagus, or while unlikely a precancerous or cancerous change  I also recommend a colonoscopy given no prior colon cancer screening on file  You can try pepcid twice daily (or you can take 2 pills prior to bed if your symptoms are primarily in the evening)    You can try something like benefiber to help regulate your bowel habits  Please have the repeat blood work completed for elevated liver tests  Gave labs, PRN  Visits but will call in about 3 month to see if she would like to do any of the procedures her and Chu talked about

## 2023-03-07 NOTE — PROGRESS NOTES
[facility] - Outpatient Consultation  Francisco Jacobo 65 y o  female MRN: [id number]  Encounter: [id number]    ASSESSMENT AND PLAN:      1  Abnormal CT of the chest  2  Esophageal thickening  3  Dyspepsia  4  Early satiety  5  Chronic cough    Patient referred for esophageal wall thickening as well as gastric distention noted on a CT chest   She does have intermittent chronic epigastric pain as well as more recent onset of heartburn/dyspepsia  We reviewed possible etiologies of esophageal wall thickening on an imaging study, which range from no pathology, to esophagitis, Doss's esophagus, malignancy, other etiology  Discussed recommendation for direct visualization for any intraluminal pathology with upper endoscopy  We reviewed pursuing upper endoscopy as to not delay diagnosis of some type of significant pathology  Patient wishes to consider procedure and will contact us with her final decision  Given her upper GI symptoms, we discussed trial of as needed H2B which can be increased to twice daily dosing if needed  Further escalation of therapy can be discussed if symptoms persist   Continue with diet and lifestyle modifications for GERD  Recommend small frequent meals low in saturated fats given early satiety  We also discussed that chronic cough etiology can sometimes be secondary to atypical GERD  It sounds as though her cough was likely multifactorial, possibly postinfectious from COVID, secondary to postnasal drip/upper airway source, possibly medication side effect with lisinopril  If bloating and early satiety persist an upper endoscopy is pursued and is normal, consider gastric emptying study as there may be a component of dysmotility     - Ambulatory Referral to Gastroenterology  - famotidine (PEPCID) 20 mg tablet; Take 1 tablet (20 mg total) by mouth 2 (two) times a day  Dispense: 60 tablet; Refill: 3    5  Colon cancer screening  6   Change in bowel habits    We discussed recommendations for index colonoscopy for colon cancer screening  She does have significant family history of IBD though no CRC  She does have sporadic bowel habits, that seem to be worsened with anxiety and certain oral intake, may be consistent with irritable bowel syndrome  She notes rare nocturnal awakening, though no overt bleeding  At this time, patient shares hesitancy when proceeding with colonoscopy given possible risks associated with procedure  She also wishes to think about this procedure prior to scheduling  She will contact clinic with her final decision  Discussed with patient that she can trial a dedicated fiber supplement to help regulate her bowel habits  7  Elevated LFTS    Incidentally noted elevated alkaline phosphatase on serologies from 01/2023  No prior comparisons available during chart review  She does have autoimmune disease  No known history of liver disease  Recommend fractionated alkaline phosphatase and checking GGT with next serologies  Additional work-up may be indicated if ALP elevation persist and is of liver origin  Will follow up with pt via phone to determine next steps    ______________________________________________________________________    HPI: Patient is a 65 y o  female who presents today for a consultation regarding chronic cough and abnormal chest CT  Past medical history significant for polymyalgia, psoriasis with arthropathy, HTN, allergic rhinitis, hypothyroidism, insulin resistance  Patient is new to clinic  She was referred for CT abnormalities noted on a chest CT scan completed for chronic cough  She shares that she has had a cough for the past couple of years ever since she had COVID  cough ranges from modestly productive in the morning to dry  She was on lisinopril, this was discontinued and her cough improved  She has been on several rounds of antibiotics and Tessalon Perles as well    She does endorse excess postnasal drip and allergies  She does have intermittent heartburn symptoms  This is somewhat more acute in nature for the past couple of months  She notes burning in her chest and up into her throat postprandially and in the evenings  She also endorses a chronic epigastric pain which has been intermittently occurring for several years  Will feel stabbing in the epigastric region at times  Endorses sensation of bloating and early satiety  Patient denies dysphagia or odynophagia  Weight has been stable over the past 6 months  She has been evaluated by cardiology in the past for some of this upper abdominal discomfort  She was told that she had some type of tachycardia, however, no concern for CAD  She notes her stools range from firm with constipation to urgent and loose  She has at least 1 bowel movement daily  She will have anywhere from 1-4 BMs daily  Notes that she developed fecal urgency and loose stool with anxiety  At times has cramping and pain in the lower abdomen prior to defecation, which abates after defecation  She does endorse rare nocturnal awakening to have a bowel movement  She denies BRBPR or melenic stool  Sister has Crohn's disease  No family history of colorectal cancer  NSAIDs: prn, rare use   Tobacco: none   Etoh: rare     No PPM/defibrillator, no anticoagulation, no supplemental O2, no CKD      01/2023: Hb 13 3, MCV 84, platelets 223, BUN 21, creatinine 0 70, , abdomen 3 6, T  bili 0 3, AST 11, ALT 24, A1c 6 5, TSH 3 25    02/2023: CT Chest: Anterior density noted on chest x-ray is related to prominent epicardial fat  Mild esophageal thickening should be correlated with any symptoms of chest pain or dysphagia  Small lung nodules up to 3 mm in size  Review of Systems   Constitutional: Negative for fever  Gastrointestinal: Positive for constipation, diarrhea and nausea  Negative for vomiting     Genitourinary: Negative for dysuria, frequency and hematuria  Musculoskeletal: Positive for arthralgias and myalgias  Neurological: Positive for headaches     Otherwise Per HPI     Historical Information   Past Medical History:   Diagnosis Date   • Allergic    • Asthma    • Bilateral ovarian cysts    • Disease of thyroid gland     HYPOTHYROID   • Eczema    • Familial cavernous cerebral angioma (HCC)    • Fibromyalgia    • Hypertension    • Insomnia    • MI (myocardial infarction) ([facility] [address] )    • Migraines    • Osteoarthritis    • Polyarthropathy    • Polymyalgia (HCC)    • Psoriasis    • TIA (transient ischemic attack)    • Vitamin deficiency      Past Surgical History:   Procedure Laterality Date   • DILATION AND CURETTAGE OF UTERUS      X 2   • ENDOMETRIAL ABLATION W/ NOVASURE Bilateral      Social History   Social History     Substance and Sexual Activity   Alcohol Use Yes    Comment: OCCASIOANLLY     Social History     Substance and Sexual Activity   Drug Use No     Social History     Tobacco Use   Smoking Status Former   • Types: Cigarettes   • Quit date: 1989   • Years since quittin 2   Smokeless Tobacco Never     Family History   Problem Relation Age of Onset   • Breast cancer Mother 37   • Hyperlipidemia Father    • Anxiety disorder Sister    • Rheum arthritis Sister    • Asthma Sister    • Depression Sister    • Crohn's disease Sister    • Lung cancer Sister    • Sjogren's syndrome Daughter    • Valvular heart disease Daughter    • Cervical cancer Daughter    • Arthritis Maternal Grandmother    • Heart disease Maternal Grandmother    • Stroke Maternal Grandmother    • No Known Problems Maternal Grandfather    • No Known Problems Paternal Grandmother    • Valvular heart disease Paternal Grandfather    • No Known Problems Maternal Aunt    • No Known Problems Maternal Aunt    • No Known Problems Maternal Aunt    • No Known Problems Paternal Aunt    • Breast cancer Paternal Aunt        Meds/Allergies       Current Outpatient Medications:   • albuterol (PROVENTIL HFA,VENTOLIN HFA) 90 mcg/act inhaler  •  Ascorbic Acid (VITAMIN C) 1000 MG tablet  •  Calcium Carbonate-Vit D-Min (Calcium-Vitamin D-Minerals) 600-400 MG-UNIT CHEW  •  cetirizine (ZyrTEC) 10 mg tablet  •  Cholecalciferol (D3-50) 1 25 MG (86315 UT) capsule  •  Cyanocobalamin (Vitamin B-12) 2500 MCG SUBL  •  Euthyrox 75 MCG tablet  •  fluticasone (FLONASE) 50 mcg/act nasal spray  •  ibuprofen (MOTRIN) 600 mg tablet  •  losartan-hydrochlorothiazide (HYZAAR) 100-12 5 MG per tablet  •  metoprolol succinate (TOPROL-XL) 50 mg 24 hr tablet  •  Otezla 30 MG TABS  •  zinc gluconate 50 mg tablet    Allergies   Allergen Reactions   • Other Cough     Objective     Blood pressure 151/82, pulse 88, temperature 98 8 °F (37 1 °C), temperature source Tympanic, resp  rate 16, height 5' 3" (1 6 m), weight 88 5 kg (195 lb), SpO2 98 %  Body mass index is 34 54 kg/m²  Physical Exam  Vitals and nursing note reviewed  HENT:      Head: Normocephalic and atraumatic  Eyes:      General: No scleral icterus  Conjunctiva/sclera: Conjunctivae normal    Cardiovascular:      Rate and Rhythm: Normal rate  Pulses: Normal pulses  Pulmonary:      Effort: Pulmonary effort is normal  No respiratory distress  Breath sounds: Normal breath sounds  Abdominal:      General: Abdomen is flat  Bowel sounds are normal  There is no distension  Palpations: Abdomen is soft  Tenderness: There is no abdominal tenderness  There is no guarding or rebound  Skin:     General: Skin is warm and dry  Coloration: Skin is not jaundiced  Neurological:      General: No focal deficit present  Mental Status: She is alert and oriented to person, place, and time  Psychiatric:         Mood and Affect: Mood normal          Behavior: Behavior normal         Lab Results:   No visits with results within 1 Day(s) from this visit     Latest known visit with results is:   Orders Only on 01/31/2023   Component Date Value • Hemoglobin A1C 01/31/2023 6   5      Radiology Results:   XR chest pa & lateral    Addendum Date: 2/21/2023 Addendum:   ADDENDUM: The chest radiograph from [facility] from 1/8/2021 is now available  The retrocardiac opacity has increased from 4 5 x 2 1 cm to 4 5 x 3 9 cm  This still may be benign pericardial fat but recommend confirmation with a chest CT with no contrast   I notified Lucila Briseno by Epic message on 2/21/2023 at 1:35 PM      Result Date: 2/21/2023  Narrative: CHEST INDICATION:   R05 3: Chronic cough  COMPARISON:  None EXAM PERFORMED/VIEWS:  XR CHEST PA & LATERAL FINDINGS: Cardiomediastinal silhouette appears unremarkable  No acute disease  5 cm retrocardiac opacity on the lateral projection  No pneumothorax or pleural effusion  Osseous structures appear within normal limits for patient age  Impression: No acute cardiopulmonary disease  5 cm retrocardiac opacity on the lateral projection  This may be a benign pericardial fat pad but the chest radiograph from [facility] from 1/8/2021 will be retrieved and an addendum will be dictated after comparison  Workstation performed: [id number]     CT chest wo contrast    Result Date: 3/6/2023  Narrative: CT CHEST WITHOUT IV CONTRAST INDICATION:   R93 89: Abnormal findings on diagnostic imaging of other specified body structures R05 3: Chronic cough  Retrocardiac density noted on chest x-ray COMPARISON:  Chest x-ray from 2/17/2023; 1/8/2021 TECHNIQUE: CT examination of the chest was performed without intravenous contrast  Axial, sagittal, and coronal 2D reformatted images were created from the source data and submitted for interpretation  Radiation dose length product (DLP) for this visit:  279 mGy-cm   This examination, like all CT scans performed in the [facility], was performed utilizing techniques to minimize radiation dose exposure, including the use of iterative reconstruction and automated exposure control  FINDINGS: LUNGS:  Left upper lobe 3 mm nodule, image 59, series 4  Right middle lobe 3 mm nodule, image 59  A few other small, less than 4 mm nodules are noted  Linear atelectasis versus scar is seen both in the lingula and left lower lobe as well as the right lower lobe  There is no tracheal or endobronchial lesion  PLEURA:  Unremarkable  HEART/GREAT VESSELS: Heart is unremarkable for patient's age  No thoracic aortic aneurysm  There is epicardial fat pad seen also involving the right and left cardiophrenic angles accounting for the chest x-ray abnormality MEDIASTINUM AND RAKESH:  Small mediastinal nodes are not pathologically enlarged  Slight thickening of the esophagus which is partially air-filled may be present  CHEST WALL AND LOWER NECK:  Unremarkable  VISUALIZED STRUCTURES IN THE UPPER ABDOMEN:  Moderate distention stomach with food debris is identified  OSSEOUS STRUCTURES:  No acute fracture or destructive osseous lesion  Impression: Anterior density noted on chest x-ray is related to prominent epicardial fat  Mild esophageal thickening should be correlated with any symptoms of chest pain or dysphagia  Further GI workup could be considered  Small lung nodules up to 3 mm in size  Based on current Fleischner Society 2017 Guidelines on incidental pulmonary nodule, no routine follow-up is needed if the patient is low risk  If the patient is high risk, optional follow-up chest CT at 12 months can be considered  Workstation performed: [id number]     MRA head wo contrast    Result Date: 2/21/2023  Narrative: MRA BRAIN WITHOUT CONTRAST INDICATION:  R51 9: Headache, unspecified G89 29: Other chronic pain D18 02: Hemangioma of intracranial structures H43 393: Other vitreous opacities, bilateral H53 8: Other visual disturbances COMPARISON:  None  TECHNIQUE:  Axial 3-D time-of-flight imaging with 3-D reconstructions performed without contrast    IV Contrast:  Not administered  FINDINGS: IMAGE QUALITY:  Diagnostic  ANATOMY INTERNAL CAROTID ARTERIES:  Normal flow related signal of the distal cervical, petrous and cavernous segments of the internal carotid arteries  Normal ICA terminus  ANTERIOR CIRCULATION:  Normal A1 segments  Normal anterior communicating artery  Normal flow-related signal of the anterior cerebral arteries  MIDDLE CEREBRAL ARTERY CIRCULATION:  In the superior division of the left middle cerebral artery there is a focal moderate stenosis, series 2 image 50, image 8  Right middle cerebral artery is normal  DISTAL VERTEBRAL ARTERIES:  Left vertebral artery is congenitally dominant  The posterior inferior cerebellar artery origins are normal  BASILAR ARTERY:  Normal  POSTERIOR CEREBRAL ARTERIES:  Fetal origin to the right posterior cerebral artery, developmental variant  Distally there is flow related enhancement bilaterally  Patent posterior communicating arteries  Impression: Moderate focal stenosis at the proximal aspect of the superior division of the right middle cerebral artery  No intracranial aneurysm  Workstation performed: [id number]     MRI brain w wo contrast    Result Date: 2/21/2023  Narrative: MRI BRAIN WITH AND WITHOUT CONTRAST INDICATION: R51 9: Headache, unspecified G89 29: Other chronic pain D18 02: Hemangioma of intracranial structures H43 393: Other vitreous opacities, bilateral H53 8: Other visual disturbances  COMPARISON:  None  TECHNIQUE: Multiplanar, multisequence imaging of the brain was performed before and after gadolinium administration  IV Contrast:  9 mL of Gadobutrol injection (SINGLE-DOSE)  IMAGE QUALITY:   Diagnostic  FINDINGS: BRAIN PARENCHYMA:  There is no discrete mass, mass effect or midline shift  There is no intracranial hemorrhage  Normal posterior fossa  Diffusion imaging is unremarkable      Small scattered hyperintensities on T2/FLAIR imaging are noted in the periventricular and subcortical white matter demonstrating an appearance that is statistically most likely to represent very mild microangiopathic change  Small linear enhancing structure with branching elements in the right parietal lobe, partially imaged on series 1000, image 33 having typical imaging features of a small developmental venous angioma, a variant in normal parenchymal venous drainage  Nonenhancing mass lesion  VENTRICLES:  Normal for the patient's age  SELLA AND PITUITARY GLAND:  Normal  ORBITS:  Normal  PARANASAL SINUSES:  Small mucous retention cyst  VASCULATURE:  Evaluation of the major intracranial vasculature demonstrates appropriate flow voids  CALVARIUM AND SKULL BASE:  Normal  EXTRACRANIAL SOFT TISSUES:  Normal      Impression: 1  No acute infarction, intracranial hemorrhage or mass  2   Trace, chronic microangiopathy  Workstation performed: [id number]     DXA bone density spine hip and pelvis    Result Date: 2/21/2023  Narrative: DXA SCAN CLINICAL HISTORY: 65 years postmenopausal female   OTHER RISK FACTORS:  None  PHARMACOLOGIC THERAPY FOR OSTEOPOROSIS:  None  TECHNIQUE: Bone densitometry was performed using a Hol[employer] Horizon A  bone densitometer  Regions of interest appear properly placed  COMPARISON: There are no prior DXA studies performed on this unit for comparison  RESULTS: LUMBAR SPINE Level: L1-L4 : BMD:  0 966  gm/cm2 T-score: -0 7 LEFT  TOTAL HIP: BMD:  0 837  gm/cm2 T-score:  -0 9 LEFT  FEMORAL NECK: BMD:  0 670  gm/cm2 T score: -1 6     Impression: 1  Low bone mass (osteopenia)  [Based on the left femoral neck] 2  The 10 year risk of hip fracture is 0 7% with the 10 year risk of major osteoporotic fracture being 8 0% as calculated by the [facility] fracture risk assessment tool (FRAX, which is based on data generated by the [facility] for Metabolic Bone Diseases)   3   The current NOF guidelines recommend treating patients with a T-score of -2 5 or less in the lumbar spine or hips, or in post-menopausal women and men over the age of 48 with low bone mass (osteopenia) and a FRAX 10 year risk score of >3% for hip fracture and/or >20% for major osteoporotic fracture  4   The NOF recommends follow-up DXA in 1-2 years after initiating therapy for osteoporosis and every 2 years thereafter  More frequent evaluation is appropriate for patients with conditions associated with rapid bone loss, such as glucocorticoid therapy  The interval between DXA screenings may be longer for individuals without major risk factors and initial T-score in the normal or upper low bone mass range  The FRAX algorithm has certain limitations: -FRAX has not been validated in patients currently or previously treated with pharmacotherapy for osteoporosis  In such patients, clinical judgment must be exercised in interpreting FRAX scores  -Prior hip, vertebral and humeral fragility fractures appear to confer greater risk of subsequent fracture than fractures at other sites (this is especially true for individuals with severe vertebral fractures), but quantification of this incremental risk is not possible with FRAX  -FRAX underestimates fracture risk in patients with history of multiple fragility fractures  -FRAX may underestimate fracture risk in patients with history of frequent falls  -It is not appropriate to use FRAX to monitor treatment response  WHO CLASSIFICATION: Normal (a T-score of -1 0 or higher) Low bone mineral density (a T-score of less than -1 0 but higher than -2 5) Osteoporosis (a T-score of -2 5 or less) Severe osteoporosis (a T-score of -2 5 or less with a fragility fracture) Workstation performed: [id number]     Mammo screening bilateral w 3d & cad    Result Date: 2/22/2023  Narrative: DIAGNOSIS: Encounter for screening mammogram for malignant neoplasm of breast TECHNIQUE: Digital screening mammography was performed  Computer Aided Detection (CAD) analyzed all applicable images   COMPARISONS: Prior breast imaging dated: 04/10/2019, 04/10/2019, 04/03/2019, 04/18/2017, 04/11/2016, 04/08/2015, and 04/07/2014 RELEVANT HISTORY: Family Breast Cancer History: History of breast cancer in Mother, Paternal Aunt  Family Medical History: Family medical history includes breast cancer in 2 relatives (mother, paternal aunt)  Personal History: Hormone history includes birth control  No known relevant surgical history  No known relevant medical history  The patient is scheduled in a reminder system for screening mammography  8-10% of cancers will be missed on mammography  Management of a palpable abnormality must be based on clinical grounds  Patients will be notified of their results via letter from our facility  Accredited by [employer] of Radiology and FDA  RISK ASSESSMENT: 5 Year Tyrer-Cuzick: 1 74 % 10 Year Tyrer-Cuzick: 3 5 % Lifetime Tyrer-Cuzick: 8 41 % TISSUE DENSITY: There are scattered areas of fibroglandular density  INDICATION: Queen Dennis is a 65 y o  female presenting for screening mammography  FINDINGS: There are no suspicious masses, grouped microcalcifications or areas of architectural distortion  The skin and nipple areolar complex are unremarkable  Impression: No mammographic evidence of malignancy  No significant change ASSESSMENT/BI-RADS CATEGORY: Left: 1 - Negative Right: 1 - Negative Overall: 1 - Negative RECOMMENDATION:      - Routine screening mammogram in 1 year for both breasts  Workstation ID: [id number]     Michelle Mckinney PA-C    **Please note:  Dictation voice to text software may have been used in the creation of this record  Occasional wrong word or “sound alike” substitutions may have occurred due to the inherent limitations of voice recognition software  Read the chart carefully and recognize, using context, where substitutions have occurred  **

## 2023-04-04 ENCOUNTER — TELEPHONE (OUTPATIENT)
Dept: GASTROENTEROLOGY | Facility: CLINIC | Age: 63
End: 2023-04-04

## 2023-04-04 NOTE — TELEPHONE ENCOUNTER
Omar and letter sent to pt to call office to [facility] follow up appt in June

## 2023-04-04 NOTE — LETTER
4/4/2023    Dear Kami Michelelor,    Review of our records shows you are due for the following:    Office Visit     Please call the following office to schedule your appointment:    [phone]    We look forward to hearing from you      Sincerely,    [facility]

## 2023-04-12 PROBLEM — R91.8 PULMONARY NODULES: Status: ACTIVE | Noted: 2023-04-12

## 2023-04-12 PROBLEM — R05.3 CHRONIC COUGH: Status: ACTIVE | Noted: 2023-04-12

## 2023-04-20 ENCOUNTER — HOSPITAL ENCOUNTER (OUTPATIENT)
Dept: PULMONOLOGY | Facility: HOSPITAL | Age: 63
Discharge: HOME | End: 2023-04-20
Attending: INTERNAL MEDICINE

## 2023-04-20 DIAGNOSIS — R05.3 CHRONIC COUGH: ICD-10-CM

## 2023-04-20 RX ORDER — ALBUTEROL SULFATE 2.5 MG/3ML
2.5 SOLUTION RESPIRATORY (INHALATION) ONCE AS NEEDED
Status: COMPLETED | OUTPATIENT
Start: 2023-04-20 | End: 2023-04-20

## 2023-04-20 RX ADMIN — ALBUTEROL SULFATE 2.5 MG: 2.5 SOLUTION RESPIRATORY (INHALATION) at 08:21

## 2023-05-23 ENCOUNTER — CONSULT (OUTPATIENT)
Dept: NEUROLOGY | Facility: CLINIC | Age: 63
End: 2023-05-23

## 2023-05-23 VITALS
HEART RATE: 75 BPM | SYSTOLIC BLOOD PRESSURE: 140 MMHG | TEMPERATURE: 98.2 F | BODY MASS INDEX: 34.63 KG/M2 | DIASTOLIC BLOOD PRESSURE: 70 MMHG | OXYGEN SATURATION: 99 % | WEIGHT: 195.5 LBS | RESPIRATION RATE: 14 BRPM

## 2023-05-23 DIAGNOSIS — R90.89 ABNORMAL MRA, BRAIN: ICD-10-CM

## 2023-05-23 DIAGNOSIS — H53.9 VISUAL DISTURBANCES: ICD-10-CM

## 2023-05-23 DIAGNOSIS — R51.9 CHRONIC INTRACTABLE HEADACHE, UNSPECIFIED HEADACHE TYPE: Primary | ICD-10-CM

## 2023-05-23 DIAGNOSIS — G89.29 CHRONIC INTRACTABLE HEADACHE, UNSPECIFIED HEADACHE TYPE: Primary | ICD-10-CM

## 2023-05-23 DIAGNOSIS — I66.01 STENOSIS OF RIGHT MIDDLE CEREBRAL ARTERY: ICD-10-CM

## 2023-05-23 NOTE — PROGRESS NOTES
Review of Systems   Constitutional: Negative  Negative for appetite change and fever  HENT: Negative  Negative for hearing loss, tinnitus, trouble swallowing and voice change  Eyes: Positive for photophobia and visual disturbance (Spots, floaters)  Negative for pain  Respiratory: Negative  Negative for shortness of breath  Cardiovascular: Negative  Negative for palpitations  Gastrointestinal: Negative  Negative for nausea and vomiting  Endocrine: Negative  Negative for cold intolerance  Genitourinary: Negative  Negative for dysuria, frequency and urgency  Musculoskeletal: Negative  Negative for gait problem, myalgias and neck pain  Skin: Negative  Negative for rash  Allergic/Immunologic: Negative  Neurological: Positive for dizziness, light-headedness and headaches (Years, daily, all around pressure pain)  Negative for tremors, seizures, syncope, facial asymmetry, speech difficulty, weakness and numbness  Hematological: Negative  Does not bruise/bleed easily  Psychiatric/Behavioral: Negative  Negative for confusion, hallucinations and sleep disturbance

## 2023-05-23 NOTE — PROGRESS NOTES
[facility] Consult  PATIENT:  Allen Schaefer  MRN:  [id number]  :  1960  DATE OF SERVICE:  2023  REFERRED BY: ROBBI Almonte  PMD: ROBBI Springer    Assessment/Plan:     Allen Schaefer is a very pleasant  65 y o  female with a past medical history that includes acquired hypothyroidism, allergic rhinitis, pulmonary nodules, history of TIA, history of migraines, history of myocardial infarction, hypertension psoriasis, polymyalgia, polyarthralgia, vitamin D deficiency referred here for evaluation of headache  Initial evaluation 2023     Moderate stenosis of the right MCA:    On the most recent MRA, patient was found to have some moderate stenosis/narrowing of the right middle cerebral artery  It did state in the patient's problem list that she did have a history of a TIA, when further evaluating the extent of these episodes it did not seem that this was likely related to a TIA  She had never presented to the hospital with any specific strokelike symptoms at any time that she could remember  Also, these events that were occurring had happened about 35 years ago in the past   Patient has no chronic strokes that were found on her most recent MRI of the brain     -At this time, given that this is asymptomatic stenosis I do believe that it would be warranted to have the patient try aspirin 81 mg once daily  Patient is going to require this medication over-the-counter, and take it every day to see if we can help widen up the MCA that is stenotic at this time and this will also help stabilize any atherosclerotic plaques as well  -As far as statin medication goes, would like for the patient to have a hemoglobin A1c and a lipid panel with her primary care provider  If her primary care provider believes that a statin medication is warranted at this time that is fine by me    If anything, we would just like for the patient's LDL to be below 100 mg/dL for the time being especially since there was no history of TIA or stroke that was able to be confirmed at this time   -Would also like for the patient to have a carotid ultrasound Doppler at this time  Does not appear that she has ever had a carotid ultrasound, and would like to rule out for any carotid artery stenosis at this time or anything that we should be concerned with especially given that there is stenosis in one of the major cerebral arteries of her brain  Chronic daily headache:    I had the pleasure of seeing Adolfo Hannah today in the office at [facility] [id number] [facility] in [place]  She is presenting today as a consult for her headaches and as well as the MRA head findings that indicated some moderate stenosis of the right middle cerebral artery  When evaluating patient, stated that she is getting headaches that are daily towards the back of the head  She thinks that a lot of these could be related to extended stress that she has  She states that they will usually start in the morning and then calm down later towards the afternoon  Sometimes they are so severe that she would have to take an ibuprofen for the headaches which do seem to help  These do seem to be related to a chronic tension type headache that rest of the back of the head, it is possible that these could be related to neck stiffness/tightness as well which could be triggering the headaches  Patient states that the pain is sort of like a pressure pain  No positional change headaches noted at this time  Patient did have recent imaging with an MRI brain with and without contrast, which did reveal a small venous angioma which we will keep an eye on but no other intracranial pathology     -At this time, patient should continue to pursue lifestyle modifications in terms for treating her headaches    Would encourage her to have good sleep hygiene, trying to put away electronics as close to bedtime as possible, staying away from caffeine in the late afternoon and towards the evening hours, and also trying to take some melatonin to see if that would help with her sleep   -Would like for the patient to continue to think about having a home sleep study if she is concern for sleep apnea, sometimes sleep apnea can cause these early morning headaches and chronic daily headaches as well   -At her next appointment with her ophthalmologist, would just like her to have a dilated eye exam to check for any increased pressure or any swelling at the optic disc  That way we can accurately evaluate and rule out IIH as a concern at this time  Although she does not have positional change headaches, she is having morning daily headaches   -We have opted against the route of preventative medication at this time, if the patient ever decides she would like to take something for anxiety that may help with sleep in the future, amitriptyline may be a good option for the patient  We can also look into any physical therapy for the neck or a muscle relaxant medication in the future as well   -She should continue to take ibuprofen as needed to abort these daily tension type headaches that she has currently  Patient Instructions:    Headache Calendar  Please maintain a headache calendar  Consider using phone applications such as Migraine Buddy or Migraine Diary    Headache/migraine treatment:     Rescue medications (for immediate treatment of a headache): It is ok to take ibuprofen, acetaminophen or naproxen (Advil, Tylenol,  Aleve, Excedrin) if they help your headaches you should limit these to No more than 3 times a week to avoid medication overuse/rebound headaches  Over the counter preventive supplements for headaches/migraines (if you try, try for 3 months straight)  (to take every day to help prevent headaches - not to take at the time of headache):   There are combo pills online of these - none of which regulated by FDA and double check dosing - take appropriate dose only once a day- prevent a migraine, migravent, mind ease, migrelief   [] Magnesium 400mg daily (If any diarrhea or upset stomach, decrease dose  as tolerated)  [] Riboflavin (Vitamin B2) 400mg daily (may make your urine bright/neon yellow)      Sleep and headache prevention:   [] Melatonin - you may take 1-3 mg nightly for sleep or headache prevention  You should take this 1 hour prior to bedtime consistently every night for it to work  It works by gradually helping to adjust your sleep time over days to weeks, rather than immediately making you feel sleepy  Lifestyle Recommendations:  [x] SLEEP - Maintain a regular sleep schedule: Adults need at least 7-8 hours of uninterrupted a night  Maintain good sleep hygiene:  Going to bed and waking up at consistent times, avoiding excessive daytime naps, avoiding caffeinated beverages in the evening, avoid excessive stimulation in the evening and generally using bed primarily for sleeping  One hour before bedtime would recommend turning lights down lower, decreasing your activity (may read quietly, listen to music at a low volume)  When you get into bed, should eliminate all technology (no texting, emailing, playing with your phone, iPad or tablet in bed)  [x] HYDRATION - Maintain good hydration  Drink  2L of fluid a day (4 typical small water bottles)  [x] DIET - Maintain good nutrition  In particular don't skip meals and try and eat healthy balanced meals regularly  [x] TRIGGERS - Look for other triggers and avoid them: Limit caffeine to 1-2 cups a day or less  Avoid dietary triggers that you have noticed bring on your headaches (this could include aged cheese, peanuts, MSG, aspartame and nitrates)  [x] EXERCISE - physical exercise as we all know is good for you in many ways, and not only is good for your heart, but also is beneficial for your mental health, cognitive health and  chronic pain/headaches   I would encourage at the least 5 days of physical exercise weekly for at least 30 minutes  Education and Follow-up  [x] Please call with any questions or concerns  Of course if any new concerning symptoms go to the emergency department  [x] Follow up in 6 months time with Ced Farmer      CC: We had the pleasure of evaluating Giselle PAUL Suki in neurological consultation today  Marti Hunt is a 43-year-old female who presents today for evaluation of headaches  History obtained from patient as well as available medical record review  History of Present Illness:   Current medical illnesses  or past medical history include  acquired hypothyroidism, allergic rhinitis, pulmonary nodules, history of TIA, history of migraines, history of myocardial infarction, hypertension psoriasis, polymyalgia, polyarthralgia, vitamin D deficiency     Interval History:    When asking about the patient's history of TIA, she had stated that she had episodes approximately 35 years ago  Patient stated that she would have these episodes that when she woke up in the middle the night she will have a racing heart rate, and then when she would try to stand up and walk around she would feel like she was leaning to one side when she tried to move  Patient stated that she never presented to the hospital at any strokelike symptoms  Also, denied any weakness, numbness, difficulty with speech, sudden vision loss, or sudden onset dizziness at this time  Patient had stated she was being worked up by cardiology around that time for these episodes of racing heart rate  There was no acute myocardial ischemia she stated, however they did find chronic evidence of myocardial ischemia that had already been there  Patient has not noted any specific strokelike symptoms since the time  Patient did have a recent MRI brain which did not show any evidence of chronic infarcts    Patient was found to have evidence of a small venous angioma, which was previously monitored by neurologist in the past she states  Most recent MRI brain still did not show the small venous angioma that she has  Migraines many years ago  Not having them anymore  Patient states that she is having early morning headaches almost every day, these are not related to migraine headache she states  Feels as though these headaches are more pressure-like sensation at the back of her head  Headaches started at what age? Always had early morning headaches  How often do the headaches occur? Almost every day  - as of 5/23/2023:   What time of the day do the headaches start? Usually when she wakes up in the morning  How long do the headaches last? Ibuprofen seems to ease up the headaches, usually well ease up by the afternoon  Are you ever headache free? Yes    Aura? No auras     Where is your headache located and pain quality? Has headache at the back of the head, more of pressure type pain  What is the intensity of pain? Worst 4/10, Average: 1/10  Associated symptoms:   [x] Stiff or sore neck         Things that make the headache worse? No movement or valsalva making them worse    No positional change headaches    Headache triggers:  stress    Have you seen someone else for headaches or pain? Yes, saw a doctor in [place] before  Have you had trigger point injection performed and how often? No  Have you had Botox injection performed and how often? No   Have you had epidural injections or transforaminal injections performed? No  Have you ever had any Brain imaging? yes MRI brain w and wo contrast recently  Last eye exam: two years ago, did have increased pressure at one point with opthmalogy     What medications do you take or have you taken for your headaches?    ABORTIVE:    OTC medications: Ibuprofen  Prescription: None    Past/ failed/contraindicated:  OTC medications: Tylenol   Prescription: None    PREVENTIVE:   None    Past/ failed/contraindicated:  None      LIFESTYLE  Sleep   - averages: sleeps about an hour or two at a time and then wakes up, never been evaluated for sleep apnea, sometimes snoring and no periods of apnea noted   Problems falling asleep?:   No  Problems staying asleep?:  Yes    Physical activity: Not very physically active at home, does go to the gym throughout the week     Water: does not drink much water, maybe one to two bottles  Caffeine: coffee in the morning    Mood: over thinking things, her job is very stressful and has some anxiety       The following portions of the patient's history were reviewed and updated as appropriate: allergies, current medications, past family history, past medical history, past social history, past surgical history and problem list     Pertinent family history:  Family history of headaches:  no known family members with significant headaches  Any family history of aneurysms - No    Pertinent social history:  Work:  for work   Lives with  and currently daughter and granddaughter     Illicit Drugs: denies  Alcohol/tobacco: Denies tobacco use, alcohol intake: social drinker    Past Medical History:     Past Medical History:   Diagnosis Date   • Allergic    • Asthma    • Bilateral ovarian cysts    • Disease of thyroid gland     HYPOTHYROID   • Eczema    • Familial cavernous cerebral angioma ([facility] )    • Fibromyalgia    • Hypertension    • Insomnia    • MI (myocardial infarction) ([facility] [place] [facility] )    • Migraines    • Osteoarthritis    • Polyarthropathy    • Polymyalgia ([facility] [place] [facility] )    • Psoriasis    • TIA (transient ischemic attack)    • Vitamin deficiency        Patient Active Problem List   Diagnosis   • Pain in both hands   • Polyarthralgia   • Polymyalgia ([facility] [place] [facility] )   • Allergic rhinitis due to allergen   • Insulin resistance   • Acquired hypothyroidism   • Essential hypertension   • Vitamin D deficiency   • Psoriasis with arthropathy ([facility] [place] [facility] )   • History of 2019 novel coronavirus disease (COVID-19)   • Pulmonary nodules   • Chronic cough       Medications: Current Outpatient Medications   Medication Sig Dispense Refill   • albuterol (PROVENTIL HFA,VENTOLIN HFA) 90 mcg/act inhaler Inhale 2 puffs every 6 (six) hours as needed for wheezing or shortness of breath 18 g 3   • Ascorbic Acid (VITAMIN C) 1000 MG tablet Take 1 tablet (1,000 mg total) by mouth daily 90 tablet 1   • cetirizine (ZyrTEC) 10 mg tablet Take 1 tablet (10 mg total) by mouth daily 30 tablet 5   • Cholecalciferol (Vitamin D3) 1 25 MG (20179 UT) CAPS TAKE 1 CAPSULE BY MOUTH ONCE EVERY MONTH(EVERY 28 DAYS) 3 capsule 1   • Cyanocobalamin (Vitamin B-12) 2500 MCG SUBL 1 tab po every 3rd day 90 tablet 1   • famotidine (PEPCID) 20 mg tablet Take 1 tablet (20 mg total) by mouth 2 (two) times a day 60 tablet 3   • fluticasone (Arnuity Ellipta) 100 MCG/ACT AEPB inhaler Inhale 1 puff daily Rinse mouth after use  30 blister 0   • fluticasone (FLONASE) 50 mcg/act nasal spray 1 spray each nostril 2 x daily x 1 month then prn congestion or allergies 16 g 5   • ibuprofen (MOTRIN) 600 mg tablet TAKE 1 TABLET BY MOUTH 4 TIMES DAILY AS NEEDED WITH FOOD OR MILK FOR PAIN     • levothyroxine 75 mcg tablet Take 1 tablet by mouth once daily 90 tablet 1   • losartan-hydrochlorothiazide (HYZAAR) 100-12 5 MG per tablet Take 1 tablet by mouth daily 90 tablet 2   • metoprolol succinate (TOPROL-XL) 50 mg 24 hr tablet Take 1 tablet (50 mg total) by mouth daily 90 tablet 2   • Otezla 30 MG TABS TAKE 1 TABLET BY MOUTH TWICE DAILY  TO START AFTER FINISHING STARTER PACK     • zinc gluconate 50 mg tablet Take 50 mg by mouth daily       No current facility-administered medications for this visit  Allergies:       Allergies   Allergen Reactions   • Other Cough   • Pollen Extract Cough and Sneezing       Family History:     Family History   Problem Relation Age of Onset   • Breast cancer Mother 37   • Hyperlipidemia Father    • Anxiety disorder Sister    • Rheum arthritis Sister    • Asthma Sister    • Depression Sister    • Crohn's disease Sister    • Lung cancer Sister    • Sjogren's syndrome Daughter    • Valvular heart disease Daughter    • Cervical cancer Daughter    • Arthritis Maternal Grandmother    • Heart disease Maternal Grandmother    • Stroke Maternal Grandmother    • No Known Problems Maternal Grandfather    • No Known Problems Paternal Grandmother    • Valvular heart disease Paternal Grandfather    • No Known Problems Maternal Aunt    • No Known Problems Maternal Aunt    • No Known Problems Maternal Aunt    • No Known Problems Paternal Aunt    • Breast cancer Paternal Aunt        Social History:       Social History     Socioeconomic History   • Marital status: /Civil Union     Spouse name: Not on file   • Number of children: Not on file   • Years of education: Not on file   • Highest education level: Not on file   Occupational History   • Not on file   Tobacco Use   • Smoking status: Former     Types: Cigarettes     Quit date: 1989     Years since quittin 4   • Smokeless tobacco: Never   Vaping Use   • Vaping Use: Never used   Substance and Sexual Activity   • Alcohol use: Yes     Comment: OCCASIOANLLY   • Drug use: No   • Sexual activity: Not on file   Other Topics Concern   • Not on file   Social History Narrative   • Not on file     Social Determinants of Health     Financial Resource Strain: Not on file   Food Insecurity: Not on file   Transportation Needs: Not on file   Physical Activity: Not on file   Stress: Not on file   Social Connections: Not on file   Intimate Partner Violence: Not on file   Housing Stability: Not on file         Objective:   Physical Exam:                                                                 Vitals:            Constitutional:    /70 (BP Location: Right arm, Patient Position: Sitting, Cuff Size: Standard)   Pulse 75   Temp 98 2 °F (36 8 °C) (Temporal)   Resp 14   Wt 88 7 kg (195 lb 8 oz)   SpO2 99%   BMI 34 63 kg/m²   BP Readings from Last 3 Encounters: 05/23/23 140/70   04/12/23 149/82   03/07/23 151/82     Pulse Readings from Last 3 Encounters:   05/23/23 75   04/12/23 76   03/07/23 88         Well developed, well nourished, well groomed  No dysmorphic features  Psychiatric:  Normal behavior and appropriate affect       Neurological Examination:     Mental status/cognitive function:   Orientated to time, place and person  Recent and remote memory intact  Attention span and concentration as well as fund of knowledge are appropriate for age  Normal language and spontaneous speech  Cranial Nerves:  II-visual fields full  III, IV, VI-Pupils were equal, round, and reactive to light and accomodation  Extraocular movements were full and conjugate without nystagmus  Conjugate gaze, normal smooth pursuits, normal saccades   V-facial sensation symmetric  VII-facial expression symmetric, intact forehead wrinkle, strong eye closure, symmetric smile    VIII-hearing grossly intact bilaterally   IX, X-palate elevation symmetric, no dysarthria  XI-shoulder shrug strength intact    XII-tongue protrusion midline  Motor Exam: symmetric bulk and tone throughout, no pronator drift  Power/strength 5/5 bilateral upper and lower extremities, no atrophy, fasciculations or abnormal movements noted  Sensory: grossly intact light touch in all extremities  Reflexes: brachioradialis 2+, biceps 2+, knee 1+, bilaterally  Coordination: Finger nose finger intact bilaterally, no apparent dysmetria, ataxia or tremor noted  Gait: steady casual and tandem gait  Pertinent Imaging:     MRI brain w and wo contrast, 02/17/2023:    IMPRESSION:        1  No acute infarction, intracranial hemorrhage or mass  2   Trace, chronic microangiopathy  MRA head wo contrast, 02/17/2023:    IMPRESSION:     Moderate focal stenosis at the proximal aspect of the superior division of the right middle cerebral artery  No intracranial aneurysm         Review of Systems:     Review of Systems   Constitutional: Negative  Negative for appetite change and fever  HENT: Negative  Negative for hearing loss, tinnitus, trouble swallowing and voice change  Eyes: Positive for photophobia and visual disturbance (Spots, floaters)  Negative for pain  Respiratory: Negative  Negative for shortness of breath  Cardiovascular: Negative  Negative for palpitations  Gastrointestinal: Negative  Negative for nausea and vomiting  Endocrine: Negative  Negative for cold intolerance  Genitourinary: Negative  Negative for dysuria, frequency and urgency  Musculoskeletal: Negative  Negative for gait problem, myalgias and neck pain  Skin: Negative  Negative for rash  Allergic/Immunologic: Negative  Neurological: Positive for dizziness, light-headedness and headaches (Years, daily, all around pressure pain)  Negative for tremors, seizures, syncope, facial asymmetry, speech difficulty, weakness and numbness  Hematological: Negative  Does not bruise/bleed easily  Psychiatric/Behavioral: Negative  Negative for confusion, hallucinations and sleep disturbance  I have spent 60 minutes with the patient today in which greater than 50% of this time was spent in counseling/coordination of care regarding Diagnostic results, Risks and benefits of tx options, Instructions for management, Patient and family education, Importance of tx compliance, Risk factor reductions, Impressions, Counseling / Coordination of care, Documenting in the medical record, Reviewing / ordering tests, medicine, procedures   and Obtaining or reviewing history    I also spent 20 minutes non face to face for this patient the same day       Activity Minutes   Precharting/reviewing 10   Patient care/counseling 60   Postcharting/care coordination 10       Author:  Vinita Hector PA-C 5/23/2023 8:03 AM

## 2023-05-23 NOTE — PATIENT INSTRUCTIONS
Moderate stenosis of the right MCA:    On the most recent MRA, patient was found to have some moderate stenosis/narrowing of the right middle cerebral artery  It did state in the patient's problem list that she did have a history of a TIA, when further evaluating the extent of these episodes it did not seem that this was likely related to a TIA  She had never presented to the hospital with any specific strokelike symptoms at any time that she could remember  Also, these events that were occurring had happened about 35 years ago in the past   Patient has no chronic strokes that were found on her most recent MRI of the brain     -At this time, given that this is asymptomatic stenosis I do believe that it would be warranted to have the patient try aspirin 81 mg once daily  Patient is going to require this medication over-the-counter, and take it every day to see if we can help widen up the MCA that is stenotic at this time and this will also help stabilize any atherosclerotic plaques as well  -As far as statin medication goes, would like for the patient to have a hemoglobin A1c and a lipid panel with her primary care provider  If her primary care provider believes that a statin medication is warranted at this time that is fine by me  If anything, we would just like for the patient's LDL to be below 100 mg/dL for the time being especially since there was no history of TIA or stroke that was able to be confirmed at this time   -Would also like for the patient to have a carotid ultrasound Doppler at this time  Does not appear that she has ever had a carotid ultrasound, and would like to rule out for any carotid artery stenosis at this time or anything that we should be concerned with especially given that there is stenosis in one of the major cerebral arteries of her brain  Chronic daily headache:    I had the pleasure of seeing Cheyenne Santoyo today in the office at [facility] [id number] [facility] in [place]  She is presenting today as a consult for her headaches and as well as the MRA head findings that indicated some moderate stenosis of the right middle cerebral artery  When evaluating patient, stated that she is getting headaches that are daily towards the back of the head  She thinks that a lot of these could be related to extended stress that she has  She states that they will usually start in the morning and then calm down later towards the afternoon  Sometimes they are so severe that she would have to take an ibuprofen for the headaches which do seem to help  These do seem to be related to a chronic tension type headache that rest of the back of the head, it is possible that these could be related to neck stiffness/tightness as well which could be triggering the headaches  Patient states that the pain is sort of like a pressure pain  No positional change headaches noted at this time  Patient did have recent imaging with an MRI brain with and without contrast, which did reveal a small venous angioma which we will keep an eye on but no other intracranial pathology     -At this time, patient should continue to pursue lifestyle modifications in terms for treating her headaches  Would encourage her to have good sleep hygiene, trying to put away electronics as close to bedtime as possible, staying away from caffeine in the late afternoon and towards the evening hours, and also trying to take some melatonin to see if that would help with her sleep   -Would like for the patient to continue to think about having a home sleep study if she is concern for sleep apnea, sometimes sleep apnea can cause these early morning headaches and chronic daily headaches as well   -At her next appointment with her ophthalmologist, would just like her to have a dilated eye exam to check for any increased pressure or any swelling at the optic disc  That way we can accurately evaluate and rule out IIH as a concern at this time  Although she does not have positional change headaches, she is having morning daily headaches   -We have opted against the route of preventative medication at this time, if the patient ever decides she would like to take something for anxiety that may help with sleep in the future, amitriptyline may be a good option for the patient  We can also look into any physical therapy for the neck or a muscle relaxant medication in the future as well   -She should continue to take ibuprofen as needed to abort these daily tension type headaches that she has currently  Patient Instructions:      Headache Calendar  Please maintain a headache calendar  Consider using phone applications such as Migraine Buddy or Migraine Diary    Headache/migraine treatment:     Rescue medications (for immediate treatment of a headache): It is ok to take ibuprofen, acetaminophen or naproxen (Advil, Tylenol,  Aleve, Excedrin) if they help your headaches you should limit these to No more than 3 times a week to avoid medication overuse/rebound headaches  Over the counter preventive supplements for headaches/migraines (if you try, try for 3 months straight)  (to take every day to help prevent headaches - not to take at the time of headache): There are combo pills online of these - none of which regulated by FDA and double check dosing - take appropriate dose only once a day- prevent a migraine, migravent, mind ease, migrelief   [] Magnesium 400mg daily (If any diarrhea or upset stomach, decrease dose  as tolerated)  [] Riboflavin (Vitamin B2) 400mg daily (may make your urine bright/neon yellow)      Sleep and headache prevention:   [] Melatonin - you may take 1-3 mg nightly for sleep or headache prevention  You should take this 1 hour prior to bedtime consistently every night for it to work  It works by gradually helping to adjust your sleep time over days to weeks, rather than immediately making you feel sleepy        Lifestyle Recommendations:  [x] SLEEP - Maintain a regular sleep schedule: Adults need at least 7-8 hours of uninterrupted a night  Maintain good sleep hygiene:  Going to bed and waking up at consistent times, avoiding excessive daytime naps, avoiding caffeinated beverages in the evening, avoid excessive stimulation in the evening and generally using bed primarily for sleeping  One hour before bedtime would recommend turning lights down lower, decreasing your activity (may read quietly, listen to music at a low volume)  When you get into bed, should eliminate all technology (no texting, emailing, playing with your phone, iPad or tablet in bed)  [x] HYDRATION - Maintain good hydration  Drink  2L of fluid a day (4 typical small water bottles)  [x] DIET - Maintain good nutrition  In particular don't skip meals and try and eat healthy balanced meals regularly  [x] TRIGGERS - Look for other triggers and avoid them: Limit caffeine to 1-2 cups a day or less  Avoid dietary triggers that you have noticed bring on your headaches (this could include aged cheese, peanuts, MSG, aspartame and nitrates)  [x] EXERCISE - physical exercise as we all know is good for you in many ways, and not only is good for your heart, but also is beneficial for your mental health, cognitive health and  chronic pain/headaches  I would encourage at the least 5 days of physical exercise weekly for at least 30 minutes  Education and Follow-up  [x] Please call with any questions or concerns  Of course if any new concerning symptoms go to the emergency department    [x] Follow up in 6 months time with Fermín Pan PA-C

## 2023-06-15 ENCOUNTER — HOSPITAL ENCOUNTER (OUTPATIENT)
Dept: NON INVASIVE DIAGNOSTICS | Facility: HOSPITAL | Age: 63
Discharge: HOME | End: 2023-06-15
Payer: COMMERCIAL

## 2023-06-15 DIAGNOSIS — I66.01 STENOSIS OF RIGHT MIDDLE CEREBRAL ARTERY: ICD-10-CM

## 2023-06-15 PROCEDURE — 93880 EXTRACRANIAL BILAT STUDY: CPT

## 2023-06-15 PROCEDURE — 93880 EXTRACRANIAL BILAT STUDY: CPT | Performed by: SURGERY

## 2023-06-21 ENCOUNTER — OFFICE VISIT (OUTPATIENT)
Dept: GASTROENTEROLOGY | Facility: CLINIC | Age: 63
End: 2023-06-21
Payer: COMMERCIAL

## 2023-06-21 VITALS
OXYGEN SATURATION: 98 % | HEIGHT: 63 IN | BODY MASS INDEX: 34.02 KG/M2 | DIASTOLIC BLOOD PRESSURE: 86 MMHG | HEART RATE: 73 BPM | TEMPERATURE: 98.4 F | WEIGHT: 192 LBS | SYSTOLIC BLOOD PRESSURE: 136 MMHG

## 2023-06-21 DIAGNOSIS — R93.89 ABNORMAL CT OF THE CHEST: Primary | ICD-10-CM

## 2023-06-21 DIAGNOSIS — Z12.11 COLON CANCER SCREENING: ICD-10-CM

## 2023-06-21 DIAGNOSIS — R10.13 DYSPEPSIA: ICD-10-CM

## 2023-06-21 DIAGNOSIS — R74.8 ELEVATED ALKALINE PHOSPHATASE LEVEL: ICD-10-CM

## 2023-06-21 DIAGNOSIS — K22.89 ESOPHAGEAL THICKENING: ICD-10-CM

## 2023-06-21 PROCEDURE — 99214 OFFICE O/P EST MOD 30 MIN: CPT | Performed by: PHYSICIAN ASSISTANT

## 2023-06-21 RX ORDER — FAMOTIDINE 40 MG/1
40 TABLET, FILM COATED ORAL
Qty: 30 TABLET | Refills: 5 | Status: SHIPPED | OUTPATIENT
Start: 2023-06-21

## 2023-06-21 NOTE — PATIENT INSTRUCTIONS
Please continue on nightly famotidine/pepcid 40mg  If you need something more potent because you are having breakthrough symptoms, I would next want to escalate therapy to something like Prilosec or Nexium  Continue with diet and lifestyle modifications for GERD  You can try something like a fiber supplement, something like Benefiber, Metamucil, Citrucel, this may help to add some bulk and for into your stool      As we did discuss in the past, lets keep a close eye on your symptoms as I would want you to have an upper endoscopy and colonoscopy as soon as you are able to ensure there is nothing serious going on in the GI tract

## 2023-06-21 NOTE — PROGRESS NOTES
[facility] - Outpatient Follow-up Note  Omer Ramires 65 y o  female MRN: [id number]  Encounter: [id number]    ASSESSMENT AND PLAN:      1  Abnormal CT of the chest  2  Esophageal thickening  3  Dyspepsia    Patient is here today to follow-up from 03/2023 when she was referred for esophageal wall thickening and gastric distention noted on a CT of the chest completed for chronic cough in 02/2023  She does have a history of heartburn symptoms which are currently adequately controlled on famotidine nightly  We once again reviewed the recommendation for direct endoscopic visualization to evaluate for mucosal abnormalities that were questioned on a chest CT  We reviewed differential diagnosis for these imaging findings which may include significant pathology  As patient has noted before, she wishes to defer any endoscopic evaluation at this time given financial constraints  We did review that delaying procedure may result in delayed diagnosis of significant pathology I have encouraged her to reach out to financial services and we will do the same to determine if there are any options so that she may have this procedure completed  - famotidine (PEPCID) 40 MG tablet; Take 1 tablet (40 mg total) by mouth daily at bedtime  Dispense: 30 tablet; Refill: 5    4  Colon cancer screening    As previously recommended, patient is due for colonoscopy for colon cancer screening  She has a family history of IBD though no family history of CRC  She politely declines screening colonoscopy at today's office visit and cites financial constraints and health insurance  She would like to reconsider in the future  I did discuss that should she develop any alarm features to the lower GI tract she is to contact us right away  Alternatively, she could discuss cologuard or alternative screening modality with her PCP  5  Elevated alkaline phosphatase level    Noted on previous blood work  We did repeat the alkaline phosphatase, fractionate this and check a GGT  Percentage from liver origin was normal as well as the GGT, indicating elevated alkaline phosphatase secondary to alternative origin such as bone  Would recommend following up with her PCP for additional evaluation of this serologic abnormality  We will follow up in approx 3-6 months to reassess pt's ability/willingness to proceed with endoscopic evaluation  ______________________________________________________________________    SUBJECTIVE: Patient is a 65 y o  female who presents today for follow-up regarding abnormal chest CT PMHx Sig for polymyalgia, psoriasis with arthropathy on Humira, HTN, allergic rhinitis, hypothyroidism, insulin resistance  Patient was previously evaluated in 03/2023  She underwent CT scan completed for chronic cough and this demonstrated mild esophageal thickening  She had noted intermittent heartburn symptoms, which has been progressing in frequency  She was also complaining of chronic epigastric pain  She was started on famotidine and recommended to have an upper endoscopy for direct visualization of the upper GI tract  She declined endoscopy at time of last office visit  She was also complaining of some alternating bowel habits  She  would develop some fecal urgency and loose stool with anxiety  It was recommended she have a colonoscopy as she was due for cancer screening purposes, though she declined  06/22/23:     Patient shares that she is feeling much better from a GI standpoint since last office visit  She has been taking famotidine 40 mg prior to bed  Since last office visit, she has only had 1 episode of heartburn/reflux since being on the medication, and this was after eating [employer]  She otherwise denies any heartburn, indigestion, nausea, vomiting, dysphagia, odynophagia, epigastric pain, early satiety  She denies any abnormal weight loss in the past 6 to 12 months      Patient notes her stools are essentially unchanged  She does eat clean, gross most of her veggies  Does have some loose stool and abdominal cramping related to stress  She also thinks that ever since starting on Otezla, her stools have been softer  Denies BRBPR or melena  Denies rectal pain or nocturnal BMs  Sister with Crohn's dz  No fam hx of CRC  NSAIDs: prn, rare use   Tobacco: none   Etoh: rare      No PPM/defibrillator, no anticoagulation, no supplemental O2, no CKD       01/2023: Hb 13 3, MCV 84, platelets 972, BUN 21, creatinine 0 70, , abdomen 3 6, T  bili 0 3, AST 11, ALT 24, A1c 6 5, TSH 3 25   02/2023: CT Chest: Anterior density noted on chest x-ray is related to prominent epicardial fat  Mild esophageal thickening should be correlated with any symptoms of chest pain or dysphagia    Small lung nodules up to 3 mm in size    04/2023: GGT 21, ALP isoenzymes: 143, ALP liver 84%, ALP bone 59%    Review of Systems   Otherwise Per HPI    Historical Information   Past Medical History:   Diagnosis Date   • Allergic    • Asthma    • Bilateral ovarian cysts    • Cluster headache Unknown   • Disease of thyroid gland     HYPOTHYROID   • Eczema    • Familial cavernous cerebral angioma (HCC)    • Fibromyalgia    • Fibromyalgia, primary Unknown   • Headache, tension-type Unknown   • Hypertension    • Insomnia    • MI (myocardial infarction) ([facility] [address] )    • Migraines    • Osteoarthritis    • Polyarthropathy    • Polymyalgia ([facility] [address] )    • Psoriasis    • TIA (transient ischemic attack)    • Vitamin deficiency      Past Surgical History:   Procedure Laterality Date   • DILATION AND CURETTAGE OF UTERUS      X 2   • ENDOMETRIAL ABLATION W/ NOVASURE Bilateral 2010     Social History   Social History     Substance and Sexual Activity   Alcohol Use Not Currently    Comment: OCCASIOANLLY     Social History     Substance and Sexual Activity   Drug Use No     Social History     Tobacco Use   Smoking Status Former   • Packs/day: 1 50 "  • Years: 15 00   • Total pack years: 22 50   • Types: Cigarettes   • Start date: 1978   • Quit date: 1991   • Years since quittin 4   Smokeless Tobacco Never     Family History   Problem Relation Age of Onset   • Breast cancer Mother 37   • Hyperlipidemia Father    • Anxiety disorder Sister    • Rheum arthritis Sister    • Asthma Sister    • Depression Sister    • Crohn's disease Sister    • Lung cancer Sister    • Sjogren's syndrome Daughter    • Valvular heart disease Daughter    • Cervical cancer Daughter    • Arthritis Maternal Grandmother    • Heart disease Maternal Grandmother    • Stroke Maternal Grandmother    • No Known Problems Maternal Grandfather    • No Known Problems Paternal Grandmother    • Valvular heart disease Paternal Grandfather    • Stroke Maternal Aunt    • No Known Problems Maternal Aunt    • No Known Problems Maternal Aunt    • No Known Problems Paternal Aunt    • Breast cancer Paternal Aunt      Meds/Allergies       Current Outpatient Medications:   •  albuterol (PROVENTIL HFA,VENTOLIN HFA) 90 mcg/act inhaler  •  Ascorbic Acid (VITAMIN C) 1000 MG tablet  •  aspirin (ECOTRIN LOW STRENGTH) 81 mg EC tablet  •  cetirizine (ZyrTEC) 10 mg tablet  •  Cholecalciferol (Vitamin D3) 1 25 MG (37362 UT) CAPS  •  Cyanocobalamin (Vitamin B-12) 2500 MCG SUBL  •  famotidine (PEPCID) 20 mg tablet  •  fluticasone (FLONASE) 50 mcg/act nasal spray  •  ibuprofen (MOTRIN) 600 mg tablet  •  levothyroxine 75 mcg tablet  •  losartan-hydrochlorothiazide (HYZAAR) 100-12 5 MG per tablet  •  metoprolol succinate (TOPROL-XL) 50 mg 24 hr tablet  •  Otezla 30 MG TABS  •  zinc gluconate 50 mg tablet  •  fluticasone (Arnuity Ellipta) 100 MCG/ACT AEPB inhaler    Allergies   Allergen Reactions   • Other Cough   • Pollen Extract Cough and Sneezing     Objective     Blood pressure 136/86, pulse 73, temperature 98 4 °F (36 9 °C), temperature source Tympanic, height 5' 3\" (1 6 m), weight 87 1 kg (192 lb), SpO2 98 %   " Body mass index is 34 01 kg/m²  Physical Exam  Vitals and nursing note reviewed  Constitutional:       Appearance: Normal appearance  HENT:      Head: Normocephalic and atraumatic  Eyes:      General: No scleral icterus  Conjunctiva/sclera: Conjunctivae normal    Cardiovascular:      Rate and Rhythm: Normal rate  Pulses: Normal pulses  Pulmonary:      Effort: Pulmonary effort is normal  No respiratory distress  Abdominal:      General: Bowel sounds are normal  There is no distension  Palpations: There is no mass  Tenderness: There is no abdominal tenderness  There is no rebound  Skin:     General: Skin is warm and dry  Coloration: Skin is not jaundiced  Neurological:      General: No focal deficit present  Mental Status: She is alert and oriented to person, place, and time  Psychiatric:         Mood and Affect: Mood normal          Behavior: Behavior normal        Lab Results:   No visits with results within 1 Day(s) from this visit  Latest known visit with results is:   Orders Only on 01/31/2023   Component Date Value   • Hemoglobin A1C 01/31/2023 6   5      Radiology Results:   VAS carotid complete study    Result Date: 6/15/2023  Narrative:  [facility] REPORT CLINICAL: Indications: Patient presents with stenosis of middle cerebral artery seen on other imaging  Patient is asymptomatic from a cerebral vascular standpoint  Operative History: Denies cardiovascular intervention Risk Factors The patient has history of HTN  Clinical Right Pressure:  152/ mm Hg, Left Pressure:  146/ mm Hg  FINDINGS:  Right        Impression  PSV  EDV (cm/s)  Direction of Flow  Ratio  Dist  ICA                 85          32                      1 39  Mid  ICA                  87          33                      1 42  Prox   ICA    1 - 49%      47          18                      0 76  Dist CCA                  52          17                            Mid CCA                   61 24                      0 83  Prox CCA                  74          19                            Ext Carotid               55          16                      0 90  Prox Vert                 55          14  Antegrade                 Subclavian                93           0                             Left         Impression  PSV  EDV (cm/s)  Direction of Flow  Ratio  Dist  ICA                 72          25                      1 01  Mid  ICA                  65          29                      0 92  Prox  ICA    1 - 49%      59          23                      0 83  Dist CCA                  53          20                            Mid CCA                   71          20                      0 65  Prox CCA                 109          24                            Ext Carotid               44           8                      0 63  Prox Vert                 39          16  Antegrade                 Subclavian                93           0                               CONCLUSION: Impression RIGHT: There is <50% stenosis noted in the internal carotid artery  Plaque is heterogenous and irregular  Vertebral artery flow is antegrade  There is no significant subclavian artery disease  LEFT: There is <50% stenosis noted in the internal carotid artery  Plaque is heterogenous and irregular  Vertebral artery flow is antegrade  There is no significant subclavian artery disease  SIGNATURE: Electronically Signed by: Glen Stanley MD, RPVI on 2023-06-15 10:53:49 PM    Shea Lozoya PA-C    **Please note:  Dictation voice to text software may have been used in the creation of this record  Occasional wrong word or “sound alike” substitutions may have occurred due to the inherent limitations of voice recognition software  Read the chart carefully and recognize, using context, where substitutions have occurred  **

## 2023-07-05 ENCOUNTER — OFFICE VISIT (OUTPATIENT)
Dept: PULMONOLOGY | Facility: CLINIC | Age: 63
End: 2023-07-05
Payer: COMMERCIAL

## 2023-07-05 VITALS
HEIGHT: 63 IN | TEMPERATURE: 98.8 F | OXYGEN SATURATION: 99 % | HEART RATE: 73 BPM | WEIGHT: 189 LBS | SYSTOLIC BLOOD PRESSURE: 149 MMHG | DIASTOLIC BLOOD PRESSURE: 89 MMHG | BODY MASS INDEX: 33.49 KG/M2

## 2023-07-05 DIAGNOSIS — R91.8 PULMONARY NODULES: ICD-10-CM

## 2023-07-05 DIAGNOSIS — R05.3 CHRONIC COUGH: Primary | ICD-10-CM

## 2023-07-05 PROCEDURE — 99214 OFFICE O/P EST MOD 30 MIN: CPT | Performed by: INTERNAL MEDICINE

## 2023-07-05 RX ORDER — CICLESONIDE 160 UG/1
1 AEROSOL, METERED RESPIRATORY (INHALATION) 2 TIMES DAILY
Qty: 6.1 G | Refills: 5 | Status: SHIPPED | OUTPATIENT
Start: 2023-07-05

## 2023-07-05 RX ORDER — MOMETASONE FUROATE 110 UG/1
2 INHALANT RESPIRATORY (INHALATION) EVERY EVENING
Qty: 1 EACH | Refills: 5 | Status: SHIPPED | OUTPATIENT
Start: 2023-07-05

## 2023-07-05 RX ORDER — BUDESONIDE 180 UG/1
2 AEROSOL, POWDER RESPIRATORY (INHALATION) 2 TIMES DAILY
Qty: 1 EACH | Refills: 5 | Status: SHIPPED | OUTPATIENT
Start: 2023-07-05

## 2023-07-05 RX ORDER — BECLOMETHASONE DIPROPIONATE HFA 80 UG/1
2 AEROSOL, METERED RESPIRATORY (INHALATION) 2 TIMES DAILY
Qty: 10.6 G | Refills: 5 | Status: SHIPPED | OUTPATIENT
Start: 2023-07-05

## 2023-07-05 RX ORDER — FLUTICASONE PROPIONATE 110 UG/1
2 AEROSOL, METERED RESPIRATORY (INHALATION) 2 TIMES DAILY
Qty: 12 G | Refills: 5 | Status: SHIPPED | OUTPATIENT
Start: 2023-07-05

## 2023-07-05 NOTE — PROGRESS NOTES
Pulmonary Follow Up Note   Gary Pina 65 y.o. female MRN: [id number]  7/5/2023    Assessment:    Pulmonary nodules  She would be due for follow-up this coming February, but I deferred decision making on this until her follow-up in January. This follow-up CT would be optional.    Chronic cough  My leading diagnosis for this remains cough variant asthma. Her insurance did not cover the Arnuity sent in at her last office visit. I sent in 5 alternatives to her pharmacy for price check as we are unable to price check through Epic. She will fill whichever one is cheapest.    Plan:    Diagnoses and all orders for this visit:    Chronic cough  -     budesonide (Pulmicort Flexhaler) 180 MCG/ACT inhaler; Inhale 2 puffs 2 (two) times a day Rinse mouth after use. -     fluticasone (Flovent HFA) 110 MCG/ACT inhaler; Inhale 2 puffs 2 (two) times a day Rinse mouth after use. -     mometasone (Asmanex, 30 Metered Doses,) 110 mcg/actuation AEPB inhaler; Inhale 2 puffs every evening Rinse mouth after use. -     beclomethasone (Qvar RediHaler) 80 MCG/ACT inhaler; Inhale 2 puffs 2 (two) times a day Rinse mouth after use. -     ciclesonide (Alvesco) 160 MCG/ACT inhaler; Inhale 1 puff 2 (two) times a day    Pulmonary nodules      Return in about 6 months (around 1/5/2024). History of Present Illness   HPI:  Gary Pina is a 65 y.o. female who presents for follow-up. She has not had any significant changes to her condition since her last appointment. She is taking the rescue inhaler only if she experiences significant chest tightness or has a coughing fit, which does not occur every day. This continues to be provoked by things like environmental humidity or strong smells like air fresheners or perfumes. She also continues to be short of breath with long distance walks on level ground, or with climbing a flight of stairs. No other new complaints.     Review of Systems   Respiratory: Positive for cough and shortness of breath.       Historical Information   Past Medical History:   Diagnosis Date   • Allergic    • Asthma    • Bilateral ovarian cysts    • Cluster headache Unknown   • Disease of thyroid gland     HYPOTHYROID   • Eczema    • Familial cavernous cerebral angioma (HCC)    • Fibromyalgia    • Fibromyalgia, primary Unknown   • Headache, tension-type Unknown   • Hypertension    • Insomnia    • MI (myocardial infarction) ([address])    • Migraines    • Osteoarthritis    • Polyarthropathy    • Polymyalgia (HCC)    • Psoriasis    • TIA (transient ischemic attack)    • Vitamin deficiency      Past Surgical History:   Procedure Laterality Date   • DILATION AND CURETTAGE OF UTERUS      X 2   • ENDOMETRIAL ABLATION W/ NOVASURE Bilateral 2010     Family History   Problem Relation Age of Onset   • Breast cancer Mother 37   • Hyperlipidemia Father    • Anxiety disorder Sister    • Rheum arthritis Sister    • Asthma Sister    • Depression Sister    • Crohn's disease Sister    • Lung cancer Sister    • Sjogren's syndrome Daughter    • Valvular heart disease Daughter    • Cervical cancer Daughter    • Arthritis Maternal Grandmother    • Heart disease Maternal Grandmother    • Stroke Maternal Grandmother    • No Known Problems Maternal Grandfather    • No Known Problems Paternal Grandmother    • Valvular heart disease Paternal Grandfather    • Stroke Maternal Aunt    • No Known Problems Maternal Aunt    • No Known Problems Maternal Aunt    • No Known Problems Paternal Aunt    • Breast cancer Paternal Aunt        Meds/Allergies     Current Outpatient Medications:   •  beclomethasone (Qvar RediHaler) 80 MCG/ACT inhaler, Inhale 2 puffs 2 (two) times a day Rinse mouth after use., Disp: 10.6 g, Rfl: 5  •  budesonide (Pulmicort Flexhaler) 180 MCG/ACT inhaler, Inhale 2 puffs 2 (two) times a day Rinse mouth after use., Disp: 1 each, Rfl: 5  •  ciclesonide (Alvesco) 160 MCG/ACT inhaler, Inhale 1 puff 2 (two) times a day, Disp: 6.1 g, Rfl: 5  • fluticasone (Flovent HFA) 110 MCG/ACT inhaler, Inhale 2 puffs 2 (two) times a day Rinse mouth after use., Disp: 12 g, Rfl: 5  •  mometasone (Asmanex, 30 Metered Doses,) 110 mcg/actuation AEPB inhaler, Inhale 2 puffs every evening Rinse mouth after use., Disp: 1 each, Rfl: 5  •  albuterol (PROVENTIL HFA,VENTOLIN HFA) 90 mcg/act inhaler, Inhale 2 puffs every 6 (six) hours as needed for wheezing or shortness of breath, Disp: 18 g, Rfl: 3  •  Ascorbic Acid (VITAMIN C) 1000 MG tablet, Take 1 tablet (1,000 mg total) by mouth daily, Disp: 90 tablet, Rfl: 1  •  aspirin (ECOTRIN LOW STRENGTH) 81 mg EC tablet, Take 81 mg by mouth daily, Disp: , Rfl:   •  cetirizine (ZyrTEC) 10 mg tablet, Take 1 tablet (10 mg total) by mouth daily, Disp: 30 tablet, Rfl: 5  •  Cholecalciferol (Vitamin D3) 1.25 MG (27880 UT) CAPS, TAKE 1 CAPSULE BY MOUTH ONCE EVERY MONTH(EVERY 28 DAYS), Disp: 3 capsule, Rfl: 1  •  Cyanocobalamin (Vitamin B-12) 2500 MCG SUBL, 1 tab po every 3rd day, Disp: 90 tablet, Rfl: 1  •  famotidine (PEPCID) 40 MG tablet, Take 1 tablet (40 mg total) by mouth daily at bedtime, Disp: 30 tablet, Rfl: 5  •  fluticasone (Arnuity Ellipta) 100 MCG/ACT AEPB inhaler, Inhale 1 puff daily Rinse mouth after use., Disp: 30 blister, Rfl: 0  •  fluticasone (FLONASE) 50 mcg/act nasal spray, 1 spray each nostril 2 x daily x 1 month then prn congestion or allergies, Disp: 16 g, Rfl: 5  •  ibuprofen (MOTRIN) 600 mg tablet, TAKE 1 TABLET BY MOUTH 4 TIMES DAILY AS NEEDED WITH FOOD OR MILK FOR PAIN, Disp: , Rfl:   •  levothyroxine 75 mcg tablet, Take 1 tablet by mouth once daily, Disp: 90 tablet, Rfl: 1  •  losartan-hydrochlorothiazide (HYZAAR) 100-12.5 MG per tablet, Take 1 tablet by mouth daily, Disp: 90 tablet, Rfl: 2  •  metoprolol succinate (TOPROL-XL) 50 mg 24 hr tablet, Take 1 tablet (50 mg total) by mouth daily, Disp: 90 tablet, Rfl: 2  •  Otezla 30 MG TABS, TAKE 1 TABLET BY MOUTH TWICE DAILY.  TO START AFTER FINISHING STARTER PACK, Disp: , Rfl:   •  zinc gluconate 50 mg tablet, Take 50 mg by mouth daily, Disp: , Rfl:   Allergies   Allergen Reactions   • Other Cough   • Pollen Extract Cough and Sneezing       Vitals: Blood pressure 149/89, pulse 73, temperature 98.8 °F (37.1 °C), height 5' 3" (1.6 m), weight 85.7 kg (189 lb), SpO2 99 %. Body mass index is 33.48 kg/m². Oxygen Therapy  SpO2: 99 %    Physical Exam  Physical Exam  Vitals reviewed. Constitutional:       General: She is not in acute distress. Appearance: Normal appearance. She is well-developed. She is not ill-appearing. HENT:      Head: Normocephalic and atraumatic. Eyes:      General: No scleral icterus. Conjunctiva/sclera: Conjunctivae normal.   Neck:      Vascular: No JVD. Cardiovascular:      Rate and Rhythm: Normal rate and regular rhythm. Heart sounds: Normal heart sounds. No murmur heard. No friction rub. No gallop. Pulmonary:      Effort: Pulmonary effort is normal. No respiratory distress. Breath sounds: Normal breath sounds. No wheezing or rales. Musculoskeletal:      Cervical back: Neck supple. Right lower leg: No edema. Left lower leg: No edema. Skin:     General: Skin is warm and dry. Findings: No rash. Neurological:      General: No focal deficit present. Mental Status: She is alert and oriented to person, place, and time. Mental status is at baseline. Psychiatric:         Mood and Affect: Mood normal.         Behavior: Behavior normal.     Labs: I have personally reviewed pertinent lab results. Imaging and other studies: I have personally reviewed relevant films in PACS. EKG, Pathology, and Other Studies: I have personally reviewed relevant reports in Epic. Rula Marie M.D.   [facility]

## 2023-07-05 NOTE — ASSESSMENT & PLAN NOTE
She would be due for follow-up this coming February, but I deferred decision making on this until her follow-up in January.   This follow-up CT would be optional.

## 2023-07-05 NOTE — ASSESSMENT & PLAN NOTE
My leading diagnosis for this remains cough variant asthma. Her insurance did not cover the Arnuity sent in at her last office visit. I sent in 5 alternatives to her pharmacy for price check as we are unable to price check through Epic.   She will fill whichever one is cheapest.

## 2023-08-09 DIAGNOSIS — J01.00 ACUTE NON-RECURRENT MAXILLARY SINUSITIS: ICD-10-CM

## 2023-08-09 DIAGNOSIS — J30.89 SEASONAL ALLERGIC RHINITIS DUE TO OTHER ALLERGIC TRIGGER: ICD-10-CM

## 2023-08-11 ENCOUNTER — RA CDI HCC (OUTPATIENT)
Dept: OTHER | Facility: HOSPITAL | Age: 63
End: 2023-08-11

## 2023-08-11 RX ORDER — CETIRIZINE HYDROCHLORIDE 10 MG/1
10 TABLET ORAL DAILY
Qty: 30 TABLET | Refills: 5 | Status: SHIPPED | OUTPATIENT
Start: 2023-08-11 | End: 2023-08-21 | Stop reason: SDUPTHER

## 2023-08-11 NOTE — PROGRESS NOTES
[address] [facility] coding opportunities       Chart reviewed, no opportunity found: CHART REVIEWED, NO OPPORTUNITY FOUND        Patients Insurance        Commercial Insurance: [employer]

## 2023-08-21 ENCOUNTER — OFFICE VISIT (OUTPATIENT)
Dept: FAMILY MEDICINE CLINIC | Facility: CLINIC | Age: 63
End: 2023-08-21
Payer: COMMERCIAL

## 2023-08-21 VITALS
WEIGHT: 196 LBS | HEART RATE: 77 BPM | SYSTOLIC BLOOD PRESSURE: 128 MMHG | TEMPERATURE: 99 F | RESPIRATION RATE: 18 BRPM | DIASTOLIC BLOOD PRESSURE: 74 MMHG | OXYGEN SATURATION: 99 % | BODY MASS INDEX: 34.72 KG/M2

## 2023-08-21 DIAGNOSIS — R91.8 PULMONARY NODULES: ICD-10-CM

## 2023-08-21 DIAGNOSIS — E53.8 VITAMIN B12 DEFICIENCY: ICD-10-CM

## 2023-08-21 DIAGNOSIS — M35.3 POLYMYALGIA (HCC): ICD-10-CM

## 2023-08-21 DIAGNOSIS — E03.9 ACQUIRED HYPOTHYROIDISM: ICD-10-CM

## 2023-08-21 DIAGNOSIS — Z13.1 SCREENING FOR DIABETES MELLITUS: ICD-10-CM

## 2023-08-21 DIAGNOSIS — M25.50 POLYARTHRALGIA: ICD-10-CM

## 2023-08-21 DIAGNOSIS — M85.80 OSTEOPENIA, UNSPECIFIED LOCATION: ICD-10-CM

## 2023-08-21 DIAGNOSIS — I10 ESSENTIAL HYPERTENSION: ICD-10-CM

## 2023-08-21 DIAGNOSIS — J30.89 SEASONAL ALLERGIC RHINITIS DUE TO OTHER ALLERGIC TRIGGER: ICD-10-CM

## 2023-08-21 DIAGNOSIS — Z12.4 SCREENING FOR CERVICAL CANCER: ICD-10-CM

## 2023-08-21 DIAGNOSIS — E55.9 VITAMIN D DEFICIENCY: ICD-10-CM

## 2023-08-21 DIAGNOSIS — Z13.0 SCREENING FOR DEFICIENCY ANEMIA: ICD-10-CM

## 2023-08-21 DIAGNOSIS — Z23 ENCOUNTER FOR IMMUNIZATION: ICD-10-CM

## 2023-08-21 DIAGNOSIS — Z00.00 ENCOUNTER FOR ANNUAL PHYSICAL EXAM: Primary | ICD-10-CM

## 2023-08-21 DIAGNOSIS — L40.50 PSORIASIS WITH ARTHROPATHY (HCC): ICD-10-CM

## 2023-08-21 DIAGNOSIS — J01.00 ACUTE NON-RECURRENT MAXILLARY SINUSITIS: ICD-10-CM

## 2023-08-21 DIAGNOSIS — Z13.220 SCREENING FOR HYPERLIPIDEMIA: ICD-10-CM

## 2023-08-21 PROCEDURE — 99396 PREV VISIT EST AGE 40-64: CPT | Performed by: NURSE PRACTITIONER

## 2023-08-21 RX ORDER — LEVOTHYROXINE SODIUM 0.07 MG/1
75 TABLET ORAL DAILY
Qty: 90 TABLET | Refills: 1 | Status: SHIPPED | OUTPATIENT
Start: 2023-08-21

## 2023-08-21 RX ORDER — ASPIRIN 81 MG
1 TABLET, DELAYED RELEASE (ENTERIC COATED) ORAL 2 TIMES DAILY
COMMUNITY

## 2023-08-21 RX ORDER — CETIRIZINE HYDROCHLORIDE 10 MG/1
10 TABLET ORAL DAILY
Qty: 90 TABLET | Refills: 1 | Status: SHIPPED | OUTPATIENT
Start: 2023-08-21

## 2023-08-21 RX ORDER — CHOLECALCIFEROL (VITAMIN D3) 1250 MCG
50000 CAPSULE ORAL
Qty: 3 CAPSULE | Refills: 1 | Status: SHIPPED | OUTPATIENT
Start: 2023-08-21

## 2023-08-21 RX ORDER — METOPROLOL SUCCINATE 50 MG/1
50 TABLET, EXTENDED RELEASE ORAL DAILY
Qty: 90 TABLET | Refills: 1 | Status: SHIPPED | OUTPATIENT
Start: 2023-08-21

## 2023-08-21 RX ORDER — LOSARTAN POTASSIUM AND HYDROCHLOROTHIAZIDE 12.5; 1 MG/1; MG/1
1 TABLET ORAL DAILY
Qty: 90 TABLET | Refills: 1 | Status: SHIPPED | OUTPATIENT
Start: 2023-08-21

## 2023-08-21 RX ORDER — FLUTICASONE PROPIONATE 50 MCG
SPRAY, SUSPENSION (ML) NASAL
Qty: 16 G | Refills: 5 | Status: SHIPPED | OUTPATIENT
Start: 2023-08-21

## 2023-08-21 NOTE — PATIENT INSTRUCTIONS
Wellness Visit for Adults   WHAT YOU NEED TO KNOW:   What is a wellness visit? A wellness visit is when you see your healthcare provider to get screened for health problems. Your healthcare provider will also give you advice on how to stay healthy. Write down your questions so you remember to ask them. Ask your healthcare provider how often you should have a wellness visit. What happens at a wellness visit? Your healthcare provider will ask about your health, and your family history of health problems. This includes high blood pressure, heart disease, and cancer. He or she will ask if you have symptoms that concern you, if you smoke, and about your mood. You may also be asked about your intake of medicines, supplements, food, and alcohol. Any of the following may be done: Your weight  will be checked. Your height may also be checked so your body mass index (BMI) can be calculated. Your BMI shows if you are at a healthy weight. Your blood pressure  and heart rate will be checked. Your temperature may also be checked. Blood and urine tests  may be done. Blood tests may be done to check your cholesterol levels. Abnormal cholesterol levels increase your risk for heart disease and stroke. You may also need a blood or urine test to check for diabetes if you are at increased risk. Urine tests may be done to look for signs of an infection or kidney disease. A physical exam  includes checking your heartbeat and lungs with a stethoscope. Your healthcare provider may also check your skin to look for sun damage. Screening tests  may be recommended. A screening test is done to check for diseases that may not cause symptoms. The screening tests you may need depend on your age, gender, family history, and lifestyle habits. For example, colorectal screening may be recommended if you are 48years old or older. What screening tests do I need if I am a woman? A Pap smear  is used to screen for cervical cancer.  Pap smears are usually done every 3 to 5 years depending on your age. You may need them more often if you have had abnormal Pap smear test results in the past. Ask your healthcare provider how often you should have a Pap smear. A mammogram  is an x-ray of your breasts to screen for breast cancer. Experts recommend mammograms every 2 years starting at age 48 years. You may need a mammogram at age 52 years or younger if you have an increased risk for breast cancer. Talk to your healthcare provider about when you should start having mammograms and how often you need them. What vaccines might I need? Get an influenza vaccine  every year. The influenza vaccine protects you from the flu. Several types of viruses cause the flu. The viruses change over time, so new vaccines are made each year. Get a tetanus-diphtheria (Td) booster vaccine  every 10 years. This vaccine protects you against tetanus and diphtheria. Tetanus is a severe infection that may cause painful muscle spasms and lockjaw. Diphtheria is a severe bacterial infection that causes a thick covering in the back of your mouth and throat. Get a human papillomavirus (HPV) vaccine  if you are female and aged 23 to 32 or male 23 to 24 and never received it. This vaccine protects you from HPV infection. HPV is the most common infection spread by sexual contact. HPV may also cause vaginal, penile, and anal cancers. Get a pneumococcal vaccine  if you are aged 72 years or older. The pneumococcal vaccine is an injection given to protect you from pneumococcal disease. Pneumococcal disease is an infection caused by pneumococcal bacteria. The infection may cause pneumonia, meningitis, or an ear infection. Get a shingles vaccine  if you are 60 or older, even if you have had shingles before. The shingles vaccine is an injection to protect you from the varicella-zoster virus. This is the same virus that causes chickenpox.  Shingles is a painful rash that develops in people who had chickenpox or have been exposed to the virus. How can I eat healthy? My Plate is a model for planning healthy meals. It shows the types and amounts of foods that should go on your plate. Fruits and vegetables make up about half of your plate, and grains and protein make up the other half. A serving of dairy is included on the side of your plate. The amount of calories and serving sizes you need depends on your age, gender, weight, and height. Examples of healthy foods are listed below:  Eat a variety of vegetables  such as dark green, red, and orange vegetables. You can also include canned vegetables low in sodium (salt) and frozen vegetables without added butter or sauces. Eat a variety of fresh fruits , canned fruit in 100% juice, frozen fruit, and dried fruit. Include whole grains. At least half of the grains you eat should be whole grains. Examples include whole-wheat bread, wheat pasta, brown rice, and whole-grain cereals such as oatmeal.    Eat a variety of protein foods such as seafood (fish and shellfish), lean meat, and poultry without skin (turkey and chicken). Examples of lean meats include pork leg, shoulder, or tenderloin, and beef round, sirloin, tenderloin, and extra lean ground beef. Other protein foods include eggs and egg substitutes, beans, peas, soy products, nuts, and seeds. Choose low-fat dairy products such as skim or 1% milk or low-fat yogurt, cheese, and cottage cheese. Limit unhealthy fats  such as butter, hard margarine, and shortening. How much exercise do I need? Exercise at least 30 minutes per day on most days of the week. Some examples of exercise include walking, biking, dancing, and swimming. You can also fit in more physical activity by taking the stairs instead of the elevator or parking farther away from stores. Include muscle strengthening activities 2 days each week. Regular exercise provides many health benefits.  It helps you manage your weight, and decreases your risk for type 2 diabetes, heart disease, stroke, and high blood pressure. Exercise can also help improve your mood. Ask your healthcare provider about the best exercise plan for you. What are some general health and safety guidelines I should follow? Do not smoke. Nicotine and other chemicals in cigarettes and cigars can cause lung damage. Ask your healthcare provider for information if you currently smoke and need help to quit. E-cigarettes or smokeless tobacco still contain nicotine. Talk to your healthcare provider before you use these products. Limit alcohol. A drink of alcohol is 12 ounces of beer, 5 ounces of wine, or 1½ ounces of liquor. Lose weight, if needed. Being overweight increases your risk of certain health conditions. These include heart disease, high blood pressure, type 2 diabetes, and certain types of cancer. Protect your skin. Do not sunbathe or use tanning beds. Use sunscreen with a SPF 15 or higher. Apply sunscreen at least 15 minutes before you go outside. Reapply sunscreen every 2 hours. Wear protective clothing, hats, and sunglasses when you are outside. Drive safely. Always wear your seatbelt. Make sure everyone in your car wears a seatbelt. A seatbelt can save your life if you are in an accident. Do not use your cell phone when you are driving. This could distract you and cause an accident. Pull over if you need to make a call or send a text message. Practice safe sex. Use latex condoms if are sexually active and have more than one partner. Your healthcare provider may recommend screening tests for sexually transmitted infections (STIs). Wear helmets, lifejackets, and protective gear. Always wear a helmet when you ride a bike or motorcycle, go skiing, or play sports that could cause a head injury. Wear protective equipment when you play sports. Wear a lifejacket when you are on a boat or doing water sports.     CARE AGREEMENT: You have the right to help plan your care. Learn about your health condition and how it may be treated. Discuss treatment options with your healthcare providers to decide what care you want to receive. You always have the right to refuse treatment. The above information is an  only. It is not intended as medical advice for individual conditions or treatments. Talk to your doctor, nurse or pharmacist before following any medical regimen to see if it is safe and effective for you. © Copyright [employer] 2022 Information is for End User's use only and may not be sold, redistributed or otherwise used for commercial purposes.

## 2023-08-21 NOTE — PROGRESS NOTES
ADULT ANNUAL PHYSICAL  [address]    NAME: Felisha Seaman  AGE: 59 y.o. SEX: female  : 1960     DATE: 2023     Assessment and Plan:     Problem List Items Addressed This Visit        Endocrine    Acquired hypothyroidism    Relevant Medications    levothyroxine 75 mcg tablet    metoprolol succinate (TOPROL-XL) 50 mg 24 hr tablet       Respiratory    Allergic rhinitis due to allergen    Relevant Medications    cetirizine (ZyrTEC) 10 mg tablet    fluticasone (FLONASE) 50 mcg/act nasal spray    Pulmonary nodules    Relevant Medications    cetirizine (ZyrTEC) 10 mg tablet    fluticasone (FLONASE) 50 mcg/act nasal spray       Cardiovascular and Mediastinum    Essential hypertension    Relevant Medications    losartan-hydrochlorothiazide (HYZAAR) 100-12.5 MG per tablet    metoprolol succinate (TOPROL-XL) 50 mg 24 hr tablet       Musculoskeletal and Integument    Polymyalgia (HCC)    Psoriasis with arthropathy (HCC)       Other    Polyarthralgia    Vitamin D deficiency    Relevant Medications    Cholecalciferol (Vitamin D3) 1.25 MG (17893 UT) CAPS   Other Visit Diagnoses     Encounter for annual physical exam    -  Primary    Vitamin B12 deficiency        Screening for cervical cancer        Relevant Orders    Liquid-based pap, screening ([facility])    Encounter for immunization        Osteopenia, unspecified location        Acute non-recurrent maxillary sinusitis        Relevant Medications    cetirizine (ZyrTEC) 10 mg tablet          Immunizations and preventive care screenings were discussed with patient today. Appropriate education was printed on patient's after visit summary. Counseling:  · Injury prevention: discussed safety/seat belts, safety helmets, smoke detectors, carbon dioxide detectors, and smoking near bedding or upholstery. Depression Screening and Follow-up Plan: Patient was screened for depression during today's encounter.  They screened negative with a PHQ-2 score of 0. Return in about 6 months (around 2/21/2024) for Recheck. Chief Complaint:     Chief Complaint   Patient presents with   • Follow-up      History of Present Illness:     Adult Annual Physical   Patient here for a comprehensive physical exam. The patient reports no problems. Reviewed results Dexa Scan and Mammogram. Currently being followed by [facility] who will be scheduling patient for EGD and Screening Colonoscopy. Continues to be followed by [facility]. [facility] for Hx Asthma and Pulmonary Nodules. Continues to be followed by [facility] for Hx Psoriatic Arthritis. Continues with chronic left knee pain which she is being followed by Dr. Jaz Ruvalcaba for. Continues to be followed by [facility] for Hx Moderate Stenosis of Right MCA which she was prescribed ASA 81mg daily. Diet and Physical Activity  · Diet/Nutrition: well balanced diet. · Exercise: no formal exercise. Depression Screening  PHQ-2/9 Depression Screening    Little interest or pleasure in doing things: 0 - not at all  Feeling down, depressed, or hopeless: 0 - not at all  PHQ-2 Score: 0  PHQ-2 Interpretation: Negative depression screen       General Health  · Sleep: sleeps well. · Hearing: normal - bilateral.  · Vision: goes for regular eye exams. · Dental: regular dental visits. /GYN Health  · Patient is: postmenopausal  · Last menstrual period: 2010 PONCHO  · Contraceptive method: PONCHO. Review of Systems:     Review of Systems   GENERAL:  Feels well, denies any significant changes in weight without trying. SKIN:  Denies rashes, lesions, opened areas, wounds, change in moles or any other skin changes. HEENT:  Denies any head injury or headaches. Negative blurred vision, floaters, spots before eyes, infections, or other vision problems. Negative significant changes in vision or hearing.   Negative tinnitus, vertigo, or infections. Negative hay fever, sinus trouble, nasal discharge, bloody noses, or problems with smell. Negative sore throat, bleeding gums, ulcers, or sores. NECK:  Denies lumps, goiter, pain, swollen glands, or lymphadenopathy. BREASTS:  Denies lumps, pain, nipple discharge, swelling, redness, or any other changes. RESPIRATORY:  Denies cough, wheezing, shortness of breath, dyspnea, or orthopnea. CARDIOVASCULAR:  Denies chest pain or palpitations. GASTROINTESTINAL:  Appetite good, denies nausea, vomiting, or indigestion. Bowel movements normal occurring about once daily or every other day. URINARY:  Denies frequency, urgency, incontinence, dysuria, hematuria, nocturia, or recent flank pain. GENITAL:  Denies vaginal discharge, pelvic infection, lesions, ulcers, or pain. Negative dyspareunia or abnormal vaginal bleeding. PERIPHERAL VASCULAR:  Denies varicosities, swelling, skin changes, or pain. MUSCULOSKELETAL:  Denies back, joint, or muscle pain. Negative problems with mobility or movement. PSYCHIATRIC:  Denies problems with depression, anxiety, anger, or other psychiatric symptoms. NEUROLOGIC:  Denies fainting, dizziness, memory problems, seizures, tingling, motor or sensory loss. HEMATOLOGIC:  Denies easy bruising, bleeding, or anemia. ENDOCRINE:  Denies thyroid problems, temperature intolerance, excessive sweating, or other endocrine symptoms.       Past Medical History:     Past Medical History:   Diagnosis Date   • Allergic    • Asthma    • Bilateral ovarian cysts    • Cluster headache Unknown   • Disease of thyroid gland     HYPOTHYROID   • Eczema    • Familial cavernous cerebral angioma (HCC)    • Fibromyalgia    • Fibromyalgia, primary Unknown   • Headache, tension-type Unknown   • Hypertension    • Insomnia    • MI (myocardial infarction) ([address])    • Migraines    • Osteoarthritis    • Polyarthropathy    • Polymyalgia (HCC)    • Psoriasis    • TIA (transient ischemic attack)    • Vitamin deficiency       Past Surgical History:     Past Surgical History:   Procedure Laterality Date   • DILATION AND CURETTAGE OF UTERUS      X 2   • ENDOMETRIAL ABLATION W/ NOVASURE Bilateral 2010      Social History:     Social History     Socioeconomic History   • Marital status: /Civil Union     Spouse name: None   • Number of children: None   • Years of education: None   • Highest education level: None   Occupational History   • None   Tobacco Use   • Smoking status: Former     Packs/day: 1.50     Years: 15.00     Total pack years: 22.50     Types: Cigarettes     Start date: 1978     Quit date: 1991     Years since quittin.6   • Smokeless tobacco: Never   Vaping Use   • Vaping Use: Never used   Substance and Sexual Activity   • Alcohol use: Not Currently     Comment: OCCASIOANLLY   • Drug use: No   • Sexual activity: Not Currently     Partners: Male     Birth control/protection: Other   Other Topics Concern   • None   Social History Narrative   • None     Social Determinants of Health     Financial Resource Strain: Not on file   Food Insecurity: Not on file   Transportation Needs: Not on file   Physical Activity: Not on file   Stress: Not on file   Social Connections: Not on file   Intimate Partner Violence: Not on file   Housing Stability: Not on file      Family History:     Family History   Problem Relation Age of Onset   • Breast cancer Mother 37   • Hyperlipidemia Father    • Anxiety disorder Sister    • Rheum arthritis Sister    • Asthma Sister    • Depression Sister    • Crohn's disease Sister    • Lung cancer Sister    • Sjogren's syndrome Daughter    • Valvular heart disease Daughter    • Cervical cancer Daughter    • Arthritis Maternal Grandmother    • Heart disease Maternal Grandmother    • Stroke Maternal Grandmother    • No Known Problems Maternal Grandfather    • No Known Problems Paternal Grandmother    • Valvular heart disease Paternal Grandfather    • Stroke Maternal Aunt • No Known Problems Maternal Aunt    • No Known Problems Maternal Aunt    • No Known Problems Paternal Aunt    • Breast cancer Paternal Aunt       Current Medications:     Current Outpatient Medications   Medication Sig Dispense Refill   • albuterol (PROVENTIL HFA,VENTOLIN HFA) 90 mcg/act inhaler Inhale 2 puffs every 6 (six) hours as needed for wheezing or shortness of breath 18 g 3   • Ascorbic Acid (VITAMIN C) 1000 MG tablet Take 1 tablet (1,000 mg total) by mouth daily 90 tablet 1   • aspirin (ECOTRIN LOW STRENGTH) 81 mg EC tablet Take 81 mg by mouth daily     • Calcium Carb-Cholecalciferol (Calcium 600 + D) 600-5 MG-MCG TABS Take 1 tablet by mouth 2 (two) times a day     • cetirizine (ZyrTEC) 10 mg tablet Take 1 tablet (10 mg total) by mouth daily 90 tablet 1   • Cholecalciferol (Vitamin D3) 1.25 MG (10619 UT) CAPS Take 1 capsule (50,000 Units total) by mouth every 28 days 3 capsule 1   • Cyanocobalamin (Vitamin B-12) 2500 MCG SUBL 1 tab po every 3rd day 90 tablet 1   • famotidine (PEPCID) 40 MG tablet Take 1 tablet (40 mg total) by mouth daily at bedtime 30 tablet 5   • fluticasone (FLONASE) 50 mcg/act nasal spray 1 spray each nostril 2 x daily x 1 month then prn congestion or allergies 16 g 5   • ibuprofen (MOTRIN) 600 mg tablet TAKE 1 TABLET BY MOUTH 4 TIMES DAILY AS NEEDED WITH FOOD OR MILK FOR PAIN     • levothyroxine 75 mcg tablet Take 1 tablet (75 mcg total) by mouth daily 90 tablet 1   • losartan-hydrochlorothiazide (HYZAAR) 100-12.5 MG per tablet Take 1 tablet by mouth daily 90 tablet 1   • metoprolol succinate (TOPROL-XL) 50 mg 24 hr tablet Take 1 tablet (50 mg total) by mouth daily 90 tablet 1   • Otezla 30 MG TABS TAKE 1 TABLET BY MOUTH TWICE DAILY. TO START AFTER FINISHING STARTER PACK     • zinc gluconate 50 mg tablet Take 50 mg by mouth daily     • fluticasone (Arnuity Ellipta) 100 MCG/ACT AEPB inhaler Inhale 1 puff daily Rinse mouth after use.  30 blister 0     No current facility-administered medications for this visit. Allergies: Allergies   Allergen Reactions   • Other Cough   • Pollen Extract Cough and Sneezing      Physical Exam:     /74   Pulse 77   Temp 99 °F (37.2 °C) (Temporal)   Resp 18   Wt 88.9 kg (196 lb)   SpO2 99%   BMI 34.72 kg/m²     Physical Exam  Vitals and nursing note reviewed. GENERAL:  Appears well nourished, well groomed, in no acute distress. SKIN:  Palms warm, dry, color good. Nails without clubbing or cyanosis. No lesions, ulcerations, or wounds. HEAD:  Hair is average texture. Scalp without lesions, normocephalic, and atraumatic. EYES: PERRLA, EOMs intact. EARS:  B/L ear canals clear. B/L TMs clear with + light reflex. NOSE: Mucosa pink, moist, septum midline. Negative sinus tenderness. B/L turbinates pink, moist, non-edematous without exudate. MOUTH:  Oral mucosa pink. Pharynx pink, moist, without swelling, redness, or exudate. Dentition ok. Tongue midline. NECK:  Supple, trachea midline, Negative thyromegaly, lymphadenopathy, or swollen glands. LYMPH NODES:  Negative enlargement of neck, axillary, epitrochlear, or inguinal nodes. THORAX/LUNGS  Thorax symmetric with good excursion. Lungs resonant. Breath sounds vesicular with no added sounds. Diaphragm descends within normal limits. CARDIOVASCULAR:  Carotid upstrokes brisk and without bruits. Apical impulse discrete and tapping, barely palpable in the 5th ICS/MCL. Normal S1 and Normal S2, Negative S3 or S4. Negative murmurs, thrills, lifts, or heaves. ABDOMEN:  Protuberant, bowel sounds normal active x 4 quadrants. Negative tenderness. Negative masses. Negative hepatomegaly. Negative splenomegaly. Negative costovertebral tenderness. EXTREMITIES:  Warm, calves supple, non-tender, negative for edema. Negative stasis pigmentation or ulcers. +2 pulses throughout. MUSCULOSKELETAL:  Negative joint deformities.     Good range of motion in hands, wrists, elbows, shoulders, spine, hips, knees, and ankles. NEUROLOGICAL:  Mental status:  Awake, alert, and oriented to person, place, time, and event. Normal thought processes.         [facility], [address]

## 2023-08-25 LAB — HBA1C MFR BLD HPLC: 6 %

## 2023-11-15 ENCOUNTER — TELEPHONE (OUTPATIENT)
Dept: PULMONOLOGY | Facility: CLINIC | Age: 63
End: 2023-11-15

## 2023-11-17 ENCOUNTER — TELEPHONE (OUTPATIENT)
Dept: NEUROLOGY | Facility: CLINIC | Age: 63
End: 2023-11-17

## 2023-11-17 NOTE — TELEPHONE ENCOUNTER
Called and spoke with patient to reschedule OVL with [facility], first available follow up spot on 1/30/23 at 9:15am in [facility].  Patient accepted and I have added to the waitlist.

## 2024-01-12 ENCOUNTER — TELEPHONE (OUTPATIENT)
Dept: NEUROLOGY | Facility: CLINIC | Age: 64
End: 2024-01-12

## 2024-01-22 ENCOUNTER — TELEPHONE (OUTPATIENT)
Dept: NEUROLOGY | Facility: CLINIC | Age: 64
End: 2024-01-22

## 2024-01-22 NOTE — TELEPHONE ENCOUNTER
Spoke to patient, I confirmed their upcoming appointment. Patient was informed of date/time and location of visit.

## 2024-01-30 ENCOUNTER — OFFICE VISIT (OUTPATIENT)
Dept: NEUROLOGY | Facility: CLINIC | Age: 64
End: 2024-01-30
Payer: COMMERCIAL

## 2024-01-30 VITALS
TEMPERATURE: 97.2 F | WEIGHT: 200 LBS | OXYGEN SATURATION: 99 % | BODY MASS INDEX: 35.43 KG/M2 | DIASTOLIC BLOOD PRESSURE: 70 MMHG | SYSTOLIC BLOOD PRESSURE: 124 MMHG | HEART RATE: 65 BPM

## 2024-01-30 DIAGNOSIS — I66.01 STENOSIS OF RIGHT MIDDLE CEREBRAL ARTERY: ICD-10-CM

## 2024-01-30 DIAGNOSIS — H53.9 VISUAL DISTURBANCES: ICD-10-CM

## 2024-01-30 DIAGNOSIS — R51.9 CHRONIC INTRACTABLE HEADACHE, UNSPECIFIED HEADACHE TYPE: Primary | ICD-10-CM

## 2024-01-30 DIAGNOSIS — I10 ESSENTIAL HYPERTENSION: ICD-10-CM

## 2024-01-30 DIAGNOSIS — E78.5 HYPERLIPIDEMIA: ICD-10-CM

## 2024-01-30 DIAGNOSIS — G89.29 CHRONIC INTRACTABLE HEADACHE, UNSPECIFIED HEADACHE TYPE: Primary | ICD-10-CM

## 2024-01-30 PROCEDURE — 3074F SYST BP LT 130 MM HG: CPT

## 2024-01-30 PROCEDURE — 3078F DIAST BP <80 MM HG: CPT

## 2024-01-30 PROCEDURE — 99214 OFFICE O/P EST MOD 30 MIN: CPT

## 2024-01-30 RX ORDER — ATORVASTATIN CALCIUM 20 MG/1
20 TABLET, FILM COATED ORAL DAILY
Qty: 30 TABLET | Refills: 3 | Status: SHIPPED | OUTPATIENT
Start: 2024-01-30

## 2024-01-30 NOTE — PATIENT INSTRUCTIONS
History of right middle cerebral artery stenosis:    -Would recommend that the patient repeat a lipid panel in 2 to 3 months after starting her atorvastatin 20 mg once daily for secondary stroke prevention, and also to help reduce her LDL cholesterol.  -MRA without contrast ordered at this time to reevaluate the patient's right middle cerebral artery stenosis and make sure that there is no worsening and that everything continues to remain stable.    - For ongoing stroke prevention continue: Aspirin 81 mg once daily, starting Lipitor 20 mg daily  - Discussed the importance of antiplatelet management with the patient to prevent future strokes.   - Recommend to check blood pressure occasionally away from the doctor's office to make sure that those numbers are typically less than 130/80.  If they are frequently higher than that, we recommend checking a little more often and to follow up with primary care team   - Will defer to primary care team for monitoring of cholesterol panel and blood sugar numbers with target LDL cholesterol of less than 70 and hemoglobin A1c less than 7%  - Recommend following a low salt, mediterranean diet   - Recommend routine physical exercise as tolerated     We will plan for her to return to the office in 1 year time to see on of the APPs or Dr. Elliott but would be happy to see her sooner if the need should arise.  If she has any symptoms concerning for TIA or stroke including sudden painless loss of vision or double vision, difficulty speaking or swallowing, vertigo/room spinning that does not quickly resolve, or weakness/numbness/loss of coordination affecting 1 side of the face or body she should proceed by ambulance to the nearest emergency room immediately.       Chronic daily headaches:    Patient Instructions:    Headache Calendar  Please maintain a headache calendar  Consider using phone applications such as Migraine Krishan or Migraine Diary    Headache/migraine treatment:      Rescue medications (for immediate treatment of a headache):   It is ok to take ibuprofen, acetaminophen or naproxen (Advil, Tylenol,  Aleve, Excedrin) if they help your headaches you should limit these to No more than 3 times a week to avoid medication overuse/rebound headaches.     Lifestyle Recommendations:  [x] SLEEP - Maintain a regular sleep schedule: Adults need at least 7-8 hours of uninterrupted a night. Maintain good sleep hygiene:  Going to bed and waking up at consistent times, avoiding excessive daytime naps, avoiding caffeinated beverages in the evening, avoid excessive stimulation in the evening and generally using bed primarily for sleeping.  One hour before bedtime would recommend turning lights down lower, decreasing your activity (may read quietly, listen to music at a low volume). When you get into bed, should eliminate all technology (no texting, emailing, playing with your phone, iPad or tablet in bed).  [x] HYDRATION - Maintain good hydration.  Drink  2L of fluid a day (4 typical small water bottles)  [x] DIET - Maintain good nutrition. In particular don't skip meals and try and eat healthy balanced meals regularly.  [x] TRIGGERS - Look for other triggers and avoid them: Limit caffeine to 1-2 cups a day or less. Avoid dietary triggers that you have noticed bring on your headaches (this could include aged cheese, peanuts, MSG, aspartame and nitrates).  [x] EXERCISE - physical exercise as we all know is good for you in many ways, and not only is good for your heart, but also is beneficial for your mental health, cognitive health and  chronic pain/headaches. I would encourage at the least 5 days of physical exercise weekly for at least 30 minutes.     Education and Follow-up  [x] Please call with any questions or concerns. Of course if any new concerning symptoms go to the emergency department.  [x] Follow up in 1 year with Vignesh BHATIA

## 2024-01-30 NOTE — PROGRESS NOTES
Patient ID: Giselle Cohn is a 63 y.o. female who presents to the [facility].    Assessment/Plan:    History of right middle cerebral artery stenosis:    I had the pleasure of seeing Giselle today in the office at [facility].  She is presenting today for an office visit follow-up in regard to her right middle cerebral artery stenosis and chronic daily headaches.  At this point in time, in terms of the headaches she notes that they have been much more manageable.  She states that for the most part she is not really having daily headaches but she was also not able to remember exactly how often is occurring.  She does take ibuprofen as needed for the headaches, she notes that the majority of the pain is occurring at the base of the head/neck area.  Did talk with the patient about potentially doing physical therapy for the head and neck area, she did not want to proceed with this at this time.    In regard for stroke prevention, the patient continues to take aspirin 81 mg once daily and we were going to be starting Lipitor 20 mg daily with her at this time.  Her most recent lipid panel had showed an LDL of 129 mg/dL.  Therefore, felt as though it was appropriate with the patient's stroke risk factors that we start on a statin medication at this time.  Even though the patient did not have a potential stroke and had maybe a distant past of TIA, I do believe it is appropriate for her to start medication.  We will repeat a lipid panel in about 2 to 3 months of starting the atorvastatin to see how her cholesterol is doing.  I did also order an MRA head without contrast at this time to continue to reevaluate the patient's right middle cerebral artery stenosis.  I do believe it is important that the patient has this at least once yearly.  And we will plan on an annual follow-up every year to make sure that the patient continues to have this imaging performed to look for any  potential worsening stenosis of the middle cerebral artery.      -Would recommend that the patient repeat a lipid panel in 2 to 3 months after starting her atorvastatin 20 mg once daily for secondary stroke prevention, and also to help reduce her LDL cholesterol.  -MRA head without contrast ordered at this time to reevaluate the patient's right middle cerebral artery stenosis and make sure that there is no worsening and that everything continues to remain stable.    - For ongoing stroke prevention continue: Aspirin 81 mg once daily, starting Lipitor 20 mg daily  - Discussed the importance of antiplatelet management with the patient to prevent future strokes.   - Recommend to check blood pressure occasionally away from the doctor's office to make sure that those numbers are typically less than 130/80.  If they are frequently higher than that, we recommend checking a little more often and to follow up with primary care team   - Will defer to primary care team for monitoring of cholesterol panel and blood sugar numbers with target LDL cholesterol of less than 70 and hemoglobin A1c less than 7%  - Recommend following a low salt, mediterranean diet   - Recommend routine physical exercise as tolerated     We will plan for her to return to the office in 1 year time to see on of the APPs or Dr. Elliott but would be happy to see her sooner if the need should arise.  If she has any symptoms concerning for TIA or stroke including sudden painless loss of vision or double vision, difficulty speaking or swallowing, vertigo/room spinning that does not quickly resolve, or weakness/numbness/loss of coordination affecting 1 side of the face or body she should proceed by ambulance to the nearest emergency room immediately.       Chronic daily headaches:    Patient Instructions:    Headache Calendar  Please maintain a headache calendar  Consider using phone applications such as Migraine Krishan or Migraine Diary    Headache/migraine  treatment:     Rescue medications (for immediate treatment of a headache):   It is ok to take ibuprofen, acetaminophen or naproxen (Advil, Tylenol,  Aleve, Excedrin) if they help your headaches you should limit these to No more than 3 times a week to avoid medication overuse/rebound headaches.     Lifestyle Recommendations:  [x] SLEEP - Maintain a regular sleep schedule: Adults need at least 7-8 hours of uninterrupted a night. Maintain good sleep hygiene:  Going to bed and waking up at consistent times, avoiding excessive daytime naps, avoiding caffeinated beverages in the evening, avoid excessive stimulation in the evening and generally using bed primarily for sleeping.  One hour before bedtime would recommend turning lights down lower, decreasing your activity (may read quietly, listen to music at a low volume). When you get into bed, should eliminate all technology (no texting, emailing, playing with your phone, iPad or tablet in bed).  [x] HYDRATION - Maintain good hydration.  Drink  2L of fluid a day (4 typical small water bottles)  [x] DIET - Maintain good nutrition. In particular don't skip meals and try and eat healthy balanced meals regularly.  [x] TRIGGERS - Look for other triggers and avoid them: Limit caffeine to 1-2 cups a day or less. Avoid dietary triggers that you have noticed bring on your headaches (this could include aged cheese, peanuts, MSG, aspartame and nitrates).  [x] EXERCISE - physical exercise as we all know is good for you in many ways, and not only is good for your heart, but also is beneficial for your mental health, cognitive health and  chronic pain/headaches. I would encourage at the least 5 days of physical exercise weekly for at least 30 minutes.     Education and Follow-up  [x] Please call with any questions or concerns. Of course if any new concerning symptoms go to the emergency department.  [x] Follow up in 1 year with Vignesh BHATIA     Subjective:    HPI      For Review:    The  patient was last seen in the office by myself on 05/23/2023.  She was presenting in evaluation regard to her moderate stenosis of the right MCA and also for her chronic daily headaches which she was experiencing.  In regard to the moderate stenosis of the right MCA, it was noted that the patient had moderate stenosis/narrowing on her most recent MRA of the head without contrast.  It is noted that in the patient's problem list she did have a history of TIA, and when further evaluating the extent of these episodes it did not seem that these were likely related to a TIA.  She never presented to the hospital when any specific strokelike symptoms she reports.  Also, these events were about 35 years ago in the past she states.  No chronic strokes were found on her recent MRI of the brain as well.  Was noted that for symptomatic stenosis of the patient continue on aspirin 81 mg daily for secondary stroke prevention.  As far as a statin medication goes, I was going to order a lipid panel and hemoglobin A1c to have this evaluated.  If the patient's LDL was less than 100 mg/dL, would not necessarily think that we would have to start a statin medication.  But if it was much higher than less than certainly given stroke risk factors I would start the statin medication moving forward.  Also wanted the patient to have a carotid artery ultrasound to evaluate for any significant carotid artery stenosis.    In regard to the patient's headaches, she stated that she has headaches that were daily more towards the back of the head.  She felt like a lot of these were related to extended stress that she had.  She stated that she would usually have them occur in the morning and that would calm down later towards the afternoon.  She noted that sometimes they were so severe the ibuprofen she was taking was not helping.  It seemed as though these were related to more of a chronic tension type headache, rather than a type of migraine headache.   Felt as though a lot of the stiffness/tightness that the patient experienced at the back of her neck area could be contributing to this.  Patient was not interested in any preventative medication at the last appointment and wanted to simply continue with her current lifestyle modifications at this point in time.  Although, stated that amitriptyline may be a good option for the patient for headache prevention.  Physical therapy and/or a muscle relaxant medication may also be consideration as well.  Also, did  recommend a few lifestyle modifications that she should try to implement.  Also wanted the patient to continue to think about having a home sleep study to evaluate for sleep apnea.  Wanted the patient to also see your ophthalmologist in regards to see if there was any increased pressure or swelling in her optic disc, although not having positional change headaches she does have early morning headaches.    Interval History:    New stroke symptoms/residual symptoms:    Any new, sudden onset weakness, numbness, facial droop, slurred speech, difficulty speaking, trouble swallowing, persistent vertigo, or sudden double vision or vision loss? No new stroke like symptoms     Residual symptoms include: None    Stroke Etiology and Risk Factor modification:    Stroke risk factors were evaluated including: Stenosis of the right middle cerebral artery, hypertension, hyperlipidemia    AP/AC therapy: aspirin 81 mg once daily. No bleeding or bruising issues at this time.     Statin therapy: No statin therapy at this time     Blood pressure today and as of late: 124/70 BP at this time. Not consistently checking at home.     Most recent LDL: 129 mg/dL    Most recent hemoglobin A1C: 6.0%    Cardiology evaluation? Any cardiac monitoring required?:  None    Endocrinology evaluation? Following proper glycemic treatment/diet?  None    Lifestyle history/modifications:    Diet/Exercise regimen: Has not been doing much exercise due to  knee pain at this time. She may need an MRI of the left knee, her right knee is starting to have issues as well. Not following a strict diet regimen at this time.     Any physical therapy, occupational therapy or speech therapy performed/required at this time?: No PT/OT    Any difficulty with sleep? No issues with sleep on a daily basis. 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    Any history of sleep apnea? CPAP compliance?: Does not want a sleep study at this moment in time.     Post-stroke depression/anxiety? No depression/anxiety in day to day basis, more stressful time of the year right now     Any history of smoking? Quit smoking 33 years ago     Additional History:    Having more headaches at this point in time. Not having daily headaches or at least not noticing them as much. Does take ibuprofen as needed for the headache when she has one. Usually starting at the base of the head and neck pressure, feels like a stiffness/tightness in her neck. Did not go see sleep medicine since the last time. Does not want to get tested for sleep apnea. Her  ophthalmologist said that she did not have any issues with her  vision, no issues with vision since getting new glasses.       Lab Results   Component Value Date/Time    HGBA1C 6.0 (H) 08/25/2023 07:52 AM     Lab Results   Component Value Date/Time     08/25/2023 07:52 AM           Past Medical History:   Diagnosis Date    Allergic     Asthma     Bilateral ovarian cysts     Cluster headache Unknown    Disease of thyroid gland     HYPOTHYROID    Eczema     Familial cavernous cerebral angioma (HCC)     Fibromyalgia     Fibromyalgia, primary Unknown    Headache, tension-type Unknown    Hypertension     Insomnia     MI (myocardial infarction) (HCC)     Migraines     Osteoarthritis     Polyarthropathy     Polymyalgia (HCC)     Psoriasis     TIA (transient ischemic attack)     Vitamin deficiency        Current Outpatient Medications:     albuterol  (PROVENTIL HFA,VENTOLIN HFA) 90 mcg/act inhaler, Inhale 2 puffs every 6 (six) hours as needed for wheezing or shortness of breath, Disp: 18 g, Rfl: 3    Ascorbic Acid (VITAMIN C) 1000 MG tablet, Take 1 tablet (1,000 mg total) by mouth daily, Disp: 90 tablet, Rfl: 1    aspirin (ECOTRIN LOW STRENGTH) 81 mg EC tablet, Take 81 mg by mouth daily, Disp: , Rfl:     Calcium Carb-Cholecalciferol (Calcium 600 + D) 600-5 MG-MCG TABS, Take 1 tablet by mouth 2 (two) times a day, Disp: , Rfl:     cetirizine (ZyrTEC) 10 mg tablet, Take 1 tablet (10 mg total) by mouth daily, Disp: 90 tablet, Rfl: 1    Cholecalciferol (Vitamin D3) 1.25 MG (80028 UT) CAPS, Take 1 capsule (50,000 Units total) by mouth every 28 days, Disp: 3 capsule, Rfl: 1    Cyanocobalamin (Vitamin B-12) 2500 MCG SUBL, 1 tab po every 3rd day, Disp: 90 tablet, Rfl: 1    fluticasone (Arnuity Ellipta) 100 MCG/ACT AEPB inhaler, Inhale 1 puff daily Rinse mouth after use., Disp: 30 blister, Rfl: 0    fluticasone (FLONASE) 50 mcg/act nasal spray, 1 spray each nostril 2 x daily x 1 month then prn congestion or allergies, Disp: 16 g, Rfl: 5    ibuprofen (MOTRIN) 600 mg tablet, TAKE 1 TABLET BY MOUTH 4 TIMES DAILY AS NEEDED WITH FOOD OR MILK FOR PAIN, Disp: , Rfl:     levothyroxine 75 mcg tablet, Take 1 tablet (75 mcg total) by mouth daily, Disp: 90 tablet, Rfl: 1    losartan-hydrochlorothiazide (HYZAAR) 100-12.5 MG per tablet, Take 1 tablet by mouth daily, Disp: 90 tablet, Rfl: 1    metoprolol succinate (TOPROL-XL) 50 mg 24 hr tablet, Take 1 tablet (50 mg total) by mouth daily, Disp: 90 tablet, Rfl: 1    Otezla 30 MG TABS, TAKE 1 TABLET BY MOUTH TWICE DAILY. TO START AFTER FINISHING STARTER PACK, Disp: , Rfl:     zinc gluconate 50 mg tablet, Take 50 mg by mouth daily, Disp: , Rfl:     famotidine (PEPCID) 40 MG tablet, Take 1 tablet (40 mg total) by mouth daily at bedtime, Disp: 30 tablet, Rfl: 5     Objective:    Physical Exam:                                                                  Vitals:            Constitutional:    /70 (BP Location: Left arm, Patient Position: Sitting, Cuff Size: Adult)   Pulse 65   Temp (!) 97.2 °F (36.2 °C) (Oral)   Wt 90.7 kg (200 lb)   SpO2 99%   BMI 35.43 kg/m²   BP Readings from Last 3 Encounters:   01/30/24 124/70   08/21/23 128/74   07/05/23 149/89     Pulse Readings from Last 3 Encounters:   01/30/24 65   08/21/23 77   07/05/23 73         Well developed, well nourished, well groomed. No dysmorphic features.       Psychiatric:  Normal behavior and appropriate affect        Neurological Examination:     Mental status/cognitive function:   Orientated to time, place and person. Recent and remote memory intact. Attention span and concentration as well as fund of knowledge are appropriate for age. Normal language and spontaneous speech.     Cranial Nerves:  II-visual fields full.   III, IV, VI-Pupils were equal, round, and reactive to light and accomodation. Extraocular movements were full and conjugate without nystagmus. Conjugate gaze, normal smooth pursuits, normal saccades   V-facial sensation symmetric.    VII-facial expression symmetric, intact forehead wrinkle, strong eye closure, symmetric smile    VIII-hearing grossly intact bilaterally   IX, X-palate elevation symmetric, no dysarthria.   XI-shoulder shrug strength intact    XII-tongue protrusion midline.    Motor Exam: symmetric bulk and tone throughout, no pronator drift. Power/strength 5/5 bilateral upper and lower extremities, no atrophy, fasciculations or abnormal movements noted.   Sensory: grossly intact light touch in all extremities.   Reflexes: brachioradialis 2+, biceps 2+, knee 1+, bilaterally  Coordination: Finger nose finger intact bilaterally, no apparent dysmetria, ataxia or tremor noted  Gait: steady casual and tandem gait.        ROS:    Review of Systems   Constitutional:  Negative for appetite change, fatigue and fever.   HENT: Negative.  Negative for hearing loss,  tinnitus, trouble swallowing and voice change.    Eyes: Negative.  Negative for photophobia, pain and visual disturbance.   Respiratory: Negative.  Negative for shortness of breath.    Cardiovascular: Negative.  Negative for palpitations.   Gastrointestinal: Negative.  Negative for nausea and vomiting.   Endocrine: Negative.  Negative for cold intolerance.   Genitourinary: Negative.  Negative for dysuria, frequency and urgency.   Musculoskeletal:  Negative for back pain, gait problem, myalgias, neck pain and neck stiffness.   Skin: Negative.  Negative for rash.   Allergic/Immunologic: Negative.    Neurological: Negative.  Negative for dizziness, tremors, seizures, syncope, facial asymmetry, speech difficulty, weakness, light-headedness, numbness and headaches.   Hematological: Negative.  Does not bruise/bleed easily.   Psychiatric/Behavioral: Negative.  Negative for confusion, hallucinations and sleep disturbance.    All other systems reviewed and are negative.      I have spent 30 minutes today on this case including chart review, performing history and exam, patient counseling, and documentation/communication      Jacob Ling PA-C  1/30/2024 9:10 AM

## 2024-02-09 ENCOUNTER — HOSPITAL ENCOUNTER (OUTPATIENT)
Dept: MRI IMAGING | Facility: HOSPITAL | Age: 64
End: 2024-02-09
Payer: COMMERCIAL

## 2024-02-09 DIAGNOSIS — I66.01 STENOSIS OF RIGHT MIDDLE CEREBRAL ARTERY: ICD-10-CM

## 2024-02-09 PROCEDURE — 70544 MR ANGIOGRAPHY HEAD W/O DYE: CPT

## 2024-02-09 PROCEDURE — G1004 CDSM NDSC: HCPCS

## 2024-02-26 ENCOUNTER — OFFICE VISIT (OUTPATIENT)
Dept: FAMILY MEDICINE CLINIC | Facility: CLINIC | Age: 64
End: 2024-02-26
Payer: COMMERCIAL

## 2024-02-26 VITALS
HEART RATE: 73 BPM | BODY MASS INDEX: 34.9 KG/M2 | RESPIRATION RATE: 16 BRPM | DIASTOLIC BLOOD PRESSURE: 80 MMHG | TEMPERATURE: 98.2 F | WEIGHT: 197 LBS | OXYGEN SATURATION: 98 % | SYSTOLIC BLOOD PRESSURE: 130 MMHG

## 2024-02-26 DIAGNOSIS — Z86.16 HISTORY OF 2019 NOVEL CORONAVIRUS DISEASE (COVID-19): ICD-10-CM

## 2024-02-26 DIAGNOSIS — E53.8 VITAMIN B12 DEFICIENCY: ICD-10-CM

## 2024-02-26 DIAGNOSIS — E03.9 ACQUIRED HYPOTHYROIDISM: ICD-10-CM

## 2024-02-26 DIAGNOSIS — R91.8 PULMONARY NODULES: ICD-10-CM

## 2024-02-26 DIAGNOSIS — M15.8 OTHER OSTEOARTHRITIS INVOLVING MULTIPLE JOINTS: ICD-10-CM

## 2024-02-26 DIAGNOSIS — Z12.11 COLON CANCER SCREENING: ICD-10-CM

## 2024-02-26 DIAGNOSIS — J30.89 SEASONAL ALLERGIC RHINITIS DUE TO OTHER ALLERGIC TRIGGER: ICD-10-CM

## 2024-02-26 DIAGNOSIS — E88.819 INSULIN RESISTANCE: ICD-10-CM

## 2024-02-26 DIAGNOSIS — R06.2 WHEEZING: ICD-10-CM

## 2024-02-26 DIAGNOSIS — Z13.1 SCREENING FOR DIABETES MELLITUS: ICD-10-CM

## 2024-02-26 DIAGNOSIS — E55.9 VITAMIN D DEFICIENCY: ICD-10-CM

## 2024-02-26 DIAGNOSIS — Z12.4 SCREENING FOR CERVICAL CANCER: ICD-10-CM

## 2024-02-26 DIAGNOSIS — Z13.0 SCREENING FOR DEFICIENCY ANEMIA: ICD-10-CM

## 2024-02-26 DIAGNOSIS — E78.2 MIXED HYPERLIPIDEMIA: ICD-10-CM

## 2024-02-26 DIAGNOSIS — L40.50 PSORIASIS WITH ARTHROPATHY (HCC): Primary | ICD-10-CM

## 2024-02-26 DIAGNOSIS — I10 ESSENTIAL HYPERTENSION: ICD-10-CM

## 2024-02-26 PROCEDURE — 99214 OFFICE O/P EST MOD 30 MIN: CPT | Performed by: NURSE PRACTITIONER

## 2024-02-26 RX ORDER — IBUPROFEN 600 MG/1
600 TABLET ORAL EVERY 8 HOURS PRN
Qty: 90 TABLET | Refills: 5 | Status: SHIPPED | OUTPATIENT
Start: 2024-02-26

## 2024-02-26 RX ORDER — FLUTICASONE PROPIONATE 50 MCG
SPRAY, SUSPENSION (ML) NASAL
Qty: 16 G | Refills: 5 | Status: SHIPPED | OUTPATIENT
Start: 2024-02-26

## 2024-02-26 RX ORDER — METOPROLOL SUCCINATE 50 MG/1
50 TABLET, EXTENDED RELEASE ORAL DAILY
Qty: 90 TABLET | Refills: 1 | Status: SHIPPED | OUTPATIENT
Start: 2024-02-26

## 2024-02-26 RX ORDER — LEVOTHYROXINE SODIUM 0.07 MG/1
75 TABLET ORAL DAILY
Qty: 90 TABLET | Refills: 1 | Status: SHIPPED | OUTPATIENT
Start: 2024-02-26

## 2024-02-26 RX ORDER — ALBUTEROL SULFATE 90 UG/1
2 AEROSOL, METERED RESPIRATORY (INHALATION) EVERY 6 HOURS PRN
Qty: 18 G | Refills: 3 | Status: SHIPPED | OUTPATIENT
Start: 2024-02-26

## 2024-02-26 RX ORDER — LOSARTAN POTASSIUM AND HYDROCHLOROTHIAZIDE 12.5; 1 MG/1; MG/1
1 TABLET ORAL DAILY
Qty: 90 TABLET | Refills: 1 | Status: SHIPPED | OUTPATIENT
Start: 2024-02-26

## 2024-02-26 NOTE — PROGRESS NOTES
Name: Giselle Cohn      : 1960      MRN: [id number]  Encounter Provider: ROBBI Lopez  Encounter Date: 2024   Encounter department: [facility]    Assessment & Plan     1. Psoriasis with arthropathy (HCC)    2. Essential hypertension  -     metoprolol succinate (TOPROL-XL) 50 mg 24 hr tablet; Take 1 tablet (50 mg total) by mouth daily  -     losartan-hydrochlorothiazide (HYZAAR) 100-12.5 MG per tablet; Take 1 tablet by mouth daily    3. Acquired hypothyroidism  -     levothyroxine 75 mcg tablet; Take 1 tablet (75 mcg total) by mouth daily  -     TSH, 3rd generation; Future; Expected date: 2024    4. Seasonal allergic rhinitis due to other allergic trigger  -     fluticasone (FLONASE) 50 mcg/act nasal spray; 1 spray each nostril 2 x daily x 1 month then prn congestion or allergies    5. History of 2019 novel coronavirus disease (COVID-19)  -     albuterol (PROVENTIL HFA,VENTOLIN HFA) 90 mcg/act inhaler; Inhale 2 puffs every 6 (six) hours as needed for wheezing or shortness of breath    6. Wheezing  -     albuterol (PROVENTIL HFA,VENTOLIN HFA) 90 mcg/act inhaler; Inhale 2 puffs every 6 (six) hours as needed for wheezing or shortness of breath    7. Insulin resistance  -     Comprehensive metabolic panel; Future; Expected date: 2024  -     Hemoglobin A1C; Future; Expected date: 2024  -     Insulin, fasting; Future; Expected date: 2024    8. Pulmonary nodules    9. Screening for cervical cancer  -     Ambulatory referral to Obstetrics / Gynecology; Future    10. Vitamin D deficiency  -     Vitamin D 25 hydroxy; Future; Expected date: 2024    11. Other osteoarthritis involving multiple joints  -     ibuprofen (MOTRIN) 600 mg tablet; Take 1 tablet (600 mg total) by mouth every 8 (eight) hours as needed for mild pain    12. Colon cancer screening  -     Cologuard    13. Vitamin B12 deficiency  -     Vitamin B12; Future; Expected date:  02/27/2024    14. Screening for diabetes mellitus  -     Comprehensive metabolic panel; Future; Expected date: 02/27/2024  -     Hemoglobin A1C; Future; Expected date: 02/27/2024  -     Insulin, fasting; Future; Expected date: 02/27/2024    15. Screening for deficiency anemia  -     CBC and differential; Future; Expected date: 02/27/2024    16. Mixed hyperlipidemia  -     Lipid panel; Future; Expected date: 02/27/2024        Depression Screening and Follow-up Plan: Patient was screened for depression during today's encounter. They screened negative with a PHQ-2 score of 0.        Subjective     Patient presents to office for follow up and recheck. Complete medical history and medications reviewed with patient and tolerating all medications well without any problems. Vital signs reviewed and stable.  Continues to be followed by [facility] for Hx Psoriasis Arthropathy. Currently being followed by [facility] for Hx Pulmonary Nodules.  Continues to be followed by [facility] for Cerebral Artery Stenosis.  Continues to be followed by [facility] for Esophageal Thickening.  Currently being followed by Dr. [facility] for Chronic B/L knee pain and had X-Ray of Knees done which showed Osteoarthritis.  Patient has swelling of Left Medial Knee Joint and Medial Tibial area causing pain.  Underwent X-Ray of Left Knee as ordered by Orthopedic who told patient this was due to Osteoarthritis.  Patient was told by Dr. [facility] to call if pain in left medial tibia area persists.  Denies any other problems or concerns at the present time.        Review of Systems  GENERAL:  Feels well, denies any significant changes in weight without trying.  SKIN:  Denies rashes, lesions, opened areas, wounds, change in moles or any other skin changes.  HEENT:  Denies any head injury or headaches.  Negative blurred vision, floaters, spots before eyes, infections, or other vision problems.  Negative  significant changes in vision or hearing.  Negative tinnitus, vertigo, or infections.  Negative hay fever, sinus trouble, nasal discharge, bloody noses, or problems with smell.  Negative sore throat, bleeding gums, ulcers, or sores.   NECK:  Denies lumps, goiter, pain, swollen glands, or lymphadenopathy.  BREASTS:  Denies lumps, pain, nipple discharge, swelling, redness, or any other changes.  RESPIRATORY:  Denies cough, wheezing, shortness of breath, dyspnea, or orthopnea.  CARDIOVASCULAR:  Denies chest pain or palpitations.   GASTROINTESTINAL:  Appetite good, denies nausea, vomiting, or indigestion.  Bowel movements normal occurring about once daily or every other day.  URINARY:  Denies frequency, urgency, incontinence, dysuria, hematuria, nocturia, or recent flank pain.   GENITAL:  Denies vaginal discharge, pelvic infection, lesions, ulcers, or pain.   Negative dyspareunia or abnormal vaginal bleeding.  PERIPHERAL VASCULAR:  Denies varicosities, swelling, skin changes, or pain.  MUSCULOSKELETAL:  Continues with generalized joint pain due to hx Psoriatic Arthropathy.  C/O pain and swelling in left medial knee/tibial area being followed by Orthopedic for.   PSYCHIATRIC:  Denies problems with depression, anxiety, anger, or other psychiatric symptoms.  NEUROLOGIC:  Denies fainting, dizziness, memory problems, seizures, tingling, motor or sensory loss.   HEMATOLOGIC:  Denies easy bruising, bleeding, or anemia.  ENDOCRINE:  Denies thyroid problems, temperature intolerance, excessive sweating, or other endocrine symptoms.      Past Medical History:   Diagnosis Date    Allergic     Asthma     Bilateral ovarian cysts     Cluster headache Unknown    Disease of thyroid gland     HYPOTHYROID    Eczema     Familial cavernous cerebral angioma (HCC)     Fibromyalgia     Fibromyalgia, primary Unknown    Headache, tension-type Unknown    Hypertension     Insomnia     MI (myocardial infarction) ([facility])     Migraines      Osteoarthritis     Polyarthropathy     Polymyalgia (HCC)     Psoriasis     TIA (transient ischemic attack)     Vitamin deficiency      Past Surgical History:   Procedure Laterality Date    DILATION AND CURETTAGE OF UTERUS      X 2    ENDOMETRIAL ABLATION W/ NOVASURE Bilateral 2010     Family History   Problem Relation Age of Onset    Breast cancer Mother 43    Hyperlipidemia Father     Anxiety disorder Sister     Rheum arthritis Sister     Asthma Sister     Depression Sister     Crohn's disease Sister     Lung cancer Sister     Sjogren's syndrome Daughter     Valvular heart disease Daughter     Cervical cancer Daughter     Arthritis Maternal Grandmother     Heart disease Maternal Grandmother     Stroke Maternal Grandmother     No Known Problems Maternal Grandfather     No Known Problems Paternal Grandmother     Valvular heart disease Paternal Grandfather     Stroke Maternal Aunt     No Known Problems Maternal Aunt     No Known Problems Maternal Aunt     No Known Problems Paternal Aunt     Breast cancer Paternal Aunt      Social History     Socioeconomic History    Marital status: /Civil Union     Spouse name: None    Number of children: None    Years of education: None    Highest education level: None   Occupational History    None   Tobacco Use    Smoking status: Former     Current packs/day: 0.00     Average packs/day: 1.5 packs/day for 15.0 years (22.5 ttl pk-yrs)     Types: Cigarettes     Start date: 1978     Quit date: 1991     Years since quittin.1    Smokeless tobacco: Never   Vaping Use    Vaping status: Never Used   Substance and Sexual Activity    Alcohol use: Not Currently     Comment: OCCASIOANLLY    Drug use: No    Sexual activity: Not Currently     Partners: Male     Birth control/protection: Other   Other Topics Concern    None   Social History Narrative    None     Social Determinants of Health     Financial Resource Strain: Not on file   Food Insecurity: Not on file    Transportation Needs: Not on file   Physical Activity: Not on file   Stress: Not on file   Social Connections: Not on file   Intimate Partner Violence: Not on file   Housing Stability: Not on file     Current Outpatient Medications on File Prior to Visit   Medication Sig    Ascorbic Acid (VITAMIN C) 1000 MG tablet Take 1 tablet (1,000 mg total) by mouth daily    aspirin (ECOTRIN LOW STRENGTH) 81 mg EC tablet Take 81 mg by mouth daily    atorvastatin (LIPITOR) 20 mg tablet Take 1 tablet (20 mg total) by mouth daily    Calcium Carb-Cholecalciferol (Calcium 600 + D) 600-5 MG-MCG TABS Take 1 tablet by mouth 2 (two) times a day    cetirizine (ZyrTEC) 10 mg tablet Take 1 tablet (10 mg total) by mouth daily    Cholecalciferol (Vitamin D3) 1.25 MG (72182 UT) CAPS Take 1 capsule (50,000 Units total) by mouth every 28 days    Cyanocobalamin (Vitamin B-12) 2500 MCG SUBL 1 tab po every 3rd day    famotidine (PEPCID) 40 MG tablet Take 1 tablet (40 mg total) by mouth daily at bedtime    Otezla 30 MG TABS TAKE 1 TABLET BY MOUTH TWICE DAILY. TO START AFTER FINISHING STARTER PACK    zinc gluconate 50 mg tablet Take 50 mg by mouth daily    [DISCONTINUED] albuterol (PROVENTIL HFA,VENTOLIN HFA) 90 mcg/act inhaler Inhale 2 puffs every 6 (six) hours as needed for wheezing or shortness of breath    [DISCONTINUED] fluticasone (FLONASE) 50 mcg/act nasal spray 1 spray each nostril 2 x daily x 1 month then prn congestion or allergies    [DISCONTINUED] ibuprofen (MOTRIN) 600 mg tablet TAKE 1 TABLET BY MOUTH 4 TIMES DAILY AS NEEDED WITH FOOD OR MILK FOR PAIN    [DISCONTINUED] levothyroxine 75 mcg tablet Take 1 tablet (75 mcg total) by mouth daily    [DISCONTINUED] losartan-hydrochlorothiazide (HYZAAR) 100-12.5 MG per tablet Take 1 tablet by mouth daily    [DISCONTINUED] metoprolol succinate (TOPROL-XL) 50 mg 24 hr tablet Take 1 tablet (50 mg total) by mouth daily    fluticasone (Arnuity Ellipta) 100 MCG/ACT AEPB inhaler Inhale 1 puff daily  Rinse mouth after use.     Allergies   Allergen Reactions    Other Cough    Pollen Extract Cough and Sneezing     Immunization History   Administered Date(s) Administered    INFLUENZA 10/17/2018, 10/21/2019    Influenza, seasonal, injectable, preservative free 10/17/2018       Objective     /80 (BP Location: Left arm, Patient Position: Sitting)   Pulse 73   Temp 98.2 °F (36.8 °C) (Tympanic)   Resp 16   Wt 89.4 kg (197 lb)   SpO2 98%   BMI 34.90 kg/m²     Physical Exam  Vitals and nursing note reviewed.       GENERAL:  Appears well nourished, well groomed, in no acute distress.  SKIN:  Palms warm, dry, color good.  Nails without clubbing or cyanosis.    No lesions, ulcerations, or wounds.  HEAD:  Hair is average texture.  Scalp without lesions, normocephalic, and atraumatic.  EARS:  B/L ear canals clear.  B/L TMs clear with + light reflex.    NOSE: Mucosa pink, moist, septum midline.  Negative sinus tenderness.   B/L turbinates pink, moist, non-edematous without exudate.   MOUTH:  Oral mucosa pink.  Pharynx pink, moist, without swelling, redness, or exudate.  Dentition ok.  Tongue midline.   NECK:  Supple, trachea midline, Negative thyromegaly, lymphadenopathy, or swollen glands.  LYMPH NODES:  Negative enlargement of neck, axillary, epitrochlear, or inguinal nodes.  THORAX/LUNGS  Thorax symmetric with good excursion.   Lungs resonant.  Breath sounds vesicular with no added sounds.    Diaphragm descends within normal limits.   CARDIOVASCULAR:  Carotid upstrokes brisk and without bruits.   Apical impulse discrete and tapping, barely palpable in the 5th ICS/MCL.    Normal S1 and Normal S2, Negative S3 or S4.    Negative murmurs, thrills, lifts, or heaves.  ABDOMEN:  Protuberant, bowel sounds normal active x 4 quadrants.    Negative tenderness.  Negative masses.  Negative hepatomegaly.  Negative splenomegaly.  Negative costovertebral tenderness.  EXTREMITIES:  Warm, calves supple, non-tender, negative for  edema.    Negative stasis pigmentation or ulcers.  +2 pulses throughout.  MUSCULOSKELETAL:  Negative joint deformities.  Good range of motion in hands, wrists, elbows, shoulders, spine, hips, and ankles. + tenderness with swelling of left medial knee and tibial area.  NEUROLOGICAL:  Mental status:  Awake, alert, and oriented to person, place, time, and event. Normal thought processes.        ROBBI Lopez

## 2024-03-14 ENCOUNTER — HOSPITAL ENCOUNTER (OUTPATIENT)
Dept: RADIOLOGY | Facility: CLINIC | Age: 64
End: 2024-03-14
Payer: COMMERCIAL

## 2024-03-14 VITALS — HEIGHT: 63 IN | BODY MASS INDEX: 34.91 KG/M2 | WEIGHT: 197 LBS

## 2024-03-14 DIAGNOSIS — Z12.31 ENCOUNTER FOR SCREENING MAMMOGRAM FOR MALIGNANT NEOPLASM OF BREAST: ICD-10-CM

## 2024-03-14 PROCEDURE — 77063 BREAST TOMOSYNTHESIS BI: CPT

## 2024-03-14 PROCEDURE — 77067 SCR MAMMO BI INCL CAD: CPT

## 2024-03-16 DIAGNOSIS — J30.89 SEASONAL ALLERGIC RHINITIS DUE TO OTHER ALLERGIC TRIGGER: ICD-10-CM

## 2024-03-16 DIAGNOSIS — J01.00 ACUTE NON-RECURRENT MAXILLARY SINUSITIS: ICD-10-CM

## 2024-03-17 RX ORDER — CETIRIZINE HYDROCHLORIDE 10 MG/1
10 TABLET, FILM COATED ORAL DAILY
Qty: 90 TABLET | Refills: 0 | Status: SHIPPED | OUTPATIENT
Start: 2024-03-17

## 2024-03-26 LAB — HBA1C MFR BLD HPLC: 5.9 %

## 2024-05-29 DIAGNOSIS — I66.01 STENOSIS OF RIGHT MIDDLE CEREBRAL ARTERY: ICD-10-CM

## 2024-05-29 DIAGNOSIS — E78.5 HYPERLIPIDEMIA: ICD-10-CM

## 2024-05-29 RX ORDER — ATORVASTATIN CALCIUM 20 MG/1
20 TABLET, FILM COATED ORAL DAILY
Qty: 30 TABLET | Refills: 3 | Status: SHIPPED | OUTPATIENT
Start: 2024-05-29

## 2024-06-24 DIAGNOSIS — J01.00 ACUTE NON-RECURRENT MAXILLARY SINUSITIS: ICD-10-CM

## 2024-06-24 DIAGNOSIS — J30.89 SEASONAL ALLERGIC RHINITIS DUE TO OTHER ALLERGIC TRIGGER: ICD-10-CM

## 2024-06-24 RX ORDER — CETIRIZINE HYDROCHLORIDE 10 MG/1
10 TABLET, FILM COATED ORAL DAILY
Qty: 90 TABLET | Refills: 1 | Status: SHIPPED | OUTPATIENT
Start: 2024-06-24

## 2024-09-09 ENCOUNTER — RA CDI HCC (OUTPATIENT)
Dept: OTHER | Facility: HOSPITAL | Age: 64
End: 2024-09-09

## 2024-09-16 ENCOUNTER — OFFICE VISIT (OUTPATIENT)
Dept: FAMILY MEDICINE CLINIC | Facility: CLINIC | Age: 64
End: 2024-09-16
Payer: COMMERCIAL

## 2024-09-16 VITALS
DIASTOLIC BLOOD PRESSURE: 88 MMHG | HEART RATE: 64 BPM | SYSTOLIC BLOOD PRESSURE: 138 MMHG | HEIGHT: 63 IN | BODY MASS INDEX: 35.12 KG/M2 | RESPIRATION RATE: 16 BRPM | TEMPERATURE: 97.5 F | WEIGHT: 198.2 LBS | OXYGEN SATURATION: 98 %

## 2024-09-16 DIAGNOSIS — E78.5 HYPERLIPIDEMIA: ICD-10-CM

## 2024-09-16 DIAGNOSIS — Z86.16 HISTORY OF 2019 NOVEL CORONAVIRUS DISEASE (COVID-19): ICD-10-CM

## 2024-09-16 DIAGNOSIS — G47.00 INSOMNIA, UNSPECIFIED TYPE: ICD-10-CM

## 2024-09-16 DIAGNOSIS — Z13.1 SCREENING FOR DIABETES MELLITUS: ICD-10-CM

## 2024-09-16 DIAGNOSIS — E03.9 ACQUIRED HYPOTHYROIDISM: ICD-10-CM

## 2024-09-16 DIAGNOSIS — Z12.4 SCREENING FOR CERVICAL CANCER: ICD-10-CM

## 2024-09-16 DIAGNOSIS — D18.02 VENOUS ANGIOMA OF BRAIN (HCC): ICD-10-CM

## 2024-09-16 DIAGNOSIS — I10 ESSENTIAL HYPERTENSION: ICD-10-CM

## 2024-09-16 DIAGNOSIS — E53.8 VITAMIN B12 DEFICIENCY: ICD-10-CM

## 2024-09-16 DIAGNOSIS — M79.661 RIGHT CALF PAIN: ICD-10-CM

## 2024-09-16 DIAGNOSIS — Z00.00 ENCOUNTER FOR ANNUAL PHYSICAL EXAM: Primary | ICD-10-CM

## 2024-09-16 DIAGNOSIS — M15.8 OTHER OSTEOARTHRITIS INVOLVING MULTIPLE JOINTS: ICD-10-CM

## 2024-09-16 DIAGNOSIS — M79.661 PAIN AND SWELLING OF RIGHT LOWER LEG: ICD-10-CM

## 2024-09-16 DIAGNOSIS — M25.50 POLYARTHRALGIA: ICD-10-CM

## 2024-09-16 DIAGNOSIS — M25.461 PAIN AND SWELLING OF RIGHT KNEE: ICD-10-CM

## 2024-09-16 DIAGNOSIS — R91.8 PULMONARY NODULES: ICD-10-CM

## 2024-09-16 DIAGNOSIS — R09.89 HOMANS SIGN PRESENT: ICD-10-CM

## 2024-09-16 DIAGNOSIS — R06.2 WHEEZING: ICD-10-CM

## 2024-09-16 DIAGNOSIS — I83.891 VARICOSE VEINS OF RIGHT LOWER EXTREMITY WITH OTHER COMPLICATIONS: ICD-10-CM

## 2024-09-16 DIAGNOSIS — I66.01 STENOSIS OF RIGHT MIDDLE CEREBRAL ARTERY: ICD-10-CM

## 2024-09-16 DIAGNOSIS — E88.819 INSULIN RESISTANCE: ICD-10-CM

## 2024-09-16 DIAGNOSIS — M35.3 POLYMYALGIA (HCC): ICD-10-CM

## 2024-09-16 DIAGNOSIS — E55.9 VITAMIN D DEFICIENCY: ICD-10-CM

## 2024-09-16 DIAGNOSIS — M79.89 PAIN AND SWELLING OF RIGHT LOWER LEG: ICD-10-CM

## 2024-09-16 DIAGNOSIS — J30.89 SEASONAL ALLERGIC RHINITIS DUE TO OTHER ALLERGIC TRIGGER: ICD-10-CM

## 2024-09-16 DIAGNOSIS — E78.2 MIXED HYPERLIPIDEMIA: ICD-10-CM

## 2024-09-16 DIAGNOSIS — M25.561 PAIN AND SWELLING OF RIGHT KNEE: ICD-10-CM

## 2024-09-16 DIAGNOSIS — Z13.0 SCREENING FOR DEFICIENCY ANEMIA: ICD-10-CM

## 2024-09-16 DIAGNOSIS — L40.50 PSORIASIS WITH ARTHROPATHY (HCC): ICD-10-CM

## 2024-09-16 PROCEDURE — 99213 OFFICE O/P EST LOW 20 MIN: CPT | Performed by: NURSE PRACTITIONER

## 2024-09-16 PROCEDURE — 99396 PREV VISIT EST AGE 40-64: CPT | Performed by: NURSE PRACTITIONER

## 2024-09-16 RX ORDER — LOSARTAN POTASSIUM AND HYDROCHLOROTHIAZIDE 12.5; 1 MG/1; MG/1
1 TABLET ORAL DAILY
Qty: 90 TABLET | Refills: 1 | Status: SHIPPED | OUTPATIENT
Start: 2024-09-16

## 2024-09-16 RX ORDER — IBUPROFEN 600 MG/1
600 TABLET, FILM COATED ORAL EVERY 8 HOURS PRN
Qty: 90 TABLET | Refills: 5 | Status: SHIPPED | OUTPATIENT
Start: 2024-09-16

## 2024-09-16 RX ORDER — METOPROLOL SUCCINATE 50 MG/1
50 TABLET, EXTENDED RELEASE ORAL DAILY
Qty: 90 TABLET | Refills: 1 | Status: SHIPPED | OUTPATIENT
Start: 2024-09-16

## 2024-09-16 RX ORDER — MELATONIN 5 MG
5 TABLET,CHEWABLE ORAL
Start: 2024-09-16

## 2024-09-16 RX ORDER — LEVOTHYROXINE SODIUM 75 UG/1
75 TABLET ORAL DAILY
Qty: 90 TABLET | Refills: 1 | Status: SHIPPED | OUTPATIENT
Start: 2024-09-16

## 2024-09-16 RX ORDER — FLUTICASONE PROPIONATE 50 MCG
SPRAY, SUSPENSION (ML) NASAL
Qty: 16 G | Refills: 5 | Status: SHIPPED | OUTPATIENT
Start: 2024-09-16

## 2024-09-16 RX ORDER — ADALIMUMAB 40MG/0.4ML
40 KIT SUBCUTANEOUS
COMMUNITY
Start: 2024-06-21

## 2024-09-16 RX ORDER — ALBUTEROL SULFATE 90 UG/1
2 INHALANT RESPIRATORY (INHALATION) EVERY 6 HOURS PRN
Qty: 18 G | Refills: 3 | Status: SHIPPED | OUTPATIENT
Start: 2024-09-16

## 2024-09-16 RX ORDER — ATORVASTATIN CALCIUM 20 MG/1
20 TABLET, FILM COATED ORAL DAILY
Qty: 30 TABLET | Refills: 3 | Status: SHIPPED | OUTPATIENT
Start: 2024-09-16

## 2024-09-16 NOTE — ASSESSMENT & PLAN NOTE
Orders:    albuterol (PROVENTIL HFA,VENTOLIN HFA) 90 mcg/act inhaler; Inhale 2 puffs every 6 (six) hours as needed for wheezing or shortness of breath

## 2024-09-16 NOTE — PATIENT INSTRUCTIONS
"Patient Education     Routine physical for adults   The Basics   Written by the doctors and editors at [facility]   What is a physical? -- A physical is a routine visit, or \"check-up,\" with your doctor. You might also hear it called a \"wellness visit\" or \"preventive visit.\"  During each visit, the doctor will:   Ask about your physical and mental health   Ask about your habits, behaviors, and lifestyle   Do an exam   Give you vaccines if needed   Talk to you about any medicines you take   Give advice about your health   Answer your questions  Getting regular check-ups is an important part of taking care of your health. It can help your doctor find and treat any problems you have. But it's also important for preventing health problems.  A routine physical is different from a \"sick visit.\" A sick visit is when you see a doctor because of a health concern or problem. Since physicals are scheduled ahead of time, you can think about what you want to ask the doctor.  How often should I get a physical? -- It depends on your age and health. In general, for people age 21 years and older:   If you are younger than 50 years, you might be able to get a physical every 3 years.   If you are 50 years or older, your doctor might recommend a physical every year.  If you have an ongoing health condition, like diabetes or high blood pressure, your doctor will probably want to see you more often.  What happens during a physical? -- In general, each visit will include:   Physical exam - The doctor or nurse will check your height, weight, heart rate, and blood pressure. They will also look at your eyes and ears. They will ask about how you are feeling and whether you have any symptoms that bother you.   Medicines - It's a good idea to bring a list of all the medicines you take to each doctor visit. Your doctor will talk to you about your medicines and answer any questions. Tell them if you are having any side effects that bother you. You " "should also tell them if you are having trouble paying for any of your medicines.   Habits and behaviors - This includes:   Your diet   Your exercise habits   Whether you smoke, drink alcohol, or use drugs   Whether you are sexually active   Whether you feel safe at home  Your doctor will talk to you about things you can do to improve your health and lower your risk of health problems. They will also offer help and support. For example, if you want to quit smoking, they can give you advice and might prescribe medicines. If you want to improve your diet or get more physical activity, they can help you with this, too.   Lab tests, if needed - The tests you get will depend on your age and situation. For example, your doctor might want to check your:   Cholesterol   Blood sugar   Iron level   Vaccines - The recommended vaccines will depend on your age, health, and what vaccines you already had. Vaccines are very important because they can prevent certain serious or deadly infections.   Discussion of screening - \"Screening\" means checking for diseases or other health problems before they cause symptoms. Your doctor can recommend screening based on your age, risk, and preferences. This might include tests to check for:   Cancer, such as breast, prostate, cervical, ovarian, colorectal, prostate, lung, or skin cancer   Sexually transmitted infections, such as chlamydia and gonorrhea   Mental health conditions like depression and anxiety  Your doctor will talk to you about the different types of screening tests. They can help you decide which screenings to have. They can also explain what the results might mean.   Answering questions - The physical is a good time to ask the doctor or nurse questions about your health. If needed, they can refer you to other doctors or specialists, too.  Adults older than 65 years often need other care, too. As you get older, your doctor will talk to you about:   How to prevent falling at " home   Hearing or vision tests   Memory testing   How to take your medicines safely   Making sure that you have the help and support you need at home  All topics are updated as new evidence becomes available and our peer review process is complete.  This topic retrieved from [employer] on: May 02, 2024.  Topic 790116 Version 1.0  Release: 32.4.3 - C32.122  © 2024 UpToDate, Inc. and/or its affiliates. All rights reserved.  Consumer Information Use and Disclaimer   Disclaimer: This generalized information is a limited summary of diagnosis, treatment, and/or medication information. It is not meant to be comprehensive and should be used as a tool to help the user understand and/or assess potential diagnostic and treatment options. It does NOT include all information about conditions, treatments, medications, side effects, or risks that may apply to a specific patient. It is not intended to be medical advice or a substitute for the medical advice, diagnosis, or treatment of a health care provider based on the health care provider's examination and assessment of a patient's specific and unique circumstances. Patients must speak with a health care provider for complete information about their health, medical questions, and treatment options, including any risks or benefits regarding use of medications. This information does not endorse any treatments or medications as safe, effective, or approved for treating a specific patient. UpToDate, Inc. and its affiliates disclaim any warranty or liability relating to this information or the use thereof.The use of this information is governed by the Terms of Use, available at https://www.[employer].com/en/know/clinical-effectiveness-terms. 2024© UpToDate, Inc. and its affiliates and/or licensors. All rights reserved.  Copyright   © 2024 UpToDate, Inc. and/or its affiliates. All rights reserved.

## 2024-09-16 NOTE — ASSESSMENT & PLAN NOTE
Orders:    Comprehensive metabolic panel; Future    Hemoglobin A1C; Future    Insulin, fasting; Future

## 2024-09-16 NOTE — ASSESSMENT & PLAN NOTE
Orders:    metoprolol succinate (TOPROL-XL) 50 mg 24 hr tablet; Take 1 tablet (50 mg total) by mouth daily    losartan-hydrochlorothiazide (HYZAAR) 100-12.5 MG per tablet; Take 1 tablet by mouth daily

## 2024-09-16 NOTE — ASSESSMENT & PLAN NOTE
Orders:    fluticasone (FLONASE) 50 mcg/act nasal spray; 1 spray each nostril 2 x daily x 1 month then prn congestion or allergies

## 2024-09-16 NOTE — PROGRESS NOTES
Adult Annual Physical  Name: Giselle Cohn      : 1960      MRN: [id number]  Encounter Provider: ROBBI Lopez  Encounter Date: 2024   Encounter department: [facility]    Assessment & Plan  Encounter for annual physical exam         Screening for cervical cancer    Orders:    Ambulatory referral to Obstetrics / Gynecology; Future    Vitamin D deficiency    Orders:    Vitamin D 25 hydroxy; Future    Essential hypertension    Orders:    metoprolol succinate (TOPROL-XL) 50 mg 24 hr tablet; Take 1 tablet (50 mg total) by mouth daily    losartan-hydrochlorothiazide (HYZAAR) 100-12.5 MG per tablet; Take 1 tablet by mouth daily    Acquired hypothyroidism    Orders:    TSH, 3rd generation; Future    levothyroxine 75 mcg tablet; Take 1 tablet (75 mcg total) by mouth daily    Polymyalgia (HCC)         Polyarthralgia         Psoriasis with arthropathy (HCC)         Vitamin B12 deficiency    Orders:    Vitamin B12; Future    Other osteoarthritis involving multiple joints    Orders:    ibuprofen (MOTRIN) 600 mg tablet; Take 1 tablet (600 mg total) by mouth every 8 (eight) hours as needed for mild pain    Mixed hyperlipidemia    Orders:    Lipid panel; Future    Insulin resistance    Orders:    Comprehensive metabolic panel; Future    Hemoglobin A1C; Future    Insulin, fasting; Future    Pulmonary nodules         Varicose veins of right lower extremity with other complications    Orders:    VAS VENOUS DUPLEX -LOWER LIMB UNILATERAL; Future    VAS ARTERIAL DUPLEX-LOWER LIMB UNILATERAL; Future    Right calf pain    Orders:    VAS VENOUS DUPLEX -LOWER LIMB UNILATERAL; Future    VAS ARTERIAL DUPLEX-LOWER LIMB UNILATERAL; Future    Pain and swelling of right lower leg    Orders:    VAS VENOUS DUPLEX -LOWER LIMB UNILATERAL; Future    VAS ARTERIAL DUPLEX-LOWER LIMB UNILATERAL; Future    MRI knee right  wo contrast; Future    Homans sign present    Orders:    VAS VENOUS DUPLEX -LOWER LIMB  UNILATERAL; Future    VAS ARTERIAL DUPLEX-LOWER LIMB UNILATERAL; Future    Venous angioma of brain (HCC)         Pain and swelling of right knee    Orders:    MRI knee right  wo contrast; Future    Screening for deficiency anemia    Orders:    CBC and differential; Future    Screening for diabetes mellitus    Orders:    Comprehensive metabolic panel; Future    Hemoglobin A1C; Future    Insulin, fasting; Future    Insomnia, unspecified type    Orders:    Melatonin 5 MG CHEW; Chew 5 mg daily at bedtime    Stenosis of right middle cerebral artery    Orders:    atorvastatin (LIPITOR) 20 mg tablet; Take 1 tablet (20 mg total) by mouth daily    Hyperlipidemia    Orders:    atorvastatin (LIPITOR) 20 mg tablet; Take 1 tablet (20 mg total) by mouth daily    Seasonal allergic rhinitis due to other allergic trigger    Orders:    fluticasone (FLONASE) 50 mcg/act nasal spray; 1 spray each nostril 2 x daily x 1 month then prn congestion or allergies    History of 2019 novel coronavirus disease (COVID-19)    Orders:    albuterol (PROVENTIL HFA,VENTOLIN HFA) 90 mcg/act inhaler; Inhale 2 puffs every 6 (six) hours as needed for wheezing or shortness of breath    Wheezing    Orders:    albuterol (PROVENTIL HFA,VENTOLIN HFA) 90 mcg/act inhaler; Inhale 2 puffs every 6 (six) hours as needed for wheezing or shortness of breath    Immunizations and preventive care screenings were discussed with patient today. Appropriate education was printed on patient's after visit summary.    Counseling:  Dental Health: discussed importance of regular tooth brushing, flossing, and dental visits.  Injury prevention: discussed safety/seat belts, safety helmets, smoke detectors, carbon dioxide detectors, and smoking near bedding or upholstery.  Exercise: the importance of regular exercise/physical activity was discussed. Recommend exercise 3-5 times per week for at least 30 minutes.       Depression Screening and Follow-up Plan: Patient was screened for  depression during today's encounter. They screened negative with a PHQ-2 score of 0.        History of Present Illness     Adult Annual Physical:  Patient presents for annual physical. Patient presents to office for annual physical exam.  Complete medical history and medications reviewed with patient and tolerating all medications well without any problems. Vital signs reviewed and stable. C/O swelling on side of right knee and has pain when sitting down and driving.  Patient states over a year ago she underwent Gel Injections and had the knee drained which helped the pain and swelling until June 2024.  In August 2024 patient was seen by Dr. Goldberg [facility] who did X-Ray that showed moderate Osteoarthritis.  Dr. Goldberg did drain the right knee again and did a steroid injection which helped the pain somewhat but continues to have pain and swelling of right lower extremity.  C/O pain in back of calf.  C/O difficulty staying asleep states she tosses and turns when she awakens.  Denies any problems falling asleep.  Denies any other problems or concerns at the present time. .     Diet and Physical Activity:  - Diet/Nutrition: well balanced diet.  - Exercise: walking.    Depression Screening:  - PHQ-2 Score: 0    General Health:  - Sleep: sleeps poorly and 4-6 hours of sleep on average.  - Hearing: normal hearing left ear and normal hearing right ear.  - Vision: wears glasses and most recent eye exam < 1 year ago.  - Dental: brushes teeth three times daily, brushes teeth twice daily and regular dental visits.    /GYN Health:  - Follows with GYN: no.   - Menopause: postmenopausal.   - History of STDs: no    Advanced Care Planning:  - Has an advanced directive?: no    - Has a durable medical POA?: no    - ACP document given to patient?: no      Review of Systems  GENERAL:  Feels well, denies any significant changes in weight without trying. C/O difficulty staying asleep.   SKIN:  Denies rashes, lesions, opened  areas, wounds, change in moles or any other skin changes.  HEENT:  Denies any head injury or headaches.  Negative blurred vision, floaters, spots before eyes, infections, or other vision problems.  Negative significant changes in vision or hearing.  Negative tinnitus, vertigo, or infections.  Negative hay fever, sinus trouble, nasal discharge, bloody noses, or problems with smell.  Negative sore throat, bleeding gums, ulcers, or sores.   NECK:  Denies lumps, goiter, pain, swollen glands, or lymphadenopathy.  BREASTS:  Denies lumps, pain, nipple discharge, swelling, redness, or any other changes.  RESPIRATORY:  Denies cough, wheezing, shortness of breath, dyspnea, or orthopnea.  CARDIOVASCULAR:  Denies chest pain or palpitations.   GASTROINTESTINAL:  Appetite good, denies nausea, vomiting, or indigestion.  Bowel movements normal occurring about once daily or every other day.  URINARY:  Denies frequency, urgency, incontinence, dysuria, hematuria, nocturia, or recent flank pain.   GENITAL:  Denies vaginal discharge, pelvic infection, lesions, ulcers, or pain.   Negative dyspareunia or abnormal vaginal bleeding.  PERIPHERAL VASCULAR:  Denies varicosities, swelling, skin changes, or pain.  MUSCULOSKELETAL:  Denies back, joint, or muscle pain.    Negative problems with mobility or movement. C/O swelling of right knee and lower extremity with pain from right knee up thigh and down right tibial area.    PSYCHIATRIC:  Denies problems with depression, anxiety, anger, or other psychiatric symptoms.  NEUROLOGIC:  Denies fainting, dizziness, memory problems, seizures, tingling, motor or sensory loss.   HEMATOLOGIC:  Denies easy bruising, bleeding, or anemia.  ENDOCRINE:  Denies thyroid problems, temperature intolerance, excessive sweating, or other endocrine symptoms.    Medical History Reviewed by provider this encounter:  Tobacco       Current Outpatient Medications on File Prior to Visit   Medication Sig Dispense Refill     Adalimumab (Humira, 2 Pen,) 40 MG/0.4ML PNKT 40 mg every 28 days      Ascorbic Acid (VITAMIN C) 1000 MG tablet Take 1 tablet (1,000 mg total) by mouth daily 90 tablet 1    aspirin (ECOTRIN LOW STRENGTH) 81 mg EC tablet Take 81 mg by mouth daily      Calcium Carb-Cholecalciferol (Calcium 600 + D) 600-5 MG-MCG TABS Take 1 tablet by mouth 2 (two) times a day      cetirizine (EQ Allergy Relief, Cetirizine,) 10 mg tablet Take 1 tablet by mouth once daily 90 tablet 1    Cholecalciferol (Vitamin D3) 1.25 MG (19233 UT) CAPS Take 1 capsule (50,000 Units total) by mouth every 28 days 3 capsule 1    Cyanocobalamin (Vitamin B-12) 2500 MCG SUBL 1 tab po every 3rd day 90 tablet 1    famotidine (PEPCID) 40 MG tablet Take 1 tablet (40 mg total) by mouth daily at bedtime (Patient taking differently: Take 40 mg by mouth as needed) 30 tablet 5    zinc gluconate 50 mg tablet Take 50 mg by mouth daily      [DISCONTINUED] albuterol (PROVENTIL HFA,VENTOLIN HFA) 90 mcg/act inhaler Inhale 2 puffs every 6 (six) hours as needed for wheezing or shortness of breath 18 g 3    [DISCONTINUED] atorvastatin (LIPITOR) 20 mg tablet Take 1 tablet by mouth once daily 30 tablet 3    [DISCONTINUED] fluticasone (FLONASE) 50 mcg/act nasal spray 1 spray each nostril 2 x daily x 1 month then prn congestion or allergies 16 g 5    [DISCONTINUED] ibuprofen (MOTRIN) 600 mg tablet Take 1 tablet (600 mg total) by mouth every 8 (eight) hours as needed for mild pain 90 tablet 5    [DISCONTINUED] levothyroxine 75 mcg tablet Take 1 tablet (75 mcg total) by mouth daily 90 tablet 1    [DISCONTINUED] losartan-hydrochlorothiazide (HYZAAR) 100-12.5 MG per tablet Take 1 tablet by mouth daily 90 tablet 1    [DISCONTINUED] metoprolol succinate (TOPROL-XL) 50 mg 24 hr tablet Take 1 tablet (50 mg total) by mouth daily 90 tablet 1    [DISCONTINUED] fluticasone (Arnuity Ellipta) 100 MCG/ACT AEPB inhaler Inhale 1 puff daily Rinse mouth after use. 30 blister 0    [DISCONTINUED]  "Otezla 30 MG TABS TAKE 1 TABLET BY MOUTH TWICE DAILY. TO START AFTER FINISHING STARTER PACK       No current facility-administered medications on file prior to visit.      Social History     Tobacco Use    Smoking status: Former     Current packs/day: 0.00     Average packs/day: 1.5 packs/day for 15.0 years (22.5 ttl pk-yrs)     Types: Cigarettes     Start date: 1978     Quit date: 1991     Years since quittin.7    Smokeless tobacco: Never   Vaping Use    Vaping status: Never Used   Substance and Sexual Activity    Alcohol use: Not Currently     Comment: OCCASIOANLLY    Drug use: No    Sexual activity: Not Currently     Partners: Male     Birth control/protection: Other       Objective     /88 (BP Location: Left arm, Patient Position: Sitting)   Pulse 64   Temp 97.5 °F (36.4 °C) (Tympanic)   Resp 16   Ht 5' 3\" (1.6 m)   Wt 89.9 kg (198 lb 3.2 oz)   SpO2 98%   BMI 35.11 kg/m²     Physical Exam  Vitals and nursing note reviewed.     GENERAL:  Appears well nourished, well groomed, in no acute distress.  SKIN:  Palms warm, dry, color good.  Nails without clubbing or cyanosis.    No lesions, ulcerations, or wounds.  HEAD:  Hair is average texture.  Scalp without lesions, normocephalic, and atraumatic.  EARS:  B/L ear canals clear.  B/L TMs clear with + light reflex.    NOSE: Mucosa pink, moist, septum midline.  Negative sinus tenderness.   B/L turbinates pink, moist, non-edematous without exudate.   MOUTH:  Oral mucosa pink.  Pharynx pink, moist, without swelling, redness, or exudate.  Dentition ok.  Tongue midline.   NECK:  Supple, trachea midline, Negative thyromegaly, lymphadenopathy, or swollen glands.  LYMPH NODES:  Negative enlargement of neck, axillary, epitrochlear, or inguinal nodes.  THORAX/LUNGS  Thorax symmetric with good excursion.   Lungs resonant.  Breath sounds vesicular with no added sounds.    Diaphragm descends within normal limits.   CARDIOVASCULAR:  Carotid upstrokes brisk " and without bruits.   Apical impulse discrete and tapping, barely palpable in the 5th ICS/MCL.    Normal S1 and Normal S2, Negative S3 or S4.    Negative murmurs, thrills, lifts, or heaves.  ABDOMEN:  Protuberant, bowel sounds normal active x 4 quadrants.    Negative tenderness.  Negative masses.  Negative hepatomegaly.  Negative splenomegaly.  Negative costovertebral tenderness.  EXTREMITIES:  Warm, calves supple.  + tenderness of right knee joint upon palpation.  + tenderness of right calf and swelling noted of right lower extremity.     + Nicholas's Sign Right Leg.   + Varicosities Right lower extremity.  +2 pulses throughout.  MUSCULOSKELETAL:  Negative joint deformities.    Good range of motion in hands, wrists, elbows, shoulders, spine, hips, knees, and ankles.  NEUROLOGICAL:  Mental status:  Awake, alert, and oriented to person, place, time, and event. Normal thought processes.         Administrative Statements   I have spent a total time of 20 minutes in caring for this patient on the day of the visit/encounter including Risks and benefits of tx options, Instructions for management, Patient and family education, Importance of tx compliance, Risk factor reductions, Documenting in the medical record, Reviewing / ordering tests, medicine, procedures  , and Obtaining or reviewing history  .

## 2024-09-16 NOTE — ASSESSMENT & PLAN NOTE
Orders:    TSH, 3rd generation; Future    levothyroxine 75 mcg tablet; Take 1 tablet (75 mcg total) by mouth daily

## 2024-09-24 ENCOUNTER — APPOINTMENT (OUTPATIENT)
Dept: NON INVASIVE DIAGNOSTICS | Facility: HOSPITAL | Age: 64
End: 2024-09-24
Payer: COMMERCIAL

## 2024-09-24 ENCOUNTER — HOSPITAL ENCOUNTER (OUTPATIENT)
Dept: NON INVASIVE DIAGNOSTICS | Facility: HOSPITAL | Age: 64
Discharge: HOME | End: 2024-09-24
Payer: COMMERCIAL

## 2024-09-24 DIAGNOSIS — M79.89 PAIN AND SWELLING OF RIGHT LOWER LEG: ICD-10-CM

## 2024-09-24 DIAGNOSIS — M79.661 PAIN AND SWELLING OF RIGHT LOWER LEG: ICD-10-CM

## 2024-09-24 DIAGNOSIS — I83.891 VARICOSE VEINS OF RIGHT LOWER EXTREMITY WITH OTHER COMPLICATIONS: ICD-10-CM

## 2024-09-24 DIAGNOSIS — R09.89 HOMANS SIGN PRESENT: ICD-10-CM

## 2024-09-24 DIAGNOSIS — M79.661 RIGHT CALF PAIN: ICD-10-CM

## 2024-09-24 PROCEDURE — 93971 EXTREMITY STUDY: CPT

## 2024-09-24 PROCEDURE — 93971 EXTREMITY STUDY: CPT | Performed by: SURGERY

## 2024-09-30 ENCOUNTER — TELEPHONE (OUTPATIENT)
Dept: FAMILY MEDICINE CLINIC | Facility: CLINIC | Age: 64
End: 2024-09-30

## 2024-09-30 NOTE — TELEPHONE ENCOUNTER
----- Message from ROBBI Lopez sent at 9/27/2024  5:52 PM EDT -----  Please notify patient her Venous Duplex is Negative for blood clots.

## 2024-10-09 ENCOUNTER — HOSPITAL ENCOUNTER (OUTPATIENT)
Dept: MRI IMAGING | Facility: HOSPITAL | Age: 64
Discharge: HOME | End: 2024-10-09
Payer: COMMERCIAL

## 2024-10-09 DIAGNOSIS — M25.461 PAIN AND SWELLING OF RIGHT KNEE: ICD-10-CM

## 2024-10-09 DIAGNOSIS — M79.89 PAIN AND SWELLING OF RIGHT LOWER LEG: ICD-10-CM

## 2024-10-09 DIAGNOSIS — M25.561 PAIN AND SWELLING OF RIGHT KNEE: ICD-10-CM

## 2024-10-09 DIAGNOSIS — M79.661 PAIN AND SWELLING OF RIGHT LOWER LEG: ICD-10-CM

## 2024-10-09 PROCEDURE — 73721 MRI JNT OF LWR EXTRE W/O DYE: CPT

## 2024-10-16 ENCOUNTER — TELEPHONE (OUTPATIENT)
Dept: FAMILY MEDICINE CLINIC | Facility: CLINIC | Age: 64
End: 2024-10-16

## 2024-10-16 DIAGNOSIS — G89.29 CHRONIC PAIN OF RIGHT KNEE: ICD-10-CM

## 2024-10-16 DIAGNOSIS — M25.561 CHRONIC PAIN OF RIGHT KNEE: ICD-10-CM

## 2024-10-16 DIAGNOSIS — S83.206A ACUTE MENISCAL TEAR OF RIGHT KNEE, INITIAL ENCOUNTER: Primary | ICD-10-CM

## 2024-10-16 DIAGNOSIS — M17.11 PRIMARY OSTEOARTHRITIS OF RIGHT KNEE: ICD-10-CM

## 2024-10-16 NOTE — TELEPHONE ENCOUNTER
----- Message from ROBBI Lopez sent at 10/16/2024  3:56 PM EDT -----  Please notify patient her MRI of Knee shows meniscal tear, arthritis, and fluid in the knee. I placed a referral to Orthopedic for evaluation and treatment of this for her.

## 2024-11-15 ENCOUNTER — TELEPHONE (OUTPATIENT)
Dept: FAMILY MEDICINE CLINIC | Facility: CLINIC | Age: 64
End: 2024-11-15

## 2024-11-15 NOTE — TELEPHONE ENCOUNTER
Spoke to pt and she said she never received her Cologuard kit in the mail.  Upon checking in her chart, it does not look like [facility] had ordered one for her.  I asked her if she would like [facility] to put in an order for her, and she declined.

## 2024-12-13 ENCOUNTER — APPOINTMENT (OUTPATIENT)
Dept: LAB | Facility: CLINIC | Age: 64
End: 2024-12-13
Payer: COMMERCIAL

## 2024-12-13 DIAGNOSIS — E78.2 MIXED HYPERLIPIDEMIA: ICD-10-CM

## 2024-12-13 DIAGNOSIS — Z13.0 SCREENING FOR DEFICIENCY ANEMIA: ICD-10-CM

## 2024-12-13 DIAGNOSIS — E88.819 INSULIN RESISTANCE: ICD-10-CM

## 2024-12-13 DIAGNOSIS — E53.8 VITAMIN B12 DEFICIENCY: ICD-10-CM

## 2024-12-13 DIAGNOSIS — E55.9 VITAMIN D DEFICIENCY: ICD-10-CM

## 2024-12-13 DIAGNOSIS — E03.9 ACQUIRED HYPOTHYROIDISM: ICD-10-CM

## 2024-12-13 DIAGNOSIS — Z13.1 SCREENING FOR DIABETES MELLITUS: ICD-10-CM

## 2024-12-13 LAB
25(OH)D3 SERPL-MCNC: 47.9 NG/ML (ref 30–100)
ALBUMIN SERPL BCG-MCNC: 4.6 G/DL (ref 3.5–5)
ALP SERPL-CCNC: 91 U/L (ref 34–104)
ALT SERPL W P-5'-P-CCNC: 22 U/L (ref 7–52)
ANION GAP SERPL CALCULATED.3IONS-SCNC: 7 MMOL/L (ref 4–13)
AST SERPL W P-5'-P-CCNC: 24 U/L (ref 13–39)
BASOPHILS # BLD AUTO: 0.12 THOUSANDS/ÂΜL (ref 0–0.1)
BASOPHILS NFR BLD AUTO: 2 % (ref 0–1)
BILIRUB SERPL-MCNC: 0.54 MG/DL (ref 0.2–1)
BUN SERPL-MCNC: 19 MG/DL (ref 5–25)
CALCIUM SERPL-MCNC: 9.6 MG/DL (ref 8.4–10.2)
CHLORIDE SERPL-SCNC: 102 MMOL/L (ref 96–108)
CHOLEST SERPL-MCNC: 143 MG/DL (ref ?–200)
CO2 SERPL-SCNC: 32 MMOL/L (ref 21–32)
CREAT SERPL-MCNC: 0.97 MG/DL (ref 0.6–1.3)
EOSINOPHIL # BLD AUTO: 0.24 THOUSAND/ÂΜL (ref 0–0.61)
EOSINOPHIL NFR BLD AUTO: 4 % (ref 0–6)
ERYTHROCYTE [DISTWIDTH] IN BLOOD BY AUTOMATED COUNT: 12.4 % (ref 11.6–15.1)
EST. AVERAGE GLUCOSE BLD GHB EST-MCNC: 128 MG/DL
GFR SERPL CREATININE-BSD FRML MDRD: 62 ML/MIN/1.73SQ M
GLUCOSE P FAST SERPL-MCNC: 105 MG/DL (ref 65–99)
HBA1C MFR BLD: 6.1 %
HCT VFR BLD AUTO: 41.9 % (ref 34.8–46.1)
HDLC SERPL-MCNC: 79 MG/DL
HGB BLD-MCNC: 13.7 G/DL (ref 11.5–15.4)
IMM GRANULOCYTES # BLD AUTO: 0.02 THOUSAND/UL (ref 0–0.2)
IMM GRANULOCYTES NFR BLD AUTO: 0 % (ref 0–2)
INSULIN SERPL-ACNC: 15.03 UIU/ML (ref 1.9–23)
LDLC SERPL CALC-MCNC: 50 MG/DL (ref 0–100)
LYMPHOCYTES # BLD AUTO: 2.21 THOUSANDS/ÂΜL (ref 0.6–4.47)
LYMPHOCYTES NFR BLD AUTO: 37 % (ref 14–44)
MCH RBC QN AUTO: 29.1 PG (ref 26.8–34.3)
MCHC RBC AUTO-ENTMCNC: 32.7 G/DL (ref 31.4–37.4)
MCV RBC AUTO: 89 FL (ref 82–98)
MONOCYTES # BLD AUTO: 0.49 THOUSAND/ÂΜL (ref 0.17–1.22)
MONOCYTES NFR BLD AUTO: 8 % (ref 4–12)
NEUTROPHILS # BLD AUTO: 2.9 THOUSANDS/ÂΜL (ref 1.85–7.62)
NEUTS SEG NFR BLD AUTO: 49 % (ref 43–75)
NONHDLC SERPL-MCNC: 64 MG/DL
NRBC BLD AUTO-RTO: 0 /100 WBCS
PLATELET # BLD AUTO: 330 THOUSANDS/UL (ref 149–390)
PMV BLD AUTO: 9.4 FL (ref 8.9–12.7)
POTASSIUM SERPL-SCNC: 4.6 MMOL/L (ref 3.5–5.3)
PROT SERPL-MCNC: 7.3 G/DL (ref 6.4–8.4)
RBC # BLD AUTO: 4.7 MILLION/UL (ref 3.81–5.12)
SODIUM SERPL-SCNC: 141 MMOL/L (ref 135–147)
TRIGL SERPL-MCNC: 71 MG/DL (ref ?–150)
TSH SERPL DL<=0.05 MIU/L-ACNC: 5.33 UIU/ML (ref 0.45–4.5)
VIT B12 SERPL-MCNC: 727 PG/ML (ref 180–914)
WBC # BLD AUTO: 5.98 THOUSAND/UL (ref 4.31–10.16)

## 2024-12-13 PROCEDURE — 82607 VITAMIN B-12: CPT

## 2024-12-13 PROCEDURE — 80053 COMPREHEN METABOLIC PANEL: CPT

## 2024-12-13 PROCEDURE — 85025 COMPLETE CBC W/AUTO DIFF WBC: CPT

## 2024-12-13 PROCEDURE — 83525 ASSAY OF INSULIN: CPT

## 2024-12-13 PROCEDURE — 80061 LIPID PANEL: CPT

## 2024-12-13 PROCEDURE — 36415 COLL VENOUS BLD VENIPUNCTURE: CPT

## 2024-12-13 PROCEDURE — 82306 VITAMIN D 25 HYDROXY: CPT

## 2024-12-13 PROCEDURE — 83036 HEMOGLOBIN GLYCOSYLATED A1C: CPT

## 2024-12-13 PROCEDURE — 84443 ASSAY THYROID STIM HORMONE: CPT

## 2024-12-16 ENCOUNTER — RESULTS FOLLOW-UP (OUTPATIENT)
Dept: FAMILY MEDICINE CLINIC | Facility: CLINIC | Age: 64
End: 2024-12-16

## 2024-12-16 DIAGNOSIS — E03.9 ACQUIRED HYPOTHYROIDISM: Primary | ICD-10-CM

## 2025-01-11 DIAGNOSIS — J01.00 ACUTE NON-RECURRENT MAXILLARY SINUSITIS: ICD-10-CM

## 2025-01-11 DIAGNOSIS — J30.89 SEASONAL ALLERGIC RHINITIS DUE TO OTHER ALLERGIC TRIGGER: ICD-10-CM

## 2025-01-13 RX ORDER — CETIRIZINE HYDROCHLORIDE 10 MG/1
10 TABLET ORAL DAILY
Qty: 90 TABLET | Refills: 1 | Status: SHIPPED | OUTPATIENT
Start: 2025-01-13

## 2025-01-13 RX ORDER — CETIRIZINE HYDROCHLORIDE 10 MG/1
10 TABLET, FILM COATED ORAL DAILY
Qty: 90 TABLET | Refills: 1 | Status: SHIPPED | OUTPATIENT
Start: 2025-01-13 | End: 2025-01-13 | Stop reason: SDUPTHER

## 2025-01-30 ENCOUNTER — OFFICE VISIT (OUTPATIENT)
Dept: NEUROLOGY | Facility: CLINIC | Age: 65
End: 2025-01-30
Payer: COMMERCIAL

## 2025-01-30 VITALS
TEMPERATURE: 97.7 F | SYSTOLIC BLOOD PRESSURE: 142 MMHG | OXYGEN SATURATION: 98 % | BODY MASS INDEX: 37.54 KG/M2 | DIASTOLIC BLOOD PRESSURE: 80 MMHG | WEIGHT: 211.9 LBS | HEART RATE: 69 BPM

## 2025-01-30 DIAGNOSIS — R51.9 CHRONIC INTRACTABLE HEADACHE, UNSPECIFIED HEADACHE TYPE: ICD-10-CM

## 2025-01-30 DIAGNOSIS — E78.5 HYPERLIPIDEMIA: ICD-10-CM

## 2025-01-30 DIAGNOSIS — G89.29 CHRONIC INTRACTABLE HEADACHE, UNSPECIFIED HEADACHE TYPE: ICD-10-CM

## 2025-01-30 DIAGNOSIS — I66.01 STENOSIS OF RIGHT MIDDLE CEREBRAL ARTERY: Primary | ICD-10-CM

## 2025-01-30 DIAGNOSIS — I10 ESSENTIAL HYPERTENSION: ICD-10-CM

## 2025-01-30 PROCEDURE — 99213 OFFICE O/P EST LOW 20 MIN: CPT

## 2025-01-30 NOTE — PROGRESS NOTES
Name: Giselle Cohn      : 1960      MRN: [id number]  Encounter Provider: Jacob Ling PA-C  Encounter Date: 2025   Encounter department: [facility] [place]  :  Assessment & Plan  Stenosis of right middle cerebral artery  I had the pleasure of seeing Giselle today in the office at [facility] and by phone.  She is presenting today for an office visit follow-up appointment in regard to her previous history of stenosis of right middle cerebral artery and chronic headaches.  Patient states that at this time her headaches have been relatively under control.  She has about 1-2 significant headache days a month and she takes 600 mg of ibuprofen for the headaches to abort them when they are occurring.  The patient still continues to not have any new strokelike symptoms.  She continues with aspirin 81 mg daily and atorvastatin 20 mg daily.  Her blood pressure was slightly elevated today in the office to 142/80.  She does take metoprolol and losartan-hydrochlorothiazide combination medication.  She notes that she did not take her medication yet as of today.  Her most recent LDL reading was 50 mg/dL most recent hemoglobin A1c was 6.1%.  On the patient's most recent MRA of the head without contrast performed in 2024, showed no significant progression of the stenosis of the right middle cerebral artery.  Therefore, at this time would like for the patient to repeat this imaging in 2026.  Do not believe that the patient needs to have this imaging completed on a yearly basis with taking the appropriate antiplatelet and statin medication.  Outside of this, no other issues or concerns, advised patient to follow-up in approximately 1 year time with Vignesh BHATIA.    - At this time, patient has been doing well in regards to her headaches.  Notes about 1-2 significant headache days out of the month.  She currently takes ibuprofen 600 mg when she does  have a headache to abort them.  No other significant issues at this time.  Still encouraged patient to continue with adequate hydration, regular physical activity, adequate sleep at night, and trying to reduce stress/anxiety to potentially help with headaches as well.  - MRA head without contrast ordered to be completed in February 2026.  Do not believe that the patient necessarily needs to have this imaging on a once yearly basis.  Therefore will defer to the next year.    - For ongoing stroke prevention continue: aspirin 81 mg once daily, atorvastatin 20 mg once daily    - Discussed the importance of antiplatelet/anticoagulation management with the patient to prevent future strokes.   - Recommend to check blood pressure occasionally away from the doctor's office to make sure that those numbers are typically less than 130/80.  If they are frequently higher than that, we recommend checking a little more often and to follow up with primary care team   - Will defer to primary care team for monitoring of cholesterol panel and blood sugar numbers with target LDL cholesterol of less than 70 and hemoglobin A1c less than 7%  - Recommend following a low salt, mediterranean diet   - Recommend routine physical exercise as tolerated     Orders:    MRA head wo contrast; Future    Hyperlipidemia  - LDL 50 mg/dL  - Continue atorvastatin 20 mg daily  - Follow-up with PCP in regards to lipid panel monitoring in the future       Essential hypertension  - BP today in the office 142/80  - Currently taking metoprolol and losartan-hydrochlorothiazide combination for antihypertensive therapy  - Continue to follow with PCP in regards to antihypertensive therapy and blood pressure monitoring, ideally targeting less than 130/80 for secondary stroke prevention       Chronic intractable headache, unspecified headache type  - Headaches relatively well under control, 1-2 headaches a month  - Continue ibuprofen 600 mg as needed for abortive  therapy         Patient Instructions   - At this time, patient has been doing well in regards to her headaches.  Notes about 1-2 significant headache days out of the month.  She currently takes ibuprofen 600 mg when she does have a headache to abort them.  No other significant issues at this time.  Still encouraged patient to continue with adequate hydration, regular physical activity, adequate sleep at night, and trying to reduce stress/anxiety to potentially help with headaches as well.  - MRA head without contrast ordered to be completed in February 2026.  Do not believe that the patient necessarily needs to have this imaging on a once yearly basis.  Therefore will defer to the next year.    - For ongoing stroke prevention continue: aspirin 81 mg once daily, atorvastatin 20 mg once daily    - Discussed the importance of antiplatelet/anticoagulation management with the patient to prevent future strokes.   - Recommend to check blood pressure occasionally away from the doctor's office to make sure that those numbers are typically less than 130/80.  If they are frequently higher than that, we recommend checking a little more often and to follow up with primary care team   - Will defer to primary care team for monitoring of cholesterol panel and blood sugar numbers with target LDL cholesterol of less than 70 and hemoglobin A1c less than 7%  - Recommend following a low salt, mediterranean diet   - Recommend routine physical exercise as tolerated     We will plan for her to return to the office in 1 year time to see on of the APPs or Dr. Elliott but would be happy to see her sooner if the need should arise.  If she has any symptoms concerning for TIA or stroke including sudden painless loss of vision or double vision, difficulty speaking or swallowing, vertigo/room spinning that does not quickly resolve, or weakness/numbness/loss of coordination affecting 1 side of the face or body she should proceed by ambulance to  the nearest emergency room immediately.      History of Present Illness   HPI    For Review:    The patient was last seen on 01/30/2024 by myself.  At that time the last appointment patient was following up for right MCA artery stenosis and chronic daily headaches.  The time it was noted that the headaches the patient was having were more manageable.  She was not really having any daily headaches and it was difficult for her to recall the last time she was having significant headaches.  Did talk to patient about physical therapy for the head and neck area but she did not want to proceed at that time.  She was still taking aspirin 81 mg daily and we are going to start Lipitor 20 mg daily.  Her most recent LDL was 129 mg/dL and recommended that due to stroke risk factors that the patient start statin medication to reduce stroke risk in the future.  Even though the patient did not have a stroke there had been a distant past history of TIA it was appropriate to start the medication.  Recommend that the patient have repeat lipid panel in about 2 to 3 months time to see how the medication was working for her.  Also ordered repeat MRI head without contrast to further evaluate the patient's right middle cerebral artery stenosis at the previous appointment.      Interval History:    New stroke symptoms/residual symptoms:    Any new, sudden onset weakness, numbness, facial droop, slurred speech, difficulty speaking, trouble swallowing, persistent vertigo, or sudden double vision or vision loss? No new stroke-like symptoms     Residual symptoms include: None    Stroke Etiology and Risk Factor modification:    Stroke risk factors were evaluated including: Stenosis of the right middle cerebral artery, hypertension, hyperlipidemia     AP/AC therapy: aspirin 81 mg once daily. No bleeding or bruising issues,     Statin therapy: atorvastatin 20 mg once daily     Blood pressure today and as of late: 142/80 BP today in the office  today. She does take metoprolol and losartan-hydrochlorothiazide combination med. Had not taken her blood pressure medication yet today. Does not check her blood pressure at home.    Most recent LDL: 50 mg/dL    Most recent hemoglobin A1C: 6.1%    Cardiology evaluation? Any cardiac monitoring required?: None    Endocrinology evaluation? Following proper glycemic treatment/diet?: None    Vascular surgery evaluation? Monitoring of carotid artery ultrasound required? None    Lifestyle history/modifications:    Diet/Exercise regimen: Has been trying to work on her diet she states. Did have some difficulty with her knees previously. Had trouble walking for awhile. Looking into potential partial replacement of the knee possibly. Looking at potentially going back to the gym in the future     Any physical therapy, occupational therapy or speech therapy performed/required at this time?: No PT/OT    Any difficulty with sleeping? Any history of sleep apnea? CPAP compliance?: Does toss and turn throughout the night when sleeping she states     Post-stroke depression/anxiety? No depression/anxiety, does just have more work related stress at this time    Any history of smoking or tobacco usage?: Quit smoking 33 years ago     Additional History:     1-2 times a month where she experiences headaches. She does take ibuprofen 600 mg which seems to abort them relatively quickly.     Review of Systems I have personally reviewed the MA's review of systems and made changes as necessary.    Medical History Reviewed by provider this encounter:  Tobacco  Allergies  Meds  Problems  Med Hx  Surg Hx  Fam Hx     .  Past Medical History   Past Medical History:   Diagnosis Date    Allergic     Asthma     Bilateral ovarian cysts     Cluster headache Unknown    Disease of thyroid gland     HYPOTHYROID    Eczema     Familial cavernous cerebral angioma (HCC)     Fibromyalgia     Fibromyalgia, primary Unknown    Headache, tension-type Unknown     Hypertension     Insomnia     MI (myocardial infarction) (HCC)     Migraines     Osteoarthritis     Polyarthropathy     Polymyalgia (HCC)     Psoriasis     TIA (transient ischemic attack)     Vitamin deficiency      Past Surgical History:   Procedure Laterality Date    DILATION AND CURETTAGE OF UTERUS      X 2    ENDOMETRIAL ABLATION W/ NOVASURE Bilateral 2010     Family History   Problem Relation Age of Onset    Breast cancer Mother 43    Hyperlipidemia Father     Anxiety disorder Sister     Rheum arthritis Sister     Asthma Sister     Depression Sister     Crohn's disease Sister     Lung cancer Sister     Sjogren's syndrome Daughter     Valvular heart disease Daughter     Cervical cancer Daughter     Arthritis Maternal Grandmother     Heart disease Maternal Grandmother     Stroke Maternal Grandmother     No Known Problems Maternal Grandfather     No Known Problems Paternal Grandmother     Valvular heart disease Paternal Grandfather     Stroke Maternal Aunt     No Known Problems Maternal Aunt     No Known Problems Maternal Aunt     No Known Problems Paternal Aunt     Breast cancer Paternal Aunt       reports that she quit smoking about 34 years ago. Her smoking use included cigarettes. She started smoking about 47 years ago. She has a 22.5 pack-year smoking history. She has never used smokeless tobacco. She reports that she does not currently use alcohol. She reports that she does not use drugs.  Current Outpatient Medications on File Prior to Visit   Medication Sig Dispense Refill    Adalimumab (Humira, 2 Pen,) 40 MG/0.4ML PNKT 40 mg every 28 days      albuterol (PROVENTIL HFA,VENTOLIN HFA) 90 mcg/act inhaler Inhale 2 puffs every 6 (six) hours as needed for wheezing or shortness of breath 18 g 3    Ascorbic Acid (VITAMIN C) 1000 MG tablet Take 1 tablet (1,000 mg total) by mouth daily 90 tablet 1    aspirin (ECOTRIN LOW STRENGTH) 81 mg EC tablet Take 81 mg by mouth daily      atorvastatin (LIPITOR) 20 mg tablet  Take 1 tablet (20 mg total) by mouth daily 30 tablet 3    Calcium Carb-Cholecalciferol (Calcium 600 + D) 600-5 MG-MCG TABS Take 1 tablet by mouth 2 (two) times a day      cetirizine (EQ Allergy Relief, Cetirizine,) 10 mg tablet Take 1 tablet (10 mg total) by mouth daily 90 tablet 1    Cholecalciferol (Vitamin D3) 1.25 MG (81674 UT) CAPS Take 1 capsule (50,000 Units total) by mouth every 28 days 3 capsule 1    Cyanocobalamin (Vitamin B-12) 2500 MCG SUBL 1 tab po every 3rd day 90 tablet 1    famotidine (PEPCID) 40 MG tablet Take 1 tablet (40 mg total) by mouth daily at bedtime 30 tablet 5    fluticasone (FLONASE) 50 mcg/act nasal spray 1 spray each nostril 2 x daily x 1 month then prn congestion or allergies 16 g 5    ibuprofen (MOTRIN) 600 mg tablet Take 1 tablet (600 mg total) by mouth every 8 (eight) hours as needed for mild pain 90 tablet 5    levothyroxine 75 mcg tablet Take 1 tablet (75 mcg total) by mouth daily 90 tablet 1    losartan-hydrochlorothiazide (HYZAAR) 100-12.5 MG per tablet Take 1 tablet by mouth daily 90 tablet 1    Melatonin 5 MG CHEW Chew 5 mg daily at bedtime      metoprolol succinate (TOPROL-XL) 50 mg 24 hr tablet Take 1 tablet (50 mg total) by mouth daily 90 tablet 1    zinc gluconate 50 mg tablet Take 50 mg by mouth daily       No current facility-administered medications on file prior to visit.     Allergies   Allergen Reactions    Other Cough    Pollen Extract Cough and Sneezing      Current Outpatient Medications on File Prior to Visit   Medication Sig Dispense Refill    Adalimumab (Humira, 2 Pen,) 40 MG/0.4ML PNKT 40 mg every 28 days      albuterol (PROVENTIL HFA,VENTOLIN HFA) 90 mcg/act inhaler Inhale 2 puffs every 6 (six) hours as needed for wheezing or shortness of breath 18 g 3    Ascorbic Acid (VITAMIN C) 1000 MG tablet Take 1 tablet (1,000 mg total) by mouth daily 90 tablet 1    aspirin (ECOTRIN LOW STRENGTH) 81 mg EC tablet Take 81 mg by mouth daily      atorvastatin (LIPITOR) 20  mg tablet Take 1 tablet (20 mg total) by mouth daily 30 tablet 3    Calcium Carb-Cholecalciferol (Calcium 600 + D) 600-5 MG-MCG TABS Take 1 tablet by mouth 2 (two) times a day      cetirizine (EQ Allergy Relief, Cetirizine,) 10 mg tablet Take 1 tablet (10 mg total) by mouth daily 90 tablet 1    Cholecalciferol (Vitamin D3) 1.25 MG (82413 UT) CAPS Take 1 capsule (50,000 Units total) by mouth every 28 days 3 capsule 1    Cyanocobalamin (Vitamin B-12) 2500 MCG SUBL 1 tab po every 3rd day 90 tablet 1    famotidine (PEPCID) 40 MG tablet Take 1 tablet (40 mg total) by mouth daily at bedtime 30 tablet 5    fluticasone (FLONASE) 50 mcg/act nasal spray 1 spray each nostril 2 x daily x 1 month then prn congestion or allergies 16 g 5    ibuprofen (MOTRIN) 600 mg tablet Take 1 tablet (600 mg total) by mouth every 8 (eight) hours as needed for mild pain 90 tablet 5    levothyroxine 75 mcg tablet Take 1 tablet (75 mcg total) by mouth daily 90 tablet 1    losartan-hydrochlorothiazide (HYZAAR) 100-12.5 MG per tablet Take 1 tablet by mouth daily 90 tablet 1    Melatonin 5 MG CHEW Chew 5 mg daily at bedtime      metoprolol succinate (TOPROL-XL) 50 mg 24 hr tablet Take 1 tablet (50 mg total) by mouth daily 90 tablet 1    zinc gluconate 50 mg tablet Take 50 mg by mouth daily       No current facility-administered medications on file prior to visit.      Social History     Tobacco Use    Smoking status: Former     Current packs/day: 0.00     Average packs/day: 1.5 packs/day for 15.0 years (22.5 ttl pk-yrs)     Types: Cigarettes     Start date: 1978     Quit date: 1991     Years since quittin.1    Smokeless tobacco: Never   Vaping Use    Vaping status: Never Used   Substance and Sexual Activity    Alcohol use: Not Currently     Comment: OCCASIOANLLY    Drug use: No    Sexual activity: Not Currently     Partners: Male     Birth control/protection: Other        Objective   /80 (BP Location: Right arm, Patient Position:  Sitting, Cuff Size: Large)   Pulse 69   Temp 97.7 °F (36.5 °C) (Temporal)   Wt 96.1 kg (211 lb 14.4 oz)   SpO2 98%   BMI 37.54 kg/m²     Physical Exam  Neurological Exam    Physical Exam:                                                                 Vitals:            Constitutional:    /80 (BP Location: Right arm, Patient Position: Sitting, Cuff Size: Large)   Pulse 69   Temp 97.7 °F (36.5 °C) (Temporal)   Wt 96.1 kg (211 lb 14.4 oz)   SpO2 98%   BMI 37.54 kg/m²   BP Readings from Last 3 Encounters:   01/30/25 142/80   09/16/24 138/88   02/26/24 130/80     Pulse Readings from Last 3 Encounters:   01/30/25 69   09/16/24 64   02/26/24 73         Well developed, well nourished, well groomed. No dysmorphic features.       Psychiatric:  Normal behavior and appropriate affect        Neurological Examination:     Mental status/cognitive function:   Orientated to time, place and person. Recent and remote memory intact. Attention span and concentration as well as fund of knowledge are appropriate for age. Normal language and spontaneous speech.    Cranial Nerves:  II-visual fields full.   III, IV, VI-Pupils were equal, round, and reactive to light and accomodation. Extraocular movements were full and conjugate without nystagmus. Conjugate gaze, normal smooth pursuits, normal saccades   V-facial sensation symmetric.    VII-facial expression symmetric, intact forehead wrinkle, strong eye closure, symmetric smile    VIII-hearing grossly intact bilaterally   IX, X-palate elevation symmetric, no dysarthria.   XI-shoulder shrug strength intact    XII-tongue protrusion midline.    Motor Exam: symmetric bulk and tone throughout, no pronator drift. Power/strength 5/5 bilateral upper and lower extremities, no atrophy, fasciculations or abnormal movements noted.   Sensory: grossly intact light touch in all extremities.   Reflexes: brachioradialis 2+, biceps 2+, knee 1+ bilaterally  Coordination: Finger nose finger  intact bilaterally, no apparent dysmetria, ataxia or tremor noted  Gait: steady casual and tandem gait.      Results/Data:    MRA head without contrast, 02/09/2024:    IMPRESSION:     Nonspecific short segment beaded narrowing of the first branch of the M1 segment of the left middle cerebral artery estimated up to 60% luminal diameter narrowing and considered hemodynamically significant; not significantly changed as compared to the   prior MRA. Finding is likely due to atherosclerosis as there is significant atherosclerotic calcified plaque burden at the origin of the left renal artery on the 2/27/2023 chest CT.    Radiology Results Review: I have reviewed radiology reports from 02/09/2024 including: MRI brain.    Administrative Statements   I have spent a total time of 20 minutes in caring for this patient on the day of the visit/encounter including Diagnostic results, Risks and benefits of tx options, Instructions for management, Patient and family education, Importance of tx compliance, Risk factor reductions, Impressions, Counseling / Coordination of care, Documenting in the medical record, Reviewing / ordering tests, medicine, procedures  , and Obtaining or reviewing history  .

## 2025-01-30 NOTE — ASSESSMENT & PLAN NOTE
I had the pleasure of seeing Giselle today in the office at [facility] neurology [facility] and by phone.  She is presenting today for an office visit follow-up appointment in regard to her previous history of stenosis of right middle cerebral artery and chronic headaches.  Patient states that at this time her headaches have been relatively under control.  She has about 1-2 significant headache days a month and she takes 600 mg of ibuprofen for the headaches to abort them when they are occurring.  The patient still continues to not have any new strokelike symptoms.  She continues with aspirin 81 mg daily and atorvastatin 20 mg daily.  Her blood pressure was slightly elevated today in the office to 142/80.  She does take metoprolol and losartan-hydrochlorothiazide combination medication.  She notes that she did not take her medication yet as of today.  Her most recent LDL reading was 50 mg/dL most recent hemoglobin A1c was 6.1%.  On the patient's most recent MRA of the head without contrast performed in February 2024, showed no significant progression of the stenosis of the right middle cerebral artery.  Therefore, at this time would like for the patient to repeat this imaging in February 2026.  Do not believe that the patient needs to have this imaging completed on a yearly basis with taking the appropriate antiplatelet and statin medication.  Outside of this, no other issues or concerns, advised patient to follow-up in approximately 1 year time with Vignesh BHATIA.    - At this time, patient has been doing well in regards to her headaches.  Notes about 1-2 significant headache days out of the month.  She currently takes ibuprofen 600 mg when she does have a headache to abort them.  No other significant issues at this time.  Still encouraged patient to continue with adequate hydration, regular physical activity, adequate sleep at night, and trying to reduce stress/anxiety to potentially help with headaches as well.  - MRA  head without contrast ordered to be completed in February 2026.  Do not believe that the patient necessarily needs to have this imaging on a once yearly basis.  Therefore will defer to the next year.    - For ongoing stroke prevention continue: aspirin 81 mg once daily, atorvastatin 20 mg once daily    - Discussed the importance of antiplatelet/anticoagulation management with the patient to prevent future strokes.   - Recommend to check blood pressure occasionally away from the doctor's office to make sure that those numbers are typically less than 130/80.  If they are frequently higher than that, we recommend checking a little more often and to follow up with primary care team   - Will defer to primary care team for monitoring of cholesterol panel and blood sugar numbers with target LDL cholesterol of less than 70 and hemoglobin A1c less than 7%  - Recommend following a low salt, mediterranean diet   - Recommend routine physical exercise as tolerated     Orders:    MRA head wo contrast; Future

## 2025-01-30 NOTE — PROGRESS NOTES
Review of Systems   Constitutional:  Negative for appetite change, fatigue and fever.   HENT: Negative.  Negative for hearing loss, tinnitus, trouble swallowing and voice change.    Eyes:  Positive for photophobia and visual disturbance (blurry vision). Negative for pain.   Respiratory: Negative.  Negative for shortness of breath.    Cardiovascular: Negative.  Negative for palpitations.   Gastrointestinal:  Positive for nausea. Negative for vomiting.   Endocrine: Negative.  Negative for cold intolerance.   Genitourinary: Negative.  Negative for dysuria, frequency and urgency.   Musculoskeletal:  Negative for back pain, gait problem, myalgias, neck pain and neck stiffness.   Skin: Negative.  Negative for rash.   Allergic/Immunologic: Negative.    Neurological:  Positive for dizziness and headaches (1 HA  ibuprofen 600 mg helps). Negative for tremors, seizures, syncope, facial asymmetry, speech difficulty, weakness, light-headedness and numbness.   Hematological: Negative.  Does not bruise/bleed easily.   Psychiatric/Behavioral: Negative.  Negative for confusion, hallucinations and sleep disturbance.    All other systems reviewed and are negative.

## 2025-01-30 NOTE — ASSESSMENT & PLAN NOTE
- BP today in the office 142/80  - Currently taking metoprolol and losartan-hydrochlorothiazide combination for antihypertensive therapy  - Continue to follow with PCP in regards to antihypertensive therapy and blood pressure monitoring, ideally targeting less than 130/80 for secondary stroke prevention

## 2025-01-30 NOTE — PATIENT INSTRUCTIONS
- At this time, patient has been doing well in regards to her headaches.  Notes about 1-2 significant headache days out of the month.  She currently takes ibuprofen 600 mg when she does have a headache to abort them.  No other significant issues at this time.  Still encouraged patient to continue with adequate hydration, regular physical activity, adequate sleep at night, and trying to reduce stress/anxiety to potentially help with headaches as well.  - MRA head without contrast ordered to be completed in February 2026.  Do not believe that the patient necessarily needs to have this imaging on a once yearly basis.  Therefore will defer to the next year.    - For ongoing stroke prevention continue: aspirin 81 mg once daily, atorvastatin 20 mg once daily    - Discussed the importance of antiplatelet/anticoagulation management with the patient to prevent future strokes.   - Recommend to check blood pressure occasionally away from the doctor's office to make sure that those numbers are typically less than 130/80.  If they are frequently higher than that, we recommend checking a little more often and to follow up with primary care team   - Will defer to primary care team for monitoring of cholesterol panel and blood sugar numbers with target LDL cholesterol of less than 70 and hemoglobin A1c less than 7%  - Recommend following a low salt, mediterranean diet   - Recommend routine physical exercise as tolerated     We will plan for her to return to the office in 1 year time to see on of the APPs or Dr. Elliott but would be happy to see her sooner if the need should arise.  If she has any symptoms concerning for TIA or stroke including sudden painless loss of vision or double vision, difficulty speaking or swallowing, vertigo/room spinning that does not quickly resolve, or weakness/numbness/loss of coordination affecting 1 side of the face or body she should proceed by ambulance to the nearest emergency room immediately.

## 2025-02-01 DIAGNOSIS — E78.5 HYPERLIPIDEMIA: ICD-10-CM

## 2025-02-01 DIAGNOSIS — I66.01 STENOSIS OF RIGHT MIDDLE CEREBRAL ARTERY: ICD-10-CM

## 2025-02-02 RX ORDER — ATORVASTATIN CALCIUM 20 MG/1
20 TABLET, FILM COATED ORAL DAILY
Qty: 30 TABLET | Refills: 0 | Status: SHIPPED | OUTPATIENT
Start: 2025-02-02

## 2025-03-24 ENCOUNTER — OFFICE VISIT (OUTPATIENT)
Dept: FAMILY MEDICINE CLINIC | Facility: CLINIC | Age: 65
End: 2025-03-24
Payer: COMMERCIAL

## 2025-03-24 VITALS
OXYGEN SATURATION: 99 % | HEART RATE: 69 BPM | RESPIRATION RATE: 16 BRPM | WEIGHT: 209.4 LBS | TEMPERATURE: 97.8 F | HEIGHT: 63 IN | SYSTOLIC BLOOD PRESSURE: 151 MMHG | BODY MASS INDEX: 37.1 KG/M2 | DIASTOLIC BLOOD PRESSURE: 67 MMHG

## 2025-03-24 DIAGNOSIS — E55.9 VITAMIN D DEFICIENCY: ICD-10-CM

## 2025-03-24 DIAGNOSIS — R73.9 HYPERGLYCEMIA: ICD-10-CM

## 2025-03-24 DIAGNOSIS — R91.8 PULMONARY NODULES: ICD-10-CM

## 2025-03-24 DIAGNOSIS — L40.50 PSORIASIS WITH ARTHROPATHY (HCC): ICD-10-CM

## 2025-03-24 DIAGNOSIS — D18.02 VENOUS ANGIOMA OF BRAIN (HCC): ICD-10-CM

## 2025-03-24 DIAGNOSIS — E53.8 VITAMIN B12 DEFICIENCY: ICD-10-CM

## 2025-03-24 DIAGNOSIS — E88.819 INSULIN RESISTANCE: ICD-10-CM

## 2025-03-24 DIAGNOSIS — I10 ESSENTIAL HYPERTENSION: Primary | ICD-10-CM

## 2025-03-24 DIAGNOSIS — Z13.0 SCREENING FOR DEFICIENCY ANEMIA: ICD-10-CM

## 2025-03-24 DIAGNOSIS — Z12.4 SCREENING FOR CERVICAL CANCER: ICD-10-CM

## 2025-03-24 DIAGNOSIS — E78.2 MIXED HYPERLIPIDEMIA: ICD-10-CM

## 2025-03-24 DIAGNOSIS — E03.9 ACQUIRED HYPOTHYROIDISM: ICD-10-CM

## 2025-03-24 DIAGNOSIS — Z12.31 ENCOUNTER FOR SCREENING MAMMOGRAM FOR BREAST CANCER: ICD-10-CM

## 2025-03-24 PROCEDURE — 99214 OFFICE O/P EST MOD 30 MIN: CPT | Performed by: NURSE PRACTITIONER

## 2025-03-24 NOTE — PATIENT INSTRUCTIONS
"Patient Education     Routine physical for adults   The Basics   Written by the doctors and editors at [facility]   What is a physical? -- A physical is a routine visit, or \"check-up,\" with your doctor. You might also hear it called a \"wellness visit\" or \"preventive visit.\"  During each visit, the doctor will:   Ask about your physical and mental health   Ask about your habits, behaviors, and lifestyle   Do an exam   Give you vaccines if needed   Talk to you about any medicines you take   Give advice about your health   Answer your questions  Getting regular check-ups is an important part of taking care of your health. It can help your doctor find and treat any problems you have. But it's also important for preventing health problems.  A routine physical is different from a \"sick visit.\" A sick visit is when you see a doctor because of a health concern or problem. Since physicals are scheduled ahead of time, you can think about what you want to ask the doctor.  How often should I get a physical? -- It depends on your age and health. In general, for people age 21 years and older:   If you are younger than 50 years, you might be able to get a physical every 3 years.   If you are 50 years or older, your doctor might recommend a physical every year.  If you have an ongoing health condition, like diabetes or high blood pressure, your doctor will probably want to see you more often.  What happens during a physical? -- In general, each visit will include:   Physical exam - The doctor or nurse will check your height, weight, heart rate, and blood pressure. They will also look at your eyes and ears. They will ask about how you are feeling and whether you have any symptoms that bother you.   Medicines - It's a good idea to bring a list of all the medicines you take to each doctor visit. Your doctor will talk to you about your medicines and answer any questions. Tell them if you are having any side effects that bother you. You " "should also tell them if you are having trouble paying for any of your medicines.   Habits and behaviors - This includes:   Your diet   Your exercise habits   Whether you smoke, drink alcohol, or use drugs   Whether you are sexually active   Whether you feel safe at home  Your doctor will talk to you about things you can do to improve your health and lower your risk of health problems. They will also offer help and support. For example, if you want to quit smoking, they can give you advice and might prescribe medicines. If you want to improve your diet or get more physical activity, they can help you with this, too.   Lab tests, if needed - The tests you get will depend on your age and situation. For example, your doctor might want to check your:   Cholesterol   Blood sugar   Iron level   Vaccines - The recommended vaccines will depend on your age, health, and what vaccines you already had. Vaccines are very important because they can prevent certain serious or deadly infections.   Discussion of screening - \"Screening\" means checking for diseases or other health problems before they cause symptoms. Your doctor can recommend screening based on your age, risk, and preferences. This might include tests to check for:   Cancer, such as breast, prostate, cervical, ovarian, colorectal, prostate, lung, or skin cancer   Sexually transmitted infections, such as chlamydia and gonorrhea   Mental health conditions like depression and anxiety  Your doctor will talk to you about the different types of screening tests. They can help you decide which screenings to have. They can also explain what the results might mean.   Answering questions - The physical is a good time to ask the doctor or nurse questions about your health. If needed, they can refer you to other doctors or specialists, too.  Adults older than 65 years often need other care, too. As you get older, your doctor will talk to you about:   How to prevent falling at " home   Hearing or vision tests   Memory testing   How to take your medicines safely   Making sure that you have the help and support you need at home  All topics are updated as new evidence becomes available and our peer review process is complete.  This topic retrieved from [employer] on: May 02, 2024.  Topic 918966 Version 1.0  Release: 32.4.3 - C32.122  © 2024 UpToDate, Inc. and/or its affiliates. All rights reserved.  Consumer Information Use and Disclaimer   Disclaimer: This generalized information is a limited summary of diagnosis, treatment, and/or medication information. It is not meant to be comprehensive and should be used as a tool to help the user understand and/or assess potential diagnostic and treatment options. It does NOT include all information about conditions, treatments, medications, side effects, or risks that may apply to a specific patient. It is not intended to be medical advice or a substitute for the medical advice, diagnosis, or treatment of a health care provider based on the health care provider's examination and assessment of a patient's specific and unique circumstances. Patients must speak with a health care provider for complete information about their health, medical questions, and treatment options, including any risks or benefits regarding use of medications. This information does not endorse any treatments or medications as safe, effective, or approved for treating a specific patient. UpToDate, Inc. and its affiliates disclaim any warranty or liability relating to this information or the use thereof.The use of this information is governed by the Terms of Use, available at https://www.[employer].com/en/know/clinical-effectiveness-terms. 2024© UpToDate, Inc. and its affiliates and/or licensors. All rights reserved.  Copyright   © 2024 UpToDate, Inc. and/or its affiliates. All rights reserved.

## 2025-03-24 NOTE — PROGRESS NOTES
Name: Giselle Cohn      : 1960      MRN: [id number]  Encounter Provider: ROBBI Lopez  Encounter Date: 3/24/2025   Encounter department: [facility]    Assessment & Plan  Essential hypertension         Pulmonary nodules         Insulin resistance    Orders:    Hemoglobin A1C; Future    Insulin, fasting; Future    Vitamin D deficiency    Orders:    Vitamin D 25 hydroxy; Future    Vitamin B12 deficiency    Orders:    Vitamin B12; Future    Mixed hyperlipidemia    Orders:    Lipid panel; Future    Screening for cervical cancer    Orders:    Ambulatory referral to Obstetrics / Gynecology; Future    Acquired hypothyroidism    Orders:    TSH, 3rd generation; Future    Encounter for screening mammogram for breast cancer    Orders:    Mammo screening bilateral w 3d and cad; Future    Hyperglycemia    Orders:    Comprehensive metabolic panel; Future    Hemoglobin A1C; Future    Insulin, fasting; Future    Screening for deficiency anemia    Orders:    CBC and differential; Future    Venous angioma of brain (HCC)         Psoriasis with arthropathy (HCC)           Depression Screening and Follow-up Plan: Patient was screened for depression during today's encounter. They screened negative with a PHQ-2 score of 0.          History of Present Illness     Patient presents to office for follow up and recheck. Complete medical history and medications reviewed with patient and tolerating all medications well without any problems. Vital signs reviewed and BP rechecked. Lab results reviewed with patient.  Denies any new problems or concerns at the present time. Continues to be followed [facility] for Hx Psoriatic Arthritis.  Patient states her Humira is not working for her generalized joint pain related to psoriatic arthritis so was switched to Cosentyx Injection which she is waiting for insurance approval.  Patient had appointment with Orthopedic Dr. Leung for her right knee DJD  who recommended patient get the Gel Injection for her right knee pain. Continues to be followed by Dr. [facility] for Venous Angioma of the Brain.   Denies any other problems or concerns at the present time.       Review of Systems  GENERAL:  Feels well, denies any significant changes in weight without trying.  SKIN:  Denies rashes, lesions, opened areas, wounds, change in moles or any other skin changes.  HEENT:  Denies any head injury or headaches.  Negative blurred vision, floaters, spots before eyes, infections, or other vision problems.  Negative significant changes in vision or hearing.  Negative tinnitus, vertigo, or infections.  Negative hay fever, sinus trouble, nasal discharge, bloody noses, or problems with smell.  Negative sore throat, bleeding gums, ulcers, or sores.   NECK:  Denies lumps, goiter, pain, swollen glands, or lymphadenopathy.  BREASTS:  Denies lumps, pain, nipple discharge, swelling, redness, or any other changes.  RESPIRATORY:  Denies cough, wheezing, shortness of breath, dyspnea, or orthopnea.  CARDIOVASCULAR:  Denies chest pain or palpitations.   GASTROINTESTINAL:  Appetite good, denies nausea, vomiting, or indigestion.  Bowel movements normal occurring about once daily or every other day.  URINARY:  Denies frequency, urgency, incontinence, dysuria, hematuria, nocturia, or recent flank pain.   GENITAL:  Denies vaginal discharge, pelvic infection, lesions, ulcers, or pain.   Negative dyspareunia or abnormal vaginal bleeding.  PERIPHERAL VASCULAR:  Denies varicosities, swelling, skin changes, or pain.  MUSCULOSKELETAL:  Denies back, joint, or muscle pain.    Negative problems with mobility or movement.   PSYCHIATRIC:  Denies problems with depression, anxiety, anger, or other psychiatric symptoms.  NEUROLOGIC:  Denies fainting, dizziness, memory problems, seizures, tingling, motor or sensory loss.   HEMATOLOGIC:  Denies easy bruising, bleeding, or anemia.  ENDOCRINE:  Denies thyroid  problems, temperature intolerance, excessive sweating, or other endocrine symptoms.    Past Medical History:   Diagnosis Date    Allergic     Asthma     Bilateral ovarian cysts     Cluster headache Unknown    Disease of thyroid gland     HYPOTHYROID    Eczema     Familial cavernous cerebral angioma (HCC)     Fibromyalgia     Fibromyalgia, primary Unknown    Headache, tension-type Unknown    Hypertension     Insomnia     MI (myocardial infarction) (HCC)     Migraines     Osteoarthritis     Polyarthropathy     Polymyalgia (HCC)     Psoriasis     TIA (transient ischemic attack)     Vitamin deficiency      Past Surgical History:   Procedure Laterality Date    DILATION AND CURETTAGE OF UTERUS      X 2    ENDOMETRIAL ABLATION W/ NOVASURE Bilateral 2010    REPAIR / RECONSTRUCTION COLLATERAL LIGAMENT HAND Left      Family History   Problem Relation Age of Onset    Breast cancer Mother 43    Hyperlipidemia Father     Anxiety disorder Sister     Rheum arthritis Sister     Asthma Sister     Depression Sister     Crohn's disease Sister     Lung cancer Sister     Sjogren's syndrome Daughter     Valvular heart disease Daughter     Cervical cancer Daughter     Arthritis Maternal Grandmother     Heart disease Maternal Grandmother     Stroke Maternal Grandmother     No Known Problems Maternal Grandfather     No Known Problems Paternal Grandmother     Valvular heart disease Paternal Grandfather     Stroke Maternal Aunt     No Known Problems Maternal Aunt     No Known Problems Maternal Aunt     No Known Problems Paternal Aunt     Breast cancer Paternal Aunt      Social History     Tobacco Use    Smoking status: Former     Current packs/day: 0.00     Average packs/day: 1.5 packs/day for 15.0 years (22.5 ttl pk-yrs)     Types: Cigarettes     Start date: 1978     Quit date: 1991     Years since quittin.2    Smokeless tobacco: Never   Vaping Use    Vaping status: Never Used   Substance and Sexual Activity    Alcohol use: Not  Currently     Comment: OCCASIOANLLY    Drug use: No    Sexual activity: Not Currently     Partners: Male     Birth control/protection: Other     Current Outpatient Medications on File Prior to Visit   Medication Sig    Adalimumab (Humira, 2 Pen,) 40 MG/0.4ML PNKT 40 mg every 28 days    albuterol (PROVENTIL HFA,VENTOLIN HFA) 90 mcg/act inhaler Inhale 2 puffs every 6 (six) hours as needed for wheezing or shortness of breath    Ascorbic Acid (VITAMIN C) 1000 MG tablet Take 1 tablet (1,000 mg total) by mouth daily    aspirin (ECOTRIN LOW STRENGTH) 81 mg EC tablet Take 81 mg by mouth daily    atorvastatin (LIPITOR) 20 mg tablet Take 1 tablet by mouth once daily    Calcium Carb-Cholecalciferol (Calcium 600 + D) 600-5 MG-MCG TABS Take 1 tablet by mouth 2 (two) times a day    cetirizine (EQ Allergy Relief, Cetirizine,) 10 mg tablet Take 1 tablet (10 mg total) by mouth daily    Cholecalciferol (Vitamin D3) 1.25 MG (84799 UT) CAPS Take 1 capsule (50,000 Units total) by mouth every 28 days    Cyanocobalamin (Vitamin B-12) 2500 MCG SUBL 1 tab po every 3rd day    famotidine (PEPCID) 40 MG tablet Take 1 tablet (40 mg total) by mouth daily at bedtime    fluticasone (FLONASE) 50 mcg/act nasal spray 1 spray each nostril 2 x daily x 1 month then prn congestion or allergies    ibuprofen (MOTRIN) 600 mg tablet Take 1 tablet (600 mg total) by mouth every 8 (eight) hours as needed for mild pain    levothyroxine 75 mcg tablet Take 1 tablet (75 mcg total) by mouth daily    losartan-hydrochlorothiazide (HYZAAR) 100-12.5 MG per tablet Take 1 tablet by mouth daily    metoprolol succinate (TOPROL-XL) 50 mg 24 hr tablet Take 1 tablet (50 mg total) by mouth daily    zinc gluconate 50 mg tablet Take 50 mg by mouth daily    [DISCONTINUED] Melatonin 5 MG CHEW Chew 5 mg daily at bedtime (Patient not taking: Reported on 3/24/2025)     Allergies   Allergen Reactions    Other Cough    Pollen Extract Cough and Sneezing     Immunization History  "  Administered Date(s) Administered    INFLUENZA 10/17/2018, 10/21/2019    Influenza, seasonal, injectable, preservative free 10/17/2018     Objective   /67 (BP Location: Left arm)   Pulse 69   Temp 97.8 °F (36.6 °C) (Tympanic)   Resp 16   Ht 5' 3\" (1.6 m)   Wt 95 kg (209 lb 6.4 oz)   SpO2 99%   BMI 37.09 kg/m²     Physical Exam  Vitals and nursing note reviewed.       GENERAL:  Appears well nourished, well groomed, in no acute distress.  SKIN:  Palms warm, dry, color good.  Nails without clubbing or cyanosis.    No lesions, ulcerations, or wounds.  HEAD:  Hair is average texture.  Scalp without lesions, normocephalic, and atraumatic.  EYES:  Visual fields full by confrontation.  Conjunctiva pink, sclera white, PERRLA.  Extraocular movements intact.   EARS:  B/L ear canals clear.  B/L TMs clear with + light reflex.    NOSE: Mucosa pink, moist, septum midline.  Negative sinus tenderness.   B/L turbinates pink, moist, non-edematous without exudate.   MOUTH:  Oral mucosa pink.  Pharynx pink, moist, without swelling, redness, or exudate.  Dentition ok.   Tongue midline.   NECK:  Supple, trachea midline, Negative thyromegaly, lymphadenopathy, or swollen glands.  LYMPH NODES:  Negative enlargement of neck, axillary, epitrochlear, or inguinal nodes.  THORAX/LUNGS  Thorax symmetric with good excursion.   Lungs resonant.  Breath sounds vesicular with no added sounds.    Diaphragm descends within normal limits.   CARDIOVASCULAR:  Carotid upstrokes brisk and without bruits.   Apical impulse discrete and tapping, barely palpable in the 5th ICS/MCL.    Normal S1 and Normal S2, Negative S3 or S4.    Negative murmurs, thrills, lifts, or heaves.  ABDOMEN:  Protuberant, bowel sounds normal active x 4 quadrants.    Negative tenderness.  Negative masses.  Negative hepatomegaly.  Negative splenomegaly.  Negative costovertebral tenderness.  EXTREMITIES:  Warm, calves supple, non-tender, negative for edema.    Negative " stasis pigmentation or ulcers.  +2 pulses throughout.  MUSCULOSKELETAL:  Negative joint deformities.    Good range of motion in hands, wrists, elbows, shoulders, spine, hips, knees, and ankles.  NEUROLOGICAL:  Mental status:  Awake, alert, and oriented to person, place, time, and event. Normal thought processes.       Administrative Statements   I have spent time in caring for this patient on the day of the visit/encounter including Risks and benefits of tx options, Instructions for management, Importance of tx compliance, Counseling / Coordination of care, and Obtaining or reviewing history  .

## 2025-04-03 ENCOUNTER — HOSPITAL ENCOUNTER (OUTPATIENT)
Dept: RADIOLOGY | Facility: CLINIC | Age: 65
End: 2025-04-03
Payer: COMMERCIAL

## 2025-04-03 VITALS — HEIGHT: 63 IN | WEIGHT: 204 LBS | BODY MASS INDEX: 36.14 KG/M2

## 2025-04-03 DIAGNOSIS — Z12.31 ENCOUNTER FOR SCREENING MAMMOGRAM FOR BREAST CANCER: ICD-10-CM

## 2025-04-03 PROCEDURE — 77067 SCR MAMMO BI INCL CAD: CPT

## 2025-04-03 PROCEDURE — 77063 BREAST TOMOSYNTHESIS BI: CPT

## 2025-04-05 DIAGNOSIS — E78.5 HYPERLIPIDEMIA: ICD-10-CM

## 2025-04-05 DIAGNOSIS — I66.01 STENOSIS OF RIGHT MIDDLE CEREBRAL ARTERY: ICD-10-CM

## 2025-04-07 ENCOUNTER — RESULTS FOLLOW-UP (OUTPATIENT)
Dept: FAMILY MEDICINE CLINIC | Facility: CLINIC | Age: 65
End: 2025-04-07

## 2025-04-07 RX ORDER — ATORVASTATIN CALCIUM 20 MG/1
20 TABLET, FILM COATED ORAL DAILY
Qty: 30 TABLET | Refills: 0 | Status: SHIPPED | OUTPATIENT
Start: 2025-04-07

## 2025-05-01 DIAGNOSIS — I66.01 STENOSIS OF RIGHT MIDDLE CEREBRAL ARTERY: ICD-10-CM

## 2025-05-01 DIAGNOSIS — E78.5 HYPERLIPIDEMIA: ICD-10-CM

## 2025-05-01 RX ORDER — ATORVASTATIN CALCIUM 20 MG/1
20 TABLET, FILM COATED ORAL DAILY
Qty: 30 TABLET | Refills: 5 | Status: SHIPPED | OUTPATIENT
Start: 2025-05-01

## 2025-05-11 DIAGNOSIS — J30.89 SEASONAL ALLERGIC RHINITIS DUE TO OTHER ALLERGIC TRIGGER: ICD-10-CM

## 2025-05-11 DIAGNOSIS — I10 ESSENTIAL HYPERTENSION: ICD-10-CM

## 2025-05-11 DIAGNOSIS — J01.00 ACUTE NON-RECURRENT MAXILLARY SINUSITIS: ICD-10-CM

## 2025-05-11 DIAGNOSIS — E03.9 ACQUIRED HYPOTHYROIDISM: ICD-10-CM

## 2025-05-11 RX ORDER — LEVOTHYROXINE SODIUM 75 UG/1
75 TABLET ORAL DAILY
Qty: 90 TABLET | Refills: 0 | Status: SHIPPED | OUTPATIENT
Start: 2025-05-11

## 2025-05-11 RX ORDER — LOSARTAN POTASSIUM AND HYDROCHLOROTHIAZIDE 12.5; 1 MG/1; MG/1
1 TABLET ORAL DAILY
Qty: 90 TABLET | Refills: 0 | Status: SHIPPED | OUTPATIENT
Start: 2025-05-11

## 2025-05-11 RX ORDER — METOPROLOL SUCCINATE 50 MG/1
50 TABLET, EXTENDED RELEASE ORAL DAILY
Qty: 90 TABLET | Refills: 0 | Status: SHIPPED | OUTPATIENT
Start: 2025-05-11

## 2025-05-11 RX ORDER — CETIRIZINE HYDROCHLORIDE 10 MG/1
10 TABLET, FILM COATED ORAL DAILY
Qty: 90 TABLET | Refills: 0 | Status: SHIPPED | OUTPATIENT
Start: 2025-05-11

## 2025-07-16 ENCOUNTER — TELEPHONE (OUTPATIENT)
Age: 65
End: 2025-07-16

## 2025-07-16 NOTE — TELEPHONE ENCOUNTER
LOV01/30/2025 lizabeth Ling     Per MA pt can be seen with any stroke provider.      Pt is scheduled with  on 02/03/2025 @ 200pm

## 2025-08-17 DIAGNOSIS — E03.9 ACQUIRED HYPOTHYROIDISM: ICD-10-CM

## 2025-08-17 DIAGNOSIS — I10 ESSENTIAL HYPERTENSION: ICD-10-CM

## 2025-08-19 RX ORDER — LEVOTHYROXINE SODIUM 75 UG/1
75 TABLET ORAL DAILY
Qty: 90 TABLET | Refills: 1 | Status: SHIPPED | OUTPATIENT
Start: 2025-08-19

## 2025-08-19 RX ORDER — LOSARTAN POTASSIUM AND HYDROCHLOROTHIAZIDE 12.5; 1 MG/1; MG/1
1 TABLET ORAL DAILY
Qty: 90 TABLET | Refills: 1 | Status: SHIPPED | OUTPATIENT
Start: 2025-08-19

## 2025-08-19 RX ORDER — METOPROLOL SUCCINATE 50 MG/1
50 TABLET, EXTENDED RELEASE ORAL DAILY
Qty: 90 TABLET | Refills: 1 | Status: SHIPPED | OUTPATIENT
Start: 2025-08-19

## 2025-08-22 ENCOUNTER — TELEPHONE (OUTPATIENT)
Dept: FAMILY MEDICINE CLINIC | Facility: CLINIC | Age: 65
End: 2025-08-22